# Patient Record
Sex: MALE | Race: ASIAN | NOT HISPANIC OR LATINO | Employment: UNEMPLOYED | URBAN - METROPOLITAN AREA
[De-identification: names, ages, dates, MRNs, and addresses within clinical notes are randomized per-mention and may not be internally consistent; named-entity substitution may affect disease eponyms.]

---

## 2017-07-06 ENCOUNTER — HOSPITAL ENCOUNTER (EMERGENCY)
Facility: HOSPITAL | Age: 57
Discharge: HOME | End: 2017-07-06
Attending: EMERGENCY MEDICINE
Payer: COMMERCIAL

## 2017-07-06 ENCOUNTER — APPOINTMENT (EMERGENCY)
Dept: CT IMAGING | Facility: HOSPITAL | Age: 57
End: 2017-07-06
Payer: COMMERCIAL

## 2017-07-06 VITALS
SYSTOLIC BLOOD PRESSURE: 151 MMHG | DIASTOLIC BLOOD PRESSURE: 75 MMHG | TEMPERATURE: 98.3 F | HEART RATE: 57 BPM | OXYGEN SATURATION: 98 % | RESPIRATION RATE: 16 BRPM | WEIGHT: 207 LBS

## 2017-07-06 DIAGNOSIS — M54.9 ACUTE BACK PAIN: Primary | ICD-10-CM

## 2017-07-06 DIAGNOSIS — M54.50 ACUTE LOW BACK PAIN: ICD-10-CM

## 2017-07-06 LAB
ALBUMIN SERPL BCP-MCNC: 3.6 G/DL (ref 3.5–5)
ALP SERPL-CCNC: 72 U/L (ref 46–116)
ALT SERPL W P-5'-P-CCNC: 35 U/L (ref 12–78)
ANION GAP SERPL CALCULATED.3IONS-SCNC: 8 MMOL/L (ref 4–13)
APTT PPP: 33 SECONDS (ref 23–35)
AST SERPL W P-5'-P-CCNC: 23 U/L (ref 5–45)
ATRIAL RATE: 74 BPM
BACTERIA UR QL AUTO: ABNORMAL /HPF
BASOPHILS # BLD AUTO: 0.03 THOUSANDS/ΜL (ref 0–0.1)
BASOPHILS NFR BLD AUTO: 0 % (ref 0–1)
BILIRUB SERPL-MCNC: 0.51 MG/DL (ref 0.2–1)
BILIRUB UR QL STRIP: NEGATIVE
BUN SERPL-MCNC: 9 MG/DL (ref 5–25)
CALCIUM SERPL-MCNC: 8.7 MG/DL (ref 8.3–10.1)
CHLORIDE SERPL-SCNC: 101 MMOL/L (ref 100–108)
CLARITY UR: CLEAR
CO2 SERPL-SCNC: 29 MMOL/L (ref 21–32)
COLOR UR: YELLOW
CREAT SERPL-MCNC: 0.89 MG/DL (ref 0.6–1.3)
EOSINOPHIL # BLD AUTO: 0.11 THOUSAND/ΜL (ref 0–0.61)
EOSINOPHIL NFR BLD AUTO: 1 % (ref 0–6)
ERYTHROCYTE [DISTWIDTH] IN BLOOD BY AUTOMATED COUNT: 12.5 % (ref 11.6–15.1)
GFR SERPL CREATININE-BSD FRML MDRD: >60 ML/MIN/1.73SQ M
GLUCOSE SERPL-MCNC: 164 MG/DL (ref 65–140)
GLUCOSE UR STRIP-MCNC: ABNORMAL MG/DL
HCT VFR BLD AUTO: 45 % (ref 36.5–49.3)
HGB BLD-MCNC: 15.2 G/DL (ref 12–17)
HGB UR QL STRIP.AUTO: ABNORMAL
INR PPP: 1.09 (ref 0.86–1.16)
KETONES UR STRIP-MCNC: NEGATIVE MG/DL
LEUKOCYTE ESTERASE UR QL STRIP: NEGATIVE
LIPASE SERPL-CCNC: 140 U/L (ref 73–393)
LYMPHOCYTES # BLD AUTO: 4.31 THOUSANDS/ΜL (ref 0.6–4.47)
LYMPHOCYTES NFR BLD AUTO: 42 % (ref 14–44)
MCH RBC QN AUTO: 29.1 PG (ref 26.8–34.3)
MCHC RBC AUTO-ENTMCNC: 33.8 G/DL (ref 31.4–37.4)
MCV RBC AUTO: 86 FL (ref 82–98)
MONOCYTES # BLD AUTO: 0.24 THOUSAND/ΜL (ref 0.17–1.22)
MONOCYTES NFR BLD AUTO: 2 % (ref 4–12)
NEUTROPHILS # BLD AUTO: 5.51 THOUSANDS/ΜL (ref 1.85–7.62)
NEUTS SEG NFR BLD AUTO: 55 % (ref 43–75)
NITRITE UR QL STRIP: NEGATIVE
NON-SQ EPI CELLS URNS QL MICRO: ABNORMAL /HPF
P AXIS: 65 DEGREES
PH UR STRIP.AUTO: 5.5 [PH] (ref 4.5–8)
PLATELET # BLD AUTO: 183 THOUSANDS/UL (ref 149–390)
PMV BLD AUTO: 10.6 FL (ref 8.9–12.7)
POTASSIUM SERPL-SCNC: 3.8 MMOL/L (ref 3.5–5.3)
PR INTERVAL: 144 MS
PROT SERPL-MCNC: 8.5 G/DL (ref 6.4–8.2)
PROT UR STRIP-MCNC: NEGATIVE MG/DL
PROTHROMBIN TIME: 14.1 SECONDS (ref 12.1–14.4)
QRS AXIS: 95 DEGREES
QRSD INTERVAL: 84 MS
QT INTERVAL: 384 MS
QTC INTERVAL: 426 MS
RBC # BLD AUTO: 5.22 MILLION/UL (ref 3.88–5.62)
RBC #/AREA URNS AUTO: ABNORMAL /HPF
SODIUM SERPL-SCNC: 138 MMOL/L (ref 136–145)
SP GR UR STRIP.AUTO: 1.02 (ref 1–1.03)
T WAVE AXIS: 1 DEGREES
UROBILINOGEN UR QL STRIP.AUTO: 0.2 E.U./DL
VENTRICULAR RATE: 74 BPM
WBC # BLD AUTO: 10.2 THOUSAND/UL (ref 4.31–10.16)
WBC #/AREA URNS AUTO: ABNORMAL /HPF

## 2017-07-06 PROCEDURE — 80053 COMPREHEN METABOLIC PANEL: CPT | Performed by: EMERGENCY MEDICINE

## 2017-07-06 PROCEDURE — 93005 ELECTROCARDIOGRAM TRACING: CPT | Performed by: EMERGENCY MEDICINE

## 2017-07-06 PROCEDURE — 85730 THROMBOPLASTIN TIME PARTIAL: CPT | Performed by: EMERGENCY MEDICINE

## 2017-07-06 PROCEDURE — 81002 URINALYSIS NONAUTO W/O SCOPE: CPT | Performed by: EMERGENCY MEDICINE

## 2017-07-06 PROCEDURE — 36415 COLL VENOUS BLD VENIPUNCTURE: CPT | Performed by: EMERGENCY MEDICINE

## 2017-07-06 PROCEDURE — 83690 ASSAY OF LIPASE: CPT | Performed by: EMERGENCY MEDICINE

## 2017-07-06 PROCEDURE — 96375 TX/PRO/DX INJ NEW DRUG ADDON: CPT

## 2017-07-06 PROCEDURE — 81001 URINALYSIS AUTO W/SCOPE: CPT

## 2017-07-06 PROCEDURE — 85025 COMPLETE CBC W/AUTO DIFF WBC: CPT | Performed by: EMERGENCY MEDICINE

## 2017-07-06 PROCEDURE — 85610 PROTHROMBIN TIME: CPT | Performed by: EMERGENCY MEDICINE

## 2017-07-06 PROCEDURE — 99284 EMERGENCY DEPT VISIT MOD MDM: CPT

## 2017-07-06 PROCEDURE — 74177 CT ABD & PELVIS W/CONTRAST: CPT

## 2017-07-06 PROCEDURE — 96374 THER/PROPH/DIAG INJ IV PUSH: CPT

## 2017-07-06 RX ORDER — LIDOCAINE 50 MG/G
1 PATCH TOPICAL EVERY 24 HOURS
Qty: 7 PATCH | Refills: 0 | Status: SHIPPED | OUTPATIENT
Start: 2017-07-06 | End: 2018-09-25 | Stop reason: ALTCHOICE

## 2017-07-06 RX ORDER — ONDANSETRON 2 MG/ML
4 INJECTION INTRAMUSCULAR; INTRAVENOUS ONCE
Status: COMPLETED | OUTPATIENT
Start: 2017-07-06 | End: 2017-07-06

## 2017-07-06 RX ORDER — MORPHINE SULFATE 4 MG/ML
4 INJECTION, SOLUTION INTRAMUSCULAR; INTRAVENOUS ONCE
Status: COMPLETED | OUTPATIENT
Start: 2017-07-06 | End: 2017-07-06

## 2017-07-06 RX ORDER — NAPROXEN 250 MG/1
250 TABLET ORAL
Qty: 21 TABLET | Refills: 0 | Status: SHIPPED | OUTPATIENT
Start: 2017-07-06 | End: 2018-09-25 | Stop reason: ALTCHOICE

## 2017-07-06 RX ADMIN — ONDANSETRON 4 MG: 2 INJECTION INTRAMUSCULAR; INTRAVENOUS at 14:52

## 2017-07-06 RX ADMIN — IOHEXOL 100 ML: 350 INJECTION, SOLUTION INTRAVENOUS at 16:42

## 2017-07-06 RX ADMIN — MORPHINE SULFATE 4 MG: 4 INJECTION, SOLUTION INTRAMUSCULAR; INTRAVENOUS at 14:52

## 2017-08-02 ENCOUNTER — ALLSCRIPTS OFFICE VISIT (OUTPATIENT)
Dept: OTHER | Facility: OTHER | Age: 57
End: 2017-08-02

## 2017-08-02 DIAGNOSIS — E11.65 TYPE 2 DIABETES MELLITUS WITH HYPERGLYCEMIA (HCC): ICD-10-CM

## 2017-08-02 LAB — HBA1C MFR BLD HPLC: 10.3 %

## 2017-08-03 ENCOUNTER — APPOINTMENT (OUTPATIENT)
Dept: LAB | Facility: CLINIC | Age: 57
End: 2017-08-03
Payer: COMMERCIAL

## 2017-08-03 ENCOUNTER — TRANSCRIBE ORDERS (OUTPATIENT)
Dept: LAB | Facility: CLINIC | Age: 57
End: 2017-08-03

## 2017-08-03 DIAGNOSIS — E11.65 TYPE 2 DIABETES MELLITUS WITH HYPERGLYCEMIA (HCC): ICD-10-CM

## 2017-08-03 LAB
25(OH)D3 SERPL-MCNC: 16.5 NG/ML (ref 30–100)
ALBUMIN SERPL BCP-MCNC: 3.3 G/DL (ref 3.5–5)
ALP SERPL-CCNC: 79 U/L (ref 46–116)
ALT SERPL W P-5'-P-CCNC: 27 U/L (ref 12–78)
ANION GAP SERPL CALCULATED.3IONS-SCNC: 11 MMOL/L (ref 4–13)
AST SERPL W P-5'-P-CCNC: 14 U/L (ref 5–45)
BASOPHILS # BLD AUTO: 0.05 THOUSANDS/ΜL (ref 0–0.1)
BASOPHILS NFR BLD AUTO: 1 % (ref 0–1)
BILIRUB SERPL-MCNC: 0.34 MG/DL (ref 0.2–1)
BUN SERPL-MCNC: 14 MG/DL (ref 5–25)
CALCIUM SERPL-MCNC: 8.7 MG/DL (ref 8.3–10.1)
CHLORIDE SERPL-SCNC: 99 MMOL/L (ref 100–108)
CHOLEST SERPL-MCNC: 179 MG/DL (ref 50–200)
CO2 SERPL-SCNC: 25 MMOL/L (ref 21–32)
CREAT SERPL-MCNC: 0.89 MG/DL (ref 0.6–1.3)
CREAT UR-MCNC: 137 MG/DL
EOSINOPHIL # BLD AUTO: 0.25 THOUSAND/ΜL (ref 0–0.61)
EOSINOPHIL NFR BLD AUTO: 3 % (ref 0–6)
ERYTHROCYTE [DISTWIDTH] IN BLOOD BY AUTOMATED COUNT: 12.3 % (ref 11.6–15.1)
GFR SERPL CREATININE-BSD FRML MDRD: 96 ML/MIN/1.73SQ M
GLUCOSE P FAST SERPL-MCNC: 218 MG/DL (ref 65–99)
HCT VFR BLD AUTO: 45.8 % (ref 36.5–49.3)
HDLC SERPL-MCNC: 29 MG/DL (ref 40–60)
HGB BLD-MCNC: 15.2 G/DL (ref 12–17)
LDLC SERPL CALC-MCNC: 86 MG/DL (ref 0–100)
LYMPHOCYTES # BLD AUTO: 4.85 THOUSANDS/ΜL (ref 0.6–4.47)
LYMPHOCYTES NFR BLD AUTO: 50 % (ref 14–44)
MCH RBC QN AUTO: 29.3 PG (ref 26.8–34.3)
MCHC RBC AUTO-ENTMCNC: 33.2 G/DL (ref 31.4–37.4)
MCV RBC AUTO: 88 FL (ref 82–98)
MICROALBUMIN UR-MCNC: 39.2 MG/L (ref 0–20)
MICROALBUMIN/CREAT 24H UR: 29 MG/G CREATININE (ref 0–30)
MONOCYTES # BLD AUTO: 0.36 THOUSAND/ΜL (ref 0.17–1.22)
MONOCYTES NFR BLD AUTO: 4 % (ref 4–12)
NEUTROPHILS # BLD AUTO: 3.95 THOUSANDS/ΜL (ref 1.85–7.62)
NEUTS SEG NFR BLD AUTO: 42 % (ref 43–75)
NRBC BLD AUTO-RTO: 0 /100 WBCS
PLATELET # BLD AUTO: 179 THOUSANDS/UL (ref 149–390)
PMV BLD AUTO: 11.3 FL (ref 8.9–12.7)
POTASSIUM SERPL-SCNC: 3.9 MMOL/L (ref 3.5–5.3)
PROT SERPL-MCNC: 8.2 G/DL (ref 6.4–8.2)
RBC # BLD AUTO: 5.18 MILLION/UL (ref 3.88–5.62)
SODIUM SERPL-SCNC: 135 MMOL/L (ref 136–145)
TRIGL SERPL-MCNC: 320 MG/DL
TSH SERPL DL<=0.05 MIU/L-ACNC: 0.93 UIU/ML (ref 0.36–3.74)
WBC # BLD AUTO: 9.48 THOUSAND/UL (ref 4.31–10.16)

## 2017-08-03 PROCEDURE — 36415 COLL VENOUS BLD VENIPUNCTURE: CPT

## 2017-08-03 PROCEDURE — 82043 UR ALBUMIN QUANTITATIVE: CPT

## 2017-08-03 PROCEDURE — 82570 ASSAY OF URINE CREATININE: CPT

## 2017-08-03 PROCEDURE — 84443 ASSAY THYROID STIM HORMONE: CPT

## 2017-08-03 PROCEDURE — 80053 COMPREHEN METABOLIC PANEL: CPT

## 2017-08-03 PROCEDURE — 85025 COMPLETE CBC W/AUTO DIFF WBC: CPT

## 2017-08-03 PROCEDURE — 80061 LIPID PANEL: CPT

## 2017-08-03 PROCEDURE — 82306 VITAMIN D 25 HYDROXY: CPT

## 2018-01-12 NOTE — MISCELLANEOUS
Message  Message Free Text Note Form: CALLED PT 5/27/16 @ 153PM STATED REGGIE HIS FRIEND "SAMEER" WILL CALL US BACK  SHE DID AND EXPLAINED TO ME THAT HE HAS NOT BEEN IN FOR A F/U DUE TO NOT HAVING INSURANCE, I ADVISED HER THAT WE WILL STILL SEE HIM IF HE CHOOSES TO COME IN AND THAT HE CAN ALSO SPEAK TO OUR FINANCIAL COUNSELOR TO GET INFORMATION ABOUT [facility]   PT HAS APPOINTMENT WITH DR Mendel Menjivar 6/6/16 @ 940AM       Signatures   Electronically signed by : DANA Perea ; May 27 2016  2:11PM EST

## 2018-01-13 VITALS
RESPIRATION RATE: 18 BRPM | TEMPERATURE: 96.6 F | DIASTOLIC BLOOD PRESSURE: 80 MMHG | WEIGHT: 203 LBS | BODY MASS INDEX: 32.62 KG/M2 | HEART RATE: 104 BPM | HEIGHT: 66 IN | SYSTOLIC BLOOD PRESSURE: 122 MMHG | OXYGEN SATURATION: 100 %

## 2018-02-01 DIAGNOSIS — E11.9 TYPE 2 DIABETES MELLITUS WITHOUT COMPLICATION, WITHOUT LONG-TERM CURRENT USE OF INSULIN (HCC): Primary | ICD-10-CM

## 2018-09-11 DIAGNOSIS — E11.9 TYPE 2 DIABETES MELLITUS WITHOUT COMPLICATION, WITHOUT LONG-TERM CURRENT USE OF INSULIN (HCC): ICD-10-CM

## 2018-09-25 ENCOUNTER — OFFICE VISIT (OUTPATIENT)
Dept: URGENT CARE | Age: 58
End: 2018-09-25
Payer: COMMERCIAL

## 2018-09-25 DIAGNOSIS — R21 RASH: Primary | ICD-10-CM

## 2018-09-25 PROCEDURE — 99213 OFFICE O/P EST LOW 20 MIN: CPT | Performed by: PHYSICIAN ASSISTANT

## 2018-09-25 RX ORDER — CLOTRIMAZOLE 1 %
CREAM (GRAM) TOPICAL 2 TIMES DAILY
Qty: 30 G | Refills: 0 | Status: SHIPPED | OUTPATIENT
Start: 2018-09-25 | End: 2020-09-02 | Stop reason: ALTCHOICE

## 2018-09-25 NOTE — PROGRESS NOTES
[address] [facility]        NAME: Gene Cid is a 62 y o  male  : 1960    MRN: [id number]  DATE: 2018  TIME: 12:17 PM    Assessment and Plan   Rash [R21]  1  Rash  hydrocortisone 2 5 % cream    clotrimazole (LOTRIMIN) 1 % cream         Patient Instructions     Use creams as directed  Benadryl as needed for additional itching relief  Applied Triglide to areas that are rubbing if you're going to be out and be active  Follow up with PCP in 3-5 days  Proceed to  ER if symptoms worsen  Chief Complaint     Chief Complaint   Patient presents with    Rash     Pt c/o rash on arms, legs, and face for 1 week  History of Present Illness       69-year-old male presents with rash to bilateral arms in the axillary area  Bilateral inguinal area and posterior lower legs for the past couple weeks  No fevers chills nausea vomiting diarrhea  Rash   This is a new problem  The current episode started 1 to 4 weeks ago  The problem has been waxing and waning since onset  The affected locations include the left axilla, right axilla, right arm, left arm, right lower leg and left lower leg (Left and right inguinal area)  The rash is characterized by itchiness, pain, redness, swelling and burning  He was exposed to nothing  Pertinent negatives include no cough, fever or shortness of breath  Past treatments include nothing  The treatment provided no relief  Review of Systems   Review of Systems   Constitutional: Negative  Negative for fever  HENT: Negative  Eyes: Negative  Respiratory: Negative  Negative for cough and shortness of breath  Cardiovascular: Negative  Gastrointestinal: Negative  Musculoskeletal: Negative  Skin: Positive for rash  Neurological: Negative            Current Medications       Current Outpatient Prescriptions:     metFORMIN (GLUCOPHAGE) 1000 MG tablet, TAKE ONE TABLET BY MOUTH TWICE A DAY, Disp: 180 tablet, Rfl: 1    clotrimazole (LOTRIMIN) 1 % cream, Apply topically 2 (two) times a day, Disp: 30 g, Rfl: 0    hydrocortisone 2 5 % cream, Apply topically 2 (two) times a day, Disp: 30 g, Rfl: 0    Current Allergies     Allergies as of 09/25/2018    (No Known Allergies)            The following portions of the patient's history were reviewed and updated as appropriate: allergies, current medications, past family history, past medical history, past social history, past surgical history and problem list      Past Medical History:   Diagnosis Date    Diabetes mellitus ([facility] [address] )        History reviewed  No pertinent surgical history  History reviewed  No pertinent family history  Medications have been verified  Objective   There were no vitals taken for this visit  Physical Exam     Physical Exam   Constitutional: He is oriented to person, place, and time  He appears well-developed and well-nourished  No distress  HENT:   Head: Normocephalic and atraumatic  Right Ear: External ear normal    Left Ear: External ear normal    Nose: Nose normal    Mouth/Throat: Oropharynx is clear and moist    Eyes: Conjunctivae are normal  Right eye exhibits no discharge  Left eye exhibits no discharge  Neck: Normal range of motion  Neck supple  Cardiovascular: Normal rate, regular rhythm and intact distal pulses  Pulmonary/Chest: Effort normal  No respiratory distress  Musculoskeletal: Normal range of motion  Neurological: He is alert and oriented to person, place, and time  Skin: Skin is warm and dry  Rash noted  Rash is maculopapular  Psychiatric: He has a normal mood and affect  Nursing note and vitals reviewed

## 2018-09-25 NOTE — PATIENT INSTRUCTIONS
Use creams as directed  Benadryl as needed for additional itching relief  Applied Triglide to areas that are rubbing if you're going to be out and be active  Follow up with PCP in 3-5 days  Proceed to  ER if symptoms worsen  Acute Rash   AMBULATORY CARE:   A rash  is irritation, redness, or itchiness in the skin or mucus membranes  Mucus membranes are areas such as the lining of your nose or throat  Acute means the rash starts suddenly, worsens quickly, and lasts a short time  An acute rash may be caused by a disease, such as hepatitis or vasculitis  The rash may be a reaction to something you are allergic to, such as certain foods, or latex  Certain medicines, including antibiotics, NSAIDs, prescription pain medicines, and aspirin can also cause a rash  Seek care immediately if:   · You have sudden trouble breathing or chest pain  · You are vomiting, have a headache or muscle aches, and your throat hurts  Contact your healthcare provider if:   · You have a fever  · You get open wounds from scratching your skin, or you have a wound that is red, swollen, or painful  · Your rash lasts longer than 3 months  · You have swelling or pain in your joints  · You have questions or concerns about your condition or care  Medicines:  If your rash does not go away on its own, you may need the following:  · Antihistamines  may be given to help decrease itching  · Steroids  may be given to decrease inflammation  · Antibiotics  help fight or prevent a bacterial infection  · Take your medicine as directed  Contact your healthcare provider if you think your medicine is not helping or if you have side effects  Tell him of her if you are allergic to any medicine  Keep a list of the medicines, vitamins, and herbs you take  Include the amounts, and when and why you take them  Bring the list or the pill bottles to follow-up visits  Carry your medicine list with you in case of an emergency    Prevent a rash or care for your skin when you have a rash:  Dry skin can lead to more problems  Do not scratch your skin if it itches  You may cause a skin infection by scratching  The following may prevent dry skin, and help your skin look better:  · Use thick cream lotions or petroleum jelly to help soothe your rash  These products work well on areas with thick skin, such as your feet  Cool compresses may also be used to soothe your skin  Apply a cool compress or a cool, wet towel, and then cover it with a dry towel  · Use lukewarm water when you bathe  Hot water may damage your skin more  Pat your skin dry  Do not rub your skin with a towel  · Use detergents, soaps, shampoos, and bubble baths made for sensitive skin  Wear clothes that are made of cotton instead of nylon or wool  Cotton is softer, so it will not hurt your skin as much  Follow up with your healthcare provider as directed: You may need to see a dermatologist if healthcare providers do not know what is causing your rash  You may also need to see a dermatologist if your rash does not get better even with treatment  You may need to see a dietitian if you have allergies to foods  Write down your questions so you remember to ask them during your visits  © 2017 [facility] Information is for End User's use only and may not be sold, redistributed or otherwise used for commercial purposes  All illustrations and images included in CareNotes® are the copyrighted property of A D A M [employer] Inc  or [employer]  The above information is an  only  It is not intended as medical advice for individual conditions or treatments  Talk to your doctor, nurse or pharmacist before following any medical regimen to see if it is safe and effective for you

## 2018-10-22 ENCOUNTER — TRANSCRIBE ORDERS (OUTPATIENT)
Dept: LAB | Facility: CLINIC | Age: 58
End: 2018-10-22

## 2018-10-22 ENCOUNTER — APPOINTMENT (OUTPATIENT)
Dept: LAB | Facility: CLINIC | Age: 58
End: 2018-10-22
Payer: COMMERCIAL

## 2018-10-22 ENCOUNTER — OFFICE VISIT (OUTPATIENT)
Dept: FAMILY MEDICINE CLINIC | Facility: CLINIC | Age: 58
End: 2018-10-22
Payer: COMMERCIAL

## 2018-10-22 ENCOUNTER — CLINICAL SUPPORT (OUTPATIENT)
Dept: FAMILY MEDICINE CLINIC | Facility: CLINIC | Age: 58
End: 2018-10-22
Payer: COMMERCIAL

## 2018-10-22 VITALS
BODY MASS INDEX: 33.29 KG/M2 | SYSTOLIC BLOOD PRESSURE: 114 MMHG | HEART RATE: 94 BPM | OXYGEN SATURATION: 99 % | HEIGHT: 67 IN | TEMPERATURE: 97.6 F | RESPIRATION RATE: 18 BRPM | DIASTOLIC BLOOD PRESSURE: 70 MMHG | WEIGHT: 212.13 LBS

## 2018-10-22 DIAGNOSIS — E11.9 TYPE 2 DIABETES MELLITUS WITHOUT COMPLICATION, WITHOUT LONG-TERM CURRENT USE OF INSULIN (HCC): Primary | ICD-10-CM

## 2018-10-22 DIAGNOSIS — Z01.00 DIABETIC EYE EXAM (HCC): Primary | ICD-10-CM

## 2018-10-22 DIAGNOSIS — Z23 NEED FOR INFLUENZA VACCINATION: ICD-10-CM

## 2018-10-22 DIAGNOSIS — E11.9 DIABETIC EYE EXAM (HCC): Primary | ICD-10-CM

## 2018-10-22 DIAGNOSIS — Z12.11 COLON CANCER SCREENING: ICD-10-CM

## 2018-10-22 DIAGNOSIS — L91.8 SKIN TAGS, MULTIPLE ACQUIRED: ICD-10-CM

## 2018-10-22 DIAGNOSIS — R22.9 MASS OF SKIN: ICD-10-CM

## 2018-10-22 LAB
ALBUMIN SERPL BCP-MCNC: 3.8 G/DL (ref 3.5–5)
ALP SERPL-CCNC: 61 U/L (ref 46–116)
ALT SERPL W P-5'-P-CCNC: 31 U/L (ref 12–78)
ANION GAP SERPL CALCULATED.3IONS-SCNC: 6 MMOL/L (ref 4–13)
AST SERPL W P-5'-P-CCNC: 19 U/L (ref 5–45)
BASOPHILS # BLD AUTO: 0.06 THOUSANDS/ΜL (ref 0–0.1)
BASOPHILS NFR BLD AUTO: 1 % (ref 0–1)
BILIRUB SERPL-MCNC: 0.6 MG/DL (ref 0.2–1)
BUN SERPL-MCNC: 13 MG/DL (ref 5–25)
CALCIUM SERPL-MCNC: 8.8 MG/DL (ref 8.3–10.1)
CHLORIDE SERPL-SCNC: 103 MMOL/L (ref 100–108)
CHOLEST SERPL-MCNC: 182 MG/DL (ref 50–200)
CO2 SERPL-SCNC: 27 MMOL/L (ref 21–32)
CREAT SERPL-MCNC: 0.99 MG/DL (ref 0.6–1.3)
CREAT UR-MCNC: 163 MG/DL
EOSINOPHIL # BLD AUTO: 0.19 THOUSAND/ΜL (ref 0–0.61)
EOSINOPHIL NFR BLD AUTO: 2 % (ref 0–6)
ERYTHROCYTE [DISTWIDTH] IN BLOOD BY AUTOMATED COUNT: 12.9 % (ref 11.6–15.1)
GFR SERPL CREATININE-BSD FRML MDRD: 84 ML/MIN/1.73SQ M
GLUCOSE P FAST SERPL-MCNC: 137 MG/DL (ref 65–99)
HCT VFR BLD AUTO: 45.4 % (ref 36.5–49.3)
HDLC SERPL-MCNC: 31 MG/DL (ref 40–60)
HGB BLD-MCNC: 14.7 G/DL (ref 12–17)
IMM GRANULOCYTES # BLD AUTO: 0.03 THOUSAND/UL (ref 0–0.2)
IMM GRANULOCYTES NFR BLD AUTO: 0 % (ref 0–2)
LDLC SERPL CALC-MCNC: 92 MG/DL (ref 0–100)
LEFT EYE DIABETIC RETINOPATHY: NORMAL
LEFT EYE IMAGE QUALITY: NORMAL
LYMPHOCYTES # BLD AUTO: 4.68 THOUSANDS/ΜL (ref 0.6–4.47)
LYMPHOCYTES NFR BLD AUTO: 50 % (ref 14–44)
MCH RBC QN AUTO: 29.6 PG (ref 26.8–34.3)
MCHC RBC AUTO-ENTMCNC: 32.4 G/DL (ref 31.4–37.4)
MCV RBC AUTO: 92 FL (ref 82–98)
MICROALBUMIN UR-MCNC: 68.8 MG/L (ref 0–20)
MICROALBUMIN/CREAT 24H UR: 42 MG/G CREATININE (ref 0–30)
MONOCYTES # BLD AUTO: 0.25 THOUSAND/ΜL (ref 0.17–1.22)
MONOCYTES NFR BLD AUTO: 3 % (ref 4–12)
NEUTROPHILS # BLD AUTO: 4.15 THOUSANDS/ΜL (ref 1.85–7.62)
NEUTS SEG NFR BLD AUTO: 44 % (ref 43–75)
NONHDLC SERPL-MCNC: 151 MG/DL
NRBC BLD AUTO-RTO: 0 /100 WBCS
PLATELET # BLD AUTO: 222 THOUSANDS/UL (ref 149–390)
PMV BLD AUTO: 11 FL (ref 8.9–12.7)
POTASSIUM SERPL-SCNC: 4.5 MMOL/L (ref 3.5–5.3)
PROT SERPL-MCNC: 8.8 G/DL (ref 6.4–8.2)
RBC # BLD AUTO: 4.96 MILLION/UL (ref 3.88–5.62)
RIGHT EYE DIABETIC RETINOPATHY: NORMAL
RIGHT EYE IMAGE QUALITY: NORMAL
SEVERITY (EYE EXAM): NORMAL
SL AMB POCT HEMOGLOBIN AIC: 7.8
SODIUM SERPL-SCNC: 136 MMOL/L (ref 136–145)
TRIGL SERPL-MCNC: 296 MG/DL
TSH SERPL DL<=0.05 MIU/L-ACNC: 1.08 UIU/ML (ref 0.36–3.74)
WBC # BLD AUTO: 9.36 THOUSAND/UL (ref 4.31–10.16)

## 2018-10-22 PROCEDURE — 90471 IMMUNIZATION ADMIN: CPT

## 2018-10-22 PROCEDURE — 84443 ASSAY THYROID STIM HORMONE: CPT | Performed by: FAMILY MEDICINE

## 2018-10-22 PROCEDURE — 83036 HEMOGLOBIN GLYCOSYLATED A1C: CPT | Performed by: FAMILY MEDICINE

## 2018-10-22 PROCEDURE — 1036F TOBACCO NON-USER: CPT | Performed by: FAMILY MEDICINE

## 2018-10-22 PROCEDURE — 3008F BODY MASS INDEX DOCD: CPT | Performed by: FAMILY MEDICINE

## 2018-10-22 PROCEDURE — 3045F PR MOST RECENT HEMOGLOBIN A1C LEVEL 7.0-9.0%: CPT | Performed by: FAMILY MEDICINE

## 2018-10-22 PROCEDURE — 82570 ASSAY OF URINE CREATININE: CPT | Performed by: FAMILY MEDICINE

## 2018-10-22 PROCEDURE — 90682 RIV4 VACC RECOMBINANT DNA IM: CPT

## 2018-10-22 PROCEDURE — 3072F LOW RISK FOR RETINOPATHY: CPT | Performed by: FAMILY MEDICINE

## 2018-10-22 PROCEDURE — 99213 OFFICE O/P EST LOW 20 MIN: CPT | Performed by: FAMILY MEDICINE

## 2018-10-22 PROCEDURE — 80061 LIPID PANEL: CPT | Performed by: FAMILY MEDICINE

## 2018-10-22 PROCEDURE — 80053 COMPREHEN METABOLIC PANEL: CPT | Performed by: FAMILY MEDICINE

## 2018-10-22 PROCEDURE — 36415 COLL VENOUS BLD VENIPUNCTURE: CPT | Performed by: FAMILY MEDICINE

## 2018-10-22 PROCEDURE — 85025 COMPLETE CBC W/AUTO DIFF WBC: CPT | Performed by: FAMILY MEDICINE

## 2018-10-22 PROCEDURE — 82043 UR ALBUMIN QUANTITATIVE: CPT | Performed by: FAMILY MEDICINE

## 2018-10-22 PROCEDURE — 3725F SCREEN DEPRESSION PERFORMED: CPT

## 2018-10-22 PROCEDURE — 3060F POS MICROALBUMINURIA REV: CPT | Performed by: FAMILY MEDICINE

## 2018-10-22 PROCEDURE — 92250 FUNDUS PHOTOGRAPHY W/I&R: CPT

## 2018-10-22 NOTE — ASSESSMENT & PLAN NOTE
Given numerous skin tags on neck, groin and underarms which are uncomfortable for him, referred to dermatology

## 2018-10-22 NOTE — ASSESSMENT & PLAN NOTE
Lab Results   Component Value Date    HGBA1C 7 8 10/22/2018       No results for input(s): POCGLU in the last 72 hours      Blood Sugar Average: Last 72 hrs:  BW improved compared to last visit  Continue Metformin  Dicussed low carbohydrate diet at length

## 2018-10-22 NOTE — PROGRESS NOTES
Diabetic Foot Exam    Patient's shoes and socks removed  Right Foot/Ankle   Right Foot Inspection  Skin Exam: skin normal and skin intact no dry skin, no warmth, no callus, no erythema, no maceration, no abnormal color, no pre-ulcer, no ulcer and no callus                          Toe Exam: ROM and strength within normal limits  Sensory   Vibration: intact  Proprioception: intact   Monofilament testing: intact  Vascular  Capillary refills: < 3 seconds  The right DP pulse is 1+  The right PT pulse is 1+  Left Foot/Ankle  Left Foot Inspection  Skin Exam: skin normal and skin intactno dry skin, no warmth, no erythema, no maceration, normal color, no pre-ulcer, no ulcer and no callus                         Toe Exam: ROM and strength within normal limits                   Sensory   Vibration: intact  Proprioception: intact  Monofilament: intact  Vascular  Capillary refills: < 3 seconds  The left DP pulse is 1+  The left PT pulse is 1+  Assign Risk Category:  No deformity present; No loss of protective sensation; No weak pulses       Risk: 0  Assessment/Plan:    Mass of skin  Given discoloration and increasing size of plaque on face would like him to see dermatology     Skin tags, multiple acquired  Given numerous skin tags on neck, groin and underarms which are uncomfortable for him, referred to dermatology     Type 2 diabetes mellitus without complication, without long-term current use of insulin ([facility] [address] )  Lab Results   Component Value Date    HGBA1C 7 8 10/22/2018       No results for input(s): POCGLU in the last 72 hours      Blood Sugar Average: Last 72 hrs:  BW improved compared to last visit  Continue Metformin  Dicussed low carbohydrate diet at length          Problem List Items Addressed This Visit        Endocrine    Type 2 diabetes mellitus without complication, without long-term current use of insulin ([address] ) - Primary     Lab Results   Component Value Date    HGBA1C 7 8 10/22/2018       No results for input(s): POCGLU in the last 72 hours  Blood Sugar Average: Last 72 hrs:  BW improved compared to last visit  Continue Metformin  Dicussed low carbohydrate diet at length          Relevant Medications    metFORMIN (GLUCOPHAGE) 1000 MG tablet    Other Relevant Orders    POCT hemoglobin A1c (Completed)    CBC and differential    Comprehensive metabolic panel    Lipid panel    Microalbumin / creatinine urine ratio    TSH, 3rd generation with Free T4 reflex       Musculoskeletal and Integument    Skin tags, multiple acquired     Given numerous skin tags on neck, groin and underarms which are uncomfortable for him, referred to dermatology          Relevant Orders    Ambulatory referral to Plastic Surgery       Other    Mass of skin     Given discoloration and increasing size of plaque on face would like him to see dermatology          Relevant Orders    Ambulatory referral to Plastic Surgery      Other Visit Diagnoses     Colon cancer screening        Relevant Orders    Ambulatory referral to Gastroenterology    Need for influenza vaccination        Relevant Orders    influenza vaccine, [id number], quadrivalent, recombinant, PF, 0 5 mL, for patients 18 yr+ (FLUBLOK) (Completed)            Subjective:      Patient ID: Brandon Davenport is a 62 y o  male  61 yo [facility] gentleman with DM, does not check his BG regularly, states has been watching his diet and going to the gym 3 to 4 days a week  Complains of plaque on his face which has been growing and getting darker  Also complains of increasing number of skin tags on his neck and groins  Has R elbow pain, worse with using his elbow         The following portions of the patient's history were reviewed and updated as appropriate:   He  has a past medical history of Diabetes mellitus ([address] )    He   Patient Active Problem List    Diagnosis Date Noted    Mass of skin 10/22/2018    Skin tags, multiple acquired 10/22/2018    Type 2 diabetes mellitus without complication, without long-term current use of insulin ([facility] [address] ) 10/22/2018     He  has no past surgical history on file  His family history is not on file  He  reports that he has quit smoking  He has never used smokeless tobacco  He reports that he does not drink alcohol or use drugs  Current Outpatient Prescriptions   Medication Sig Dispense Refill    clotrimazole (LOTRIMIN) 1 % cream Apply topically 2 (two) times a day 30 g 0    hydrocortisone 2 5 % cream Apply topically 2 (two) times a day 30 g 0    metFORMIN (GLUCOPHAGE) 1000 MG tablet Take 1 tablet (1,000 mg total) by mouth 2 (two) times a day 180 tablet 1     No current facility-administered medications for this visit  Current Outpatient Prescriptions on File Prior to Visit   Medication Sig    clotrimazole (LOTRIMIN) 1 % cream Apply topically 2 (two) times a day    hydrocortisone 2 5 % cream Apply topically 2 (two) times a day    [DISCONTINUED] metFORMIN (GLUCOPHAGE) 1000 MG tablet TAKE ONE TABLET BY MOUTH TWICE A DAY     No current facility-administered medications on file prior to visit       Review of Systems   Musculoskeletal: Positive for arthralgias  R elbow pain    Skin:        As per HPI   All other systems reviewed and are negative  Objective:      /70 (BP Location: Right arm, Patient Position: Sitting, Cuff Size: Large)   Pulse 94   Temp 97 6 °F (36 4 °C) (Tympanic)   Resp 18   Ht 5' 7 2" (1 707 m)   Wt 96 2 kg (212 lb 2 oz)   SpO2 99%   BMI 33 03 kg/m²          Physical Exam   Constitutional: He is oriented to person, place, and time  He appears well-developed  HENT:   Head: Normocephalic  Right Ear: External ear normal    Left Ear: External ear normal    Nose: Nose normal    Mouth/Throat: Oropharynx is clear and moist    Eyes: Pupils are equal, round, and reactive to light  Conjunctivae and EOM are normal    Neck: Normal range of motion  Neck supple  No thyromegaly present     Cardiovascular: Normal rate, regular rhythm and normal heart sounds  Pulses are no weak pulses  Pulses:       Dorsalis pedis pulses are 1+ on the right side, and 1+ on the left side  Posterior tibial pulses are 1+ on the right side, and 1+ on the left side  Pulmonary/Chest: Effort normal and breath sounds normal    Abdominal: Soft  There is no tenderness  There is no rebound and no guarding  Musculoskeletal: Normal range of motion  Feet:   Right Foot:   Skin Integrity: Negative for ulcer, skin breakdown, erythema, warmth, callus or dry skin  Left Foot:   Skin Integrity: Negative for ulcer, skin breakdown, erythema, warmth, callus or dry skin  Neurological: He is alert and oriented to person, place, and time  He has normal reflexes  Skin: Skin is dry  1 cm irregular plaque dark with hypochromic areas, irregular borders   Multiple skin tags of varying sizes on neck and groins  Over 20 in number    Psychiatric: He has a normal mood and affect  Nursing note and vitals reviewed

## 2018-10-23 DIAGNOSIS — E11.319 DIABETIC RETINOPATHY OF BOTH EYES WITHOUT MACULAR EDEMA ASSOCIATED WITH TYPE 2 DIABETES MELLITUS, UNSPECIFIED RETINOPATHY SEVERITY (HCC): Primary | ICD-10-CM

## 2018-10-23 NOTE — PROGRESS NOTES
Pls let pt know his eye exam shows that the diabetes is affecting his eyes and needs to see an eye doctor within the next 6 months

## 2018-11-12 DIAGNOSIS — E11.9 TYPE 2 DIABETES MELLITUS WITHOUT COMPLICATION, WITHOUT LONG-TERM CURRENT USE OF INSULIN (HCC): Primary | ICD-10-CM

## 2018-11-12 PROCEDURE — 4010F ACE/ARB THERAPY RXD/TAKEN: CPT | Performed by: FAMILY MEDICINE

## 2018-11-12 RX ORDER — LISINOPRIL 5 MG/1
TABLET ORAL
Qty: 90 TABLET | Refills: 5 | Status: SHIPPED | OUTPATIENT
Start: 2018-11-12 | End: 2020-09-02 | Stop reason: SDUPTHER

## 2019-08-15 DIAGNOSIS — E11.9 TYPE 2 DIABETES MELLITUS WITHOUT COMPLICATION, WITHOUT LONG-TERM CURRENT USE OF INSULIN (HCC): ICD-10-CM

## 2019-10-08 ENCOUNTER — OFFICE VISIT (OUTPATIENT)
Dept: FAMILY MEDICINE CLINIC | Facility: CLINIC | Age: 59
End: 2019-10-08

## 2019-10-08 ENCOUNTER — APPOINTMENT (OUTPATIENT)
Dept: LAB | Facility: CLINIC | Age: 59
End: 2019-10-08
Payer: COMMERCIAL

## 2019-10-08 VITALS
BODY MASS INDEX: 32.07 KG/M2 | WEIGHT: 206 LBS | OXYGEN SATURATION: 98 % | SYSTOLIC BLOOD PRESSURE: 110 MMHG | TEMPERATURE: 97.7 F | RESPIRATION RATE: 18 BRPM | DIASTOLIC BLOOD PRESSURE: 76 MMHG | HEART RATE: 101 BPM

## 2019-10-08 DIAGNOSIS — E11.9 TYPE 2 DIABETES MELLITUS WITHOUT COMPLICATION, WITHOUT LONG-TERM CURRENT USE OF INSULIN (HCC): Primary | ICD-10-CM

## 2019-10-08 DIAGNOSIS — E11.9 TYPE 2 DIABETES MELLITUS WITHOUT COMPLICATION, WITHOUT LONG-TERM CURRENT USE OF INSULIN (HCC): ICD-10-CM

## 2019-10-08 DIAGNOSIS — L03.211 CELLULITIS OF FACE: ICD-10-CM

## 2019-10-08 DIAGNOSIS — Z23 NEED FOR VACCINATION: ICD-10-CM

## 2019-10-08 DIAGNOSIS — M54.50 ACUTE LEFT-SIDED LOW BACK PAIN WITHOUT SCIATICA: ICD-10-CM

## 2019-10-08 LAB
ALBUMIN SERPL BCP-MCNC: 4.3 G/DL (ref 3.5–5)
ALP SERPL-CCNC: 66 U/L (ref 46–116)
ALT SERPL W P-5'-P-CCNC: 46 U/L (ref 12–78)
ANION GAP SERPL CALCULATED.3IONS-SCNC: 9 MMOL/L (ref 4–13)
AST SERPL W P-5'-P-CCNC: 23 U/L (ref 5–45)
BASOPHILS # BLD AUTO: 0.08 THOUSANDS/ΜL (ref 0–0.1)
BASOPHILS NFR BLD AUTO: 1 % (ref 0–1)
BILIRUB SERPL-MCNC: 0.67 MG/DL (ref 0.2–1)
BUN SERPL-MCNC: 18 MG/DL (ref 5–25)
CALCIUM SERPL-MCNC: 9.2 MG/DL (ref 8.3–10.1)
CHLORIDE SERPL-SCNC: 104 MMOL/L (ref 100–108)
CHOLEST SERPL-MCNC: 178 MG/DL (ref 50–200)
CO2 SERPL-SCNC: 24 MMOL/L (ref 21–32)
CREAT SERPL-MCNC: 1.03 MG/DL (ref 0.6–1.3)
CREAT UR-MCNC: 169 MG/DL
EOSINOPHIL # BLD AUTO: 0.21 THOUSAND/ΜL (ref 0–0.61)
EOSINOPHIL NFR BLD AUTO: 2 % (ref 0–6)
ERYTHROCYTE [DISTWIDTH] IN BLOOD BY AUTOMATED COUNT: 12.4 % (ref 11.6–15.1)
GFR SERPL CREATININE-BSD FRML MDRD: 79 ML/MIN/1.73SQ M
GLUCOSE P FAST SERPL-MCNC: 220 MG/DL (ref 65–99)
HCT VFR BLD AUTO: 48.3 % (ref 36.5–49.3)
HDLC SERPL-MCNC: 31 MG/DL (ref 40–60)
HGB BLD-MCNC: 15.6 G/DL (ref 12–17)
IMM GRANULOCYTES # BLD AUTO: 0.03 THOUSAND/UL (ref 0–0.2)
IMM GRANULOCYTES NFR BLD AUTO: 0 % (ref 0–2)
LDLC SERPL CALC-MCNC: 92 MG/DL (ref 0–100)
LYMPHOCYTES # BLD AUTO: 4.71 THOUSANDS/ΜL (ref 0.6–4.47)
LYMPHOCYTES NFR BLD AUTO: 44 % (ref 14–44)
MCH RBC QN AUTO: 28.8 PG (ref 26.8–34.3)
MCHC RBC AUTO-ENTMCNC: 32.3 G/DL (ref 31.4–37.4)
MCV RBC AUTO: 89 FL (ref 82–98)
MICROALBUMIN UR-MCNC: 196 MG/L (ref 0–20)
MICROALBUMIN/CREAT 24H UR: 116 MG/G CREATININE (ref 0–30)
MONOCYTES # BLD AUTO: 0.45 THOUSAND/ΜL (ref 0.17–1.22)
MONOCYTES NFR BLD AUTO: 4 % (ref 4–12)
NEUTROPHILS # BLD AUTO: 5.23 THOUSANDS/ΜL (ref 1.85–7.62)
NEUTS SEG NFR BLD AUTO: 49 % (ref 43–75)
NONHDLC SERPL-MCNC: 147 MG/DL
NRBC BLD AUTO-RTO: 0 /100 WBCS
PLATELET # BLD AUTO: 168 THOUSANDS/UL (ref 149–390)
PMV BLD AUTO: 11.6 FL (ref 8.9–12.7)
POTASSIUM SERPL-SCNC: 4.1 MMOL/L (ref 3.5–5.3)
PROT SERPL-MCNC: 9.3 G/DL (ref 6.4–8.2)
RBC # BLD AUTO: 5.41 MILLION/UL (ref 3.88–5.62)
SL AMB POCT HEMOGLOBIN AIC: 10.6 (ref ?–6.5)
SODIUM SERPL-SCNC: 137 MMOL/L (ref 136–145)
T4 FREE SERPL-MCNC: 1.25 NG/DL (ref 0.76–1.46)
TRIGL SERPL-MCNC: 273 MG/DL
TSH SERPL DL<=0.05 MIU/L-ACNC: 0.02 UIU/ML (ref 0.36–3.74)
WBC # BLD AUTO: 10.71 THOUSAND/UL (ref 4.31–10.16)

## 2019-10-08 PROCEDURE — 36415 COLL VENOUS BLD VENIPUNCTURE: CPT

## 2019-10-08 PROCEDURE — 82570 ASSAY OF URINE CREATININE: CPT

## 2019-10-08 PROCEDURE — 84439 ASSAY OF FREE THYROXINE: CPT

## 2019-10-08 PROCEDURE — 90682 RIV4 VACC RECOMBINANT DNA IM: CPT | Performed by: FAMILY MEDICINE

## 2019-10-08 PROCEDURE — 80061 LIPID PANEL: CPT

## 2019-10-08 PROCEDURE — 99214 OFFICE O/P EST MOD 30 MIN: CPT | Performed by: FAMILY MEDICINE

## 2019-10-08 PROCEDURE — 83036 HEMOGLOBIN GLYCOSYLATED A1C: CPT | Performed by: FAMILY MEDICINE

## 2019-10-08 PROCEDURE — 90471 IMMUNIZATION ADMIN: CPT

## 2019-10-08 PROCEDURE — 85025 COMPLETE CBC W/AUTO DIFF WBC: CPT

## 2019-10-08 PROCEDURE — 90471 IMMUNIZATION ADMIN: CPT | Performed by: FAMILY MEDICINE

## 2019-10-08 PROCEDURE — 90715 TDAP VACCINE 7 YRS/> IM: CPT

## 2019-10-08 PROCEDURE — 80053 COMPREHEN METABOLIC PANEL: CPT

## 2019-10-08 PROCEDURE — 82043 UR ALBUMIN QUANTITATIVE: CPT

## 2019-10-08 PROCEDURE — 84443 ASSAY THYROID STIM HORMONE: CPT

## 2019-10-08 RX ORDER — CEPHALEXIN 500 MG/1
500 CAPSULE ORAL EVERY 8 HOURS SCHEDULED
Qty: 30 CAPSULE | Refills: 0 | Status: SHIPPED | OUTPATIENT
Start: 2019-10-08 | End: 2019-10-18

## 2019-10-08 NOTE — ASSESSMENT & PLAN NOTE
Lab Results   Component Value Date    HGBA1C 10 6 (A) 10/08/2019   Increased compared to 1 year ago  Restart Metformin 1000 mg BID and added Januvia  Counseled on importance of low carb diet  Counseled patient on decreasing carbohydrates in diet   Counseled on what are carbohydrates, and the difference between simple and complex carbohydrates  Referred to Endo

## 2019-10-08 NOTE — ASSESSMENT & PLAN NOTE
Keep area clean and dry  Cephalexin 500 mg q8h for 10 days  If no improvement return for follow up  Avoid itching the area

## 2019-10-08 NOTE — PROGRESS NOTES
Assessment/Plan:    Cellulitis of face  Keep area clean and dry  Cephalexin 500 mg q8h for 10 days  If no improvement return for follow up  Avoid itching the area      Type 2 diabetes mellitus without complication, without long-term current use of insulin ([facility])    Lab Results   Component Value Date    HGBA1C 10 6 (A) 10/08/2019   Increased compared to 1 year ago  Restart Metformin 1000 mg BID and added Januvia  Counseled on importance of low carb diet  Counseled patient on decreasing carbohydrates in diet  Counseled on what are carbohydrates, and the difference between simple and complex carbohydrates  Referred to Endo     Back pain:  Moist heat BID  Stretching exercises discussed  Would prefer to avoid NSAIDs till BW completed  Continue Lidoderm ointment and may take OTC Acetaminophen     Flu vaccine given today      Problem List Items Addressed This Visit        Endocrine    Type 2 diabetes mellitus without complication, without long-term current use of insulin [facility] ) - Primary       Lab Results   Component Value Date    HGBA1C 10 6 (A) 10/08/2019   Increased compared to 1 year ago  Restart Metformin 1000 mg BID and added Januvia  Counseled on importance of low carb diet  Counseled patient on decreasing carbohydrates in diet   Counseled on what are carbohydrates, and the difference between simple and complex carbohydrates  Referred to Endo          Relevant Medications    metFORMIN (GLUCOPHAGE) 1000 MG tablet    sitaGLIPtin (JANUVIA) 50 mg tablet    cephalexin (KEFLEX) 500 mg capsule    Other Relevant Orders    POCT hemoglobin A1c (Completed)    CBC and differential    Comprehensive metabolic panel    Lipid panel    Microalbumin / creatinine urine ratio    TSH, 3rd generation with Free T4 reflex    Ambulatory referral to Endocrinology       Other    Cellulitis of face     Keep area clean and dry  Cephalexin 500 mg q8h for 10 days  If no improvement return for follow up  Avoid itching the area             Other Visit Diagnoses     Need for vaccination        Relevant Orders    influenza vaccine, [id number], quadrivalent, recombinant, PF, 0 5 mL, for patients 18 yr+ (FLUBLOK) (Completed)    TDAP VACCINE GREATER THAN OR EQUAL TO 6YO IM (Completed)    Acute left-sided low back pain without sciatica                Subjective:      Patient ID: Sandra Brunner is a 61 y o  male  62 yo male from [facility], refuses to use  line, here today for follow up of DM  As per patient he ran out of Metformin about 1 month ago and admits he is not watching his diet and has been eating plenty of rice  Patient reports an insect while doing yard work  He complains of increasing redness and pain on his forehead, has been putting Triple Antibiotic Ointment without improvement  Also complains of LBP  Back Pain   This is a new problem  The current episode started 1 to 4 weeks ago  The problem occurs constantly  The problem has been gradually improving since onset  The pain is present in the lumbar spine  The quality of the pain is described as stabbing  The pain does not radiate  The pain is at a severity of 6/10  The pain is worse during the night  The symptoms are aggravated by lying down, position and twisting  He has tried analgesics, bed rest and heat for the symptoms  The treatment provided moderate relief  The following portions of the patient's history were reviewed and updated as appropriate: He  has a past medical history of Diabetes mellitus ([facility] [address] )  He   Patient Active Problem List    Diagnosis Date Noted    Cellulitis of face 10/08/2019    Mass of skin 10/22/2018    Skin tags, multiple acquired 10/22/2018    Type 2 diabetes mellitus without complication, without long-term current use of insulin ([address] ) 10/22/2018     He  has no past surgical history on file  His family history is not on file  He  reports that he has quit smoking   He has never used smokeless tobacco  He reports that he does not drink alcohol or use drugs   Current Outpatient Medications   Medication Sig Dispense Refill    clotrimazole (LOTRIMIN) 1 % cream Apply topically 2 (two) times a day 30 g 0    hydrocortisone 2 5 % cream Apply topically 2 (two) times a day 30 g 0    metFORMIN (GLUCOPHAGE) 1000 MG tablet Take 1 tablet (1,000 mg total) by mouth 2 (two) times a day 180 tablet 1    cephalexin (KEFLEX) 500 mg capsule Take 1 capsule (500 mg total) by mouth every 8 (eight) hours for 10 days 30 capsule 0    lisinopril (ZESTRIL) 5 mg tablet TAKE ONE TABLET BY MOUTH EVERY DAY 90 tablet 5    sitaGLIPtin (JANUVIA) 50 mg tablet Take 1 tablet (50 mg total) by mouth daily 90 tablet 3     No current facility-administered medications for this visit  Current Outpatient Medications on File Prior to Visit   Medication Sig    clotrimazole (LOTRIMIN) 1 % cream Apply topically 2 (two) times a day    hydrocortisone 2 5 % cream Apply topically 2 (two) times a day    [DISCONTINUED] metFORMIN (GLUCOPHAGE) 1000 MG tablet TAKE ONE TABLET BY MOUTH TWICE A DAY    lisinopril (ZESTRIL) 5 mg tablet TAKE ONE TABLET BY MOUTH EVERY DAY     No current facility-administered medications on file prior to visit       Review of Systems   Musculoskeletal: Positive for back pain  Skin:        As per HPI   All other systems reviewed and are negative  Objective:      /76   Pulse 101   Temp 97 7 °F (36 5 °C) (Temporal)   Resp 18   Wt 93 4 kg (206 lb)   SpO2 98%   BMI 32 07 kg/m²          Physical Exam   Constitutional: He is oriented to person, place, and time  He appears well-developed  HENT:   Head: Normocephalic  Right Ear: External ear normal    Left Ear: External ear normal    Nose: Nose normal    Mouth/Throat: Oropharynx is clear and moist    Eyes: Pupils are equal, round, and reactive to light  Conjunctivae and EOM are normal    Neck: Normal range of motion  Neck supple  No thyromegaly present     Cardiovascular: Normal rate, regular rhythm and normal heart sounds  Pulmonary/Chest: Effort normal and breath sounds normal    Abdominal: Soft  There is no tenderness  There is no rebound and no guarding  Musculoskeletal: Normal range of motion  Neurological: He is alert and oriented to person, place, and time  He has normal reflexes  Skin: Skin is dry  Rash noted  Rash is macular  There is erythema  3 cm area of induration with surrounding erythema on forehead    Psychiatric: He has a normal mood and affect  Nursing note and vitals reviewed

## 2019-10-10 DIAGNOSIS — E78.2 MIXED HYPERLIPIDEMIA: Primary | ICD-10-CM

## 2019-10-10 RX ORDER — ATORVASTATIN CALCIUM 40 MG/1
40 TABLET, FILM COATED ORAL DAILY
Qty: 90 TABLET | Refills: 3 | Status: SHIPPED | OUTPATIENT
Start: 2019-10-10 | End: 2020-01-27 | Stop reason: SDUPTHER

## 2020-01-27 ENCOUNTER — OFFICE VISIT (OUTPATIENT)
Dept: FAMILY MEDICINE CLINIC | Facility: CLINIC | Age: 60
End: 2020-01-27

## 2020-01-27 ENCOUNTER — TELEPHONE (OUTPATIENT)
Dept: FAMILY MEDICINE CLINIC | Facility: CLINIC | Age: 60
End: 2020-01-27

## 2020-01-27 ENCOUNTER — APPOINTMENT (OUTPATIENT)
Dept: LAB | Facility: CLINIC | Age: 60
End: 2020-01-27
Payer: COMMERCIAL

## 2020-01-27 VITALS
WEIGHT: 201 LBS | OXYGEN SATURATION: 97 % | TEMPERATURE: 97.2 F | BODY MASS INDEX: 31.29 KG/M2 | HEART RATE: 91 BPM | RESPIRATION RATE: 18 BRPM | DIASTOLIC BLOOD PRESSURE: 60 MMHG | SYSTOLIC BLOOD PRESSURE: 100 MMHG

## 2020-01-27 DIAGNOSIS — R07.82 INTERCOSTAL PAIN: ICD-10-CM

## 2020-01-27 DIAGNOSIS — E78.2 MIXED HYPERLIPIDEMIA: ICD-10-CM

## 2020-01-27 DIAGNOSIS — Z11.4 ENCOUNTER FOR SCREENING FOR HIV: ICD-10-CM

## 2020-01-27 DIAGNOSIS — Z11.59 ENCOUNTER FOR HEPATITIS C SCREENING TEST FOR LOW RISK PATIENT: ICD-10-CM

## 2020-01-27 DIAGNOSIS — E11.9 TYPE 2 DIABETES MELLITUS WITHOUT COMPLICATION, WITHOUT LONG-TERM CURRENT USE OF INSULIN (HCC): Primary | ICD-10-CM

## 2020-01-27 DIAGNOSIS — Z01.00 DIABETIC EYE EXAM (HCC): ICD-10-CM

## 2020-01-27 DIAGNOSIS — E11.9 TYPE 2 DIABETES MELLITUS WITHOUT COMPLICATION, WITHOUT LONG-TERM CURRENT USE OF INSULIN (HCC): ICD-10-CM

## 2020-01-27 DIAGNOSIS — E11.9 DIABETIC EYE EXAM (HCC): ICD-10-CM

## 2020-01-27 DIAGNOSIS — Z12.11 COLON CANCER SCREENING: ICD-10-CM

## 2020-01-27 DIAGNOSIS — M54.50 ACUTE BILATERAL LOW BACK PAIN WITHOUT SCIATICA: ICD-10-CM

## 2020-01-27 LAB
ALBUMIN SERPL BCP-MCNC: 4.3 G/DL (ref 3.5–5)
ALP SERPL-CCNC: 61 U/L (ref 46–116)
ALT SERPL W P-5'-P-CCNC: 29 U/L (ref 12–78)
ANION GAP SERPL CALCULATED.3IONS-SCNC: 3 MMOL/L (ref 4–13)
AST SERPL W P-5'-P-CCNC: 18 U/L (ref 5–45)
BASOPHILS # BLD AUTO: 0.06 THOUSANDS/ΜL (ref 0–0.1)
BASOPHILS NFR BLD AUTO: 1 % (ref 0–1)
BILIRUB SERPL-MCNC: 0.63 MG/DL (ref 0.2–1)
BUN SERPL-MCNC: 13 MG/DL (ref 5–25)
CALCIUM SERPL-MCNC: 9.2 MG/DL (ref 8.3–10.1)
CHLORIDE SERPL-SCNC: 109 MMOL/L (ref 100–108)
CHOLEST SERPL-MCNC: 226 MG/DL (ref 50–200)
CO2 SERPL-SCNC: 28 MMOL/L (ref 21–32)
CREAT SERPL-MCNC: 0.99 MG/DL (ref 0.6–1.3)
CREAT UR-MCNC: 113 MG/DL
EOSINOPHIL # BLD AUTO: 0.18 THOUSAND/ΜL (ref 0–0.61)
EOSINOPHIL NFR BLD AUTO: 2 % (ref 0–6)
ERYTHROCYTE [DISTWIDTH] IN BLOOD BY AUTOMATED COUNT: 13.6 % (ref 11.6–15.1)
GFR SERPL CREATININE-BSD FRML MDRD: 83 ML/MIN/1.73SQ M
GLUCOSE P FAST SERPL-MCNC: 100 MG/DL (ref 65–99)
HCT VFR BLD AUTO: 51.6 % (ref 36.5–49.3)
HCV AB SER QL: NORMAL
HDLC SERPL-MCNC: 39 MG/DL
HGB BLD-MCNC: 16.4 G/DL (ref 12–17)
IMM GRANULOCYTES # BLD AUTO: 0.03 THOUSAND/UL (ref 0–0.2)
IMM GRANULOCYTES NFR BLD AUTO: 0 % (ref 0–2)
LDLC SERPL CALC-MCNC: 148 MG/DL (ref 0–100)
LYMPHOCYTES # BLD AUTO: 4.75 THOUSANDS/ΜL (ref 0.6–4.47)
LYMPHOCYTES NFR BLD AUTO: 47 % (ref 14–44)
MCH RBC QN AUTO: 28.6 PG (ref 26.8–34.3)
MCHC RBC AUTO-ENTMCNC: 31.8 G/DL (ref 31.4–37.4)
MCV RBC AUTO: 90 FL (ref 82–98)
MICROALBUMIN UR-MCNC: 46.5 MG/L (ref 0–20)
MICROALBUMIN/CREAT 24H UR: 41 MG/G CREATININE (ref 0–30)
MONOCYTES # BLD AUTO: 0.32 THOUSAND/ΜL (ref 0.17–1.22)
MONOCYTES NFR BLD AUTO: 3 % (ref 4–12)
NEUTROPHILS # BLD AUTO: 4.69 THOUSANDS/ΜL (ref 1.85–7.62)
NEUTS SEG NFR BLD AUTO: 47 % (ref 43–75)
NONHDLC SERPL-MCNC: 187 MG/DL
NRBC BLD AUTO-RTO: 0 /100 WBCS
PLATELET # BLD AUTO: 198 THOUSANDS/UL (ref 149–390)
PMV BLD AUTO: 11.3 FL (ref 8.9–12.7)
POTASSIUM SERPL-SCNC: 4.3 MMOL/L (ref 3.5–5.3)
PROT SERPL-MCNC: 9.6 G/DL (ref 6.4–8.2)
RBC # BLD AUTO: 5.73 MILLION/UL (ref 3.88–5.62)
SL AMB POCT HEMOGLOBIN AIC: 6.7 (ref ?–6.5)
SODIUM SERPL-SCNC: 140 MMOL/L (ref 136–145)
TRIGL SERPL-MCNC: 195 MG/DL
TSH SERPL DL<=0.05 MIU/L-ACNC: 0.68 UIU/ML (ref 0.36–3.74)
WBC # BLD AUTO: 10.03 THOUSAND/UL (ref 4.31–10.16)

## 2020-01-27 PROCEDURE — 80061 LIPID PANEL: CPT

## 2020-01-27 PROCEDURE — 99214 OFFICE O/P EST MOD 30 MIN: CPT | Performed by: FAMILY MEDICINE

## 2020-01-27 PROCEDURE — 86803 HEPATITIS C AB TEST: CPT

## 2020-01-27 PROCEDURE — 84443 ASSAY THYROID STIM HORMONE: CPT

## 2020-01-27 PROCEDURE — 3044F HG A1C LEVEL LT 7.0%: CPT | Performed by: FAMILY MEDICINE

## 2020-01-27 PROCEDURE — 3060F POS MICROALBUMINURIA REV: CPT | Performed by: FAMILY MEDICINE

## 2020-01-27 PROCEDURE — 82570 ASSAY OF URINE CREATININE: CPT

## 2020-01-27 PROCEDURE — 83036 HEMOGLOBIN GLYCOSYLATED A1C: CPT | Performed by: FAMILY MEDICINE

## 2020-01-27 PROCEDURE — 87389 HIV-1 AG W/HIV-1&-2 AB AG IA: CPT

## 2020-01-27 PROCEDURE — 85025 COMPLETE CBC W/AUTO DIFF WBC: CPT

## 2020-01-27 PROCEDURE — 82043 UR ALBUMIN QUANTITATIVE: CPT

## 2020-01-27 PROCEDURE — 36415 COLL VENOUS BLD VENIPUNCTURE: CPT

## 2020-01-27 PROCEDURE — 80053 COMPREHEN METABOLIC PANEL: CPT

## 2020-01-27 RX ORDER — MELOXICAM 7.5 MG/1
7.5 TABLET ORAL DAILY
Qty: 90 TABLET | Refills: 0 | Status: SHIPPED | OUTPATIENT
Start: 2020-01-27 | End: 2020-09-02 | Stop reason: ALTCHOICE

## 2020-01-27 RX ORDER — ATORVASTATIN CALCIUM 40 MG/1
40 TABLET, FILM COATED ORAL DAILY
Qty: 90 TABLET | Refills: 3 | Status: SHIPPED | OUTPATIENT
Start: 2020-01-27 | End: 2021-01-27

## 2020-01-27 RX ORDER — CYCLOBENZAPRINE HCL 5 MG
5 TABLET ORAL 3 TIMES DAILY PRN
Qty: 30 TABLET | Refills: 0 | Status: SHIPPED | OUTPATIENT
Start: 2020-01-27 | End: 2020-09-02 | Stop reason: ALTCHOICE

## 2020-01-27 NOTE — ASSESSMENT & PLAN NOTE
Counseled patient that it most probably just musculoskeletal  Meloxicam 7 5 mg daily PRN will help  EKG done today with NSR, no ST-T changes

## 2020-01-27 NOTE — TELEPHONE ENCOUNTER
Per [facility] we fax cover sheet, note and order and they will call pt to schedule apt    I faxed 8 pgs today

## 2020-01-27 NOTE — ASSESSMENT & PLAN NOTE
Lab Results   Component Value Date    HGBA1C 6 7 (A) 01/27/2020   Greatly improved from 3 months ago  Continue Metformin 1000 mg BID and Januvia 100 daily as well as low carb diet  Foot exam done today  Referred for diabetic eye exam

## 2020-01-27 NOTE — PROGRESS NOTES
Assessment/Plan:    Type 2 diabetes mellitus without complication, without long-term current use of insulin (Formerly [facility])    Lab Results   Component Value Date    HGBA1C 6 7 (A) 01/27/2020   Greatly improved from 3 months ago  Continue Metformin 1000 mg BID and Januvia 100 daily as well as low carb diet  Foot exam done today  Referred for diabetic eye exam     Acute bilateral low back pain without sciatica  Moist heat BID  Referred to PT  Meloxicam 7 5 mg daily PRN and cyclobenzaprine 5 mg TID for 10 days     Intercostal pain  Counseled patient that it most probably just musculoskeletal  Meloxicam 7 5 mg daily PRN will help  EKG done today with NSR, no ST-T changes        Diagnoses and all orders for this visit:    Type 2 diabetes mellitus without complication, without long-term current use of insulin (Formerly [facility])  -     POCT hemoglobin A1c  -     sitaGLIPtin (JANUVIA) 50 mg tablet; Take 1 tablet (50 mg total) by mouth daily  -     metFORMIN (GLUCOPHAGE) 1000 MG tablet; Take 1 tablet (1,000 mg total) by mouth 2 (two) times a day  -     CBC and differential; Future  -     Comprehensive metabolic panel; Future  -     Lipid panel; Future  -     Microalbumin / creatinine urine ratio; Future  -     TSH, 3rd generation with Free T4 reflex; Future    Acute bilateral low back pain without sciatica  -     Ambulatory referral to Physical Therapy; Future  -     meloxicam (MOBIC) 7 5 mg tablet; Take 1 tablet (7 5 mg total) by mouth daily  -     cyclobenzaprine (FLEXERIL) 5 mg tablet; Take 1 tablet (5 mg total) by mouth 3 (three) times a day as needed for muscle spasms    Mixed hyperlipidemia  -     atorvastatin (LIPITOR) 40 mg tablet; Take 1 tablet (40 mg total) by mouth daily  -     CBC and differential; Future  -     Comprehensive metabolic panel; Future  -     Lipid panel; Future  -     Microalbumin / creatinine urine ratio; Future  -     TSH, 3rd generation with Free T4 reflex;  Future    Diabetic eye exam [facility])  -     Ambulatory referral to Ophthalmology; Future    Encounter for hepatitis C screening test for low risk patient  -     Hepatitis C antibody; Future    Encounter for screening for HIV  -     HIV 1/2 AG-AB combo; Future    Colon cancer screening  -     Ambulatory referral to Gastroenterology; Future    Intercostal pain          Subjective:      Patient ID: Vianney Prince is a 61 y o  male  60 yo male with DM, here today for follow up of chronic conditions  Patient currently complains of low back which stated about 4 weeks and chest tenderness  Low back has improved compared to when it started, denies any history of trauma  Chest pain started about 2 weeks ago, worse with deep breath, movement and to touch  Back Pain   This is a new problem  The current episode started 1 to 4 weeks ago  The problem occurs constantly  The problem has been gradually improving since onset  The pain is present in the lumbar spine  The quality of the pain is described as aching  The pain radiates to the right thigh and left thigh  The pain is at a severity of 7/10  The pain is the same all the time  The symptoms are aggravated by bending, twisting and lying down  Associated symptoms include chest pain and leg pain  Pertinent negatives include no abdominal pain, bladder incontinence, bowel incontinence, dysuria, fever, headaches, numbness, paresis, paresthesias, pelvic pain, perianal numbness, tingling, weakness or weight loss  He has tried heat and home exercises for the symptoms  The treatment provided mild relief  Chest Pain    This is a new problem  The current episode started 1 to 4 weeks ago  The onset quality is gradual  The problem occurs intermittently  The problem has been gradually improving  The pain is present in the substernal region  The pain is at a severity of 5/10  The quality of the pain is described as tightness  The pain radiates to the mid back  Associated symptoms include back pain and leg pain   Pertinent negatives include no abdominal pain, cough, fever, headaches, malaise/fatigue, numbness, orthopnea, palpitations, PND, sputum production, vomiting or weakness  The pain is aggravated by deep breathing, exertion, movement and lifting  He has tried nothing for the symptoms  Risk factors include male gender and lack of exercise  His past medical history is significant for diabetes  Prior diagnostic workup includes echocardiogram        The following portions of the patient's history were reviewed and updated as appropriate: He  has a past medical history of Diabetes mellitus ([facility] [address] )  He   Patient Active Problem List    Diagnosis Date Noted    Acute bilateral low back pain without sciatica 01/27/2020    Intercostal pain 01/27/2020    Cellulitis of face 10/08/2019    Mass of skin 10/22/2018    Skin tags, multiple acquired 10/22/2018    Type 2 diabetes mellitus without complication, without long-term current use of insulin ([facility] [address] ) 10/22/2018     He  has no past surgical history on file  His family history is not on file  He  reports that he has quit smoking  He has never used smokeless tobacco  He reports that he does not drink alcohol or use drugs    Current Outpatient Medications   Medication Sig Dispense Refill    metFORMIN (GLUCOPHAGE) 1000 MG tablet Take 1 tablet (1,000 mg total) by mouth 2 (two) times a day 180 tablet 1    atorvastatin (LIPITOR) 40 mg tablet Take 1 tablet (40 mg total) by mouth daily 90 tablet 3    clotrimazole (LOTRIMIN) 1 % cream Apply topically 2 (two) times a day 30 g 0    cyclobenzaprine (FLEXERIL) 5 mg tablet Take 1 tablet (5 mg total) by mouth 3 (three) times a day as needed for muscle spasms 30 tablet 0    hydrocortisone 2 5 % cream Apply topically 2 (two) times a day 30 g 0    lisinopril (ZESTRIL) 5 mg tablet TAKE ONE TABLET BY MOUTH EVERY DAY 90 tablet 5    meloxicam (MOBIC) 7 5 mg tablet Take 1 tablet (7 5 mg total) by mouth daily 90 tablet 0    sitaGLIPtin (JANUVIA) 50 mg tablet Take 1 tablet (50 mg total) by mouth daily 90 tablet 3     No current facility-administered medications for this visit  Current Outpatient Medications on File Prior to Visit   Medication Sig    [DISCONTINUED] metFORMIN (GLUCOPHAGE) 1000 MG tablet Take 1 tablet (1,000 mg total) by mouth 2 (two) times a day    clotrimazole (LOTRIMIN) 1 % cream Apply topically 2 (two) times a day    hydrocortisone 2 5 % cream Apply topically 2 (two) times a day    lisinopril (ZESTRIL) 5 mg tablet TAKE ONE TABLET BY MOUTH EVERY DAY    [DISCONTINUED] atorvastatin (LIPITOR) 40 mg tablet Take 1 tablet (40 mg total) by mouth daily    [DISCONTINUED] sitaGLIPtin (JANUVIA) 50 mg tablet Take 1 tablet (50 mg total) by mouth daily (Patient not taking: Reported on 1/27/2020)     No current facility-administered medications on file prior to visit       Review of Systems   Constitutional: Negative  Negative for fever, malaise/fatigue and weight loss  HENT: Negative  Eyes: Negative  Respiratory: Negative  Negative for cough and sputum production  Cardiovascular: Positive for chest pain  Negative for palpitations, orthopnea and PND  Gastrointestinal: Negative  Negative for abdominal pain, bowel incontinence and vomiting  Genitourinary: Negative  Negative for bladder incontinence, dysuria and pelvic pain  Musculoskeletal: Positive for back pain  Skin: Negative  Neurological: Negative for tingling, weakness, numbness, headaches and paresthesias  Psychiatric/Behavioral: Negative  Objective:      /60 (BP Location: Right arm, Patient Position: Sitting, Cuff Size: Standard)   Pulse 91   Temp (!) 97 2 °F (36 2 °C) (Temporal)   Resp 18   Wt 91 2 kg (201 lb)   SpO2 97%   BMI 31 29 kg/m²          Physical Exam   Constitutional: He is oriented to person, place, and time  He appears well-developed  HENT:   Head: Normocephalic     Right Ear: External ear normal    Left Ear: External ear normal    Nose: Nose normal  Mouth/Throat: Oropharynx is clear and moist    Eyes: Pupils are equal, round, and reactive to light  Conjunctivae and EOM are normal    Neck: Normal range of motion  Neck supple  No thyromegaly present  Cardiovascular: Normal rate, regular rhythm and normal heart sounds  Pulses:       Dorsalis pedis pulses are 1+ on the right side, and 1+ on the left side  Posterior tibial pulses are 1+ on the right side, and 1+ on the left side  Pulmonary/Chest: Effort normal and breath sounds normal  He exhibits tenderness  Abdominal: Soft  There is no tenderness  There is no rebound and no guarding  Musculoskeletal: Normal range of motion  He exhibits tenderness  Lumbar back: He exhibits tenderness and spasm  Feet:   Right Foot:   Skin Integrity: Negative for ulcer, skin breakdown, erythema, warmth, callus or dry skin  Left Foot:   Skin Integrity: Negative for ulcer, skin breakdown, erythema, warmth, callus or dry skin  Neurological: He is alert and oriented to person, place, and time  He has normal reflexes  Skin: Skin is dry  Psychiatric: He has a normal mood and affect  Nursing note and vitals reviewed  Diabetic Foot Exam    Patient's shoes and socks removed  Right Foot/Ankle   Right Foot Inspection  Skin Exam: skin normal and skin intact no dry skin, no warmth, no callus, no erythema, no maceration, no abnormal color, no pre-ulcer, no ulcer and no callus                          Toe Exam: ROM and strength within normal limits  Sensory   Vibration: intact  Proprioception: intact   Monofilament testing: intact  Vascular  Capillary refills: elevated  The right DP pulse is 1+  The right PT pulse is 1+       Left Foot/Ankle  Left Foot Inspection  Skin Exam: skin normal and skin intactno dry skin, no warmth, no erythema, no maceration, normal color, no pre-ulcer, no ulcer and no callus                         Toe Exam: ROM and strength within normal limits                   Sensory Vibration: intact  Proprioception: intact  Monofilament: intact  Vascular  Capillary refills: elevated  The left DP pulse is 1+  The left PT pulse is 1+  Assign Risk Category:  No deformity present;  No loss of protective sensation;        Risk: 0

## 2020-01-28 LAB — HIV 1+2 AB+HIV1 P24 AG SERPL QL IA: NORMAL

## 2020-02-04 ENCOUNTER — EVALUATION (OUTPATIENT)
Dept: PHYSICAL THERAPY | Facility: REHABILITATION | Age: 60
End: 2020-02-04
Payer: COMMERCIAL

## 2020-02-04 ENCOUNTER — TELEPHONE (OUTPATIENT)
Dept: FAMILY MEDICINE CLINIC | Facility: CLINIC | Age: 60
End: 2020-02-04

## 2020-02-04 DIAGNOSIS — M54.50 ACUTE BILATERAL LOW BACK PAIN WITHOUT SCIATICA: ICD-10-CM

## 2020-02-04 PROCEDURE — 97110 THERAPEUTIC EXERCISES: CPT | Performed by: PHYSICAL THERAPIST

## 2020-02-04 PROCEDURE — 97161 PT EVAL LOW COMPLEX 20 MIN: CPT | Performed by: PHYSICAL THERAPIST

## 2020-02-04 NOTE — PROGRESS NOTES
PT Evaluation     Today's date: 2020  Patient name: Kamla Torres  : 1960  MRN: [id number]  Referring provider: Lizandro Che MD  Dx:   Encounter Diagnosis     ICD-10-CM    1  Acute bilateral low back pain without sciatica M54 5 Ambulatory referral to Physical Therapy                  Assessment  Assessment details: Kamla Torres is a 61 y o  male who presents to physical therapy with diagnosis of acute bilateral low back pain without sciatica  Mitzi Beltrán presents with pain, abnormal posture, and limitations in range of motion, strength, joint mobility, functional ability, and flexibility  He had increased pain with flexion based repeated motion with decreased pain levels with extension based repeated motion testing  The current limitations are affecting Sena's ability to function at prior level  He will benefit from skilled physical therapy to address the current impairments and functional limitations to enable him to return to daily activities at maximal level   Thank you for the referral     He demonstrates good technique with provided home exercise program       Impairments: abnormal or restricted ROM, activity intolerance, impaired physical strength, lacks appropriate home exercise program, pain with function and poor posture     Symptom irritability: lowBarriers to therapy: Language barrier   Understanding of Dx/Px/POC: good   Prognosis: good    Goals  STG  Patient will decrease pain at worst to 2/10  Patient will demonstrate minimal restrictions with lumbar flexion/extension AROM  Patient will be independent with HEP    LTG  Patient will demonstrate pain free lumbar AROM  Patient will report ability to put on shoes/socks without discomfort  Patient will improve walking tolerance to 20 minutes without pain  Patient will improve FOTO score to 55      Plan  Patient would benefit from: skilled physical therapy  Planned modality interventions: cryotherapy and thermotherapy: hydrocollator packs  Planned therapy interventions: manual therapy, neuromuscular re-education, patient education, therapeutic activities, therapeutic exercise, therapeutic training and home exercise program  Frequency: 2x week  Duration in weeks: 6  Treatment plan discussed with: patient        Subjective Evaluation    History of Present Illness  Mechanism of injury: Trev Rodriguez is a 61 y o  male presenting to physical therapy on 20 with primary complaints of low back pain  He presents with his son Willie Crowder who translated throughout the evaluation  He reports the pain started a few years ago and it has progressively gotten worse and that is what brings him in now  He reports pain on the R side is worse than the L  Patient reports he is able to get dressed except has difficulty putting on socks/shoes secondary to pain with bending forward  He mentions he is stiff and feels tight after walking short distances  He is able to ascend/decsend steps one foot over the other, but has pain if he tries to carry any objects up/down the steps  He mentions he is able to complete his household chores he is just slowed down with the tasks secondary to pain  He has pain in about 30 minutes when lying on his stomach and about 5 minutes when lying on his back  He reports he still goes to the gym to perform light cardio but has a little pain while doing so  Pain  Current pain ratin  At worst pain ratin  Location: low back   Quality: dull ache and sharp  Relieving factors: rest  Aggravating factors: walking, standing and lifting    Social Support    Employment status: not working  Treatments  No previous or current treatments  Patient Goals  Patient goals for therapy: decreased pain and increased strength  Patient goal: pain free at the gym          Objective     Postural Observations  Seated posture: fair  Standing posture: fair        Palpation     Additional Palpation Details  Tenderness to palpation along lumbar paraspinals, R piriformis and glute musculature     Active Range of Motion     Lumbar   Flexion:  with pain Restriction level: moderate  Extension:  with pain Restriction level: moderate  Left lateral flexion:  with pain Restriction level: minimal  Right lateral flexion:  with pain Restriction level: minimal  Left rotation:  Restriction level: minimal  Right rotation:  Restriction level: minimal    Additional Active Range of Motion Details  Little pain with rotation  Moderate pain with flex/ext/SB    Joint Play     Hypomobile: L3, L4 and L5     Pain: L3, L4 and L5   Mechanical Assessment    Cervical      Thoracic      Lumbar    Lying flexion: repeated movements  Pain intensity: worse  Pain level: increased  Standing extension: repeated movements  Pain intensity: better  Pain level: decreased  Lying extension: repeated movements  Pain intensity: better  Pain level: decreased    Strength/Myotome Testing     Left Hip   Planes of Motion   Flexion: 4+  Extension: 4  Abduction: 3+    Right Hip   Planes of Motion   Flexion: 4+  Extension: 4-  Abduction: 3+    Left Knee   Flexion: 4+  Extension: 4+    Right Knee   Flexion: 4+  Extension: 4+    Left Ankle/Foot   Dorsiflexion: 5    Right Ankle/Foot   Dorsiflexion: 5    General Comments:      Lumbar Comments  Balance:  - FTEO: good  - FTEC: good                Precautions:  Diabetes, Does not speak/understand much English      Daily Treatment Diary     Assessment 2/4            Eval/Reval SK            FOTO 50    **     **   POC Signed             HEP Issued  SK            Manuals             STM lumbar paraspinals/piriformis nv                                                   Exercise Diary              bike             Standing Ext 5"x10            Prone Press Up 5"x10            PPT 3"x10            Supine clamshell nv            SLR 3 way nv            PPT + March nv            PPT + Bridge nv Modalities 2/4            CP PRN             MHP PRN               X=performed

## 2020-02-05 LAB
LEFT EYE DIABETIC RETINOPATHY: NORMAL
RIGHT EYE DIABETIC RETINOPATHY: NORMAL

## 2020-02-05 PROCEDURE — 2022F DILAT RTA XM EVC RTNOPTHY: CPT | Performed by: FAMILY MEDICINE

## 2020-02-11 ENCOUNTER — OFFICE VISIT (OUTPATIENT)
Dept: PHYSICAL THERAPY | Facility: REHABILITATION | Age: 60
End: 2020-02-11
Payer: COMMERCIAL

## 2020-02-11 DIAGNOSIS — M54.50 ACUTE BILATERAL LOW BACK PAIN WITHOUT SCIATICA: Primary | ICD-10-CM

## 2020-02-11 PROCEDURE — 97112 NEUROMUSCULAR REEDUCATION: CPT

## 2020-02-11 PROCEDURE — 97110 THERAPEUTIC EXERCISES: CPT

## 2020-02-11 NOTE — PROGRESS NOTES
Daily Note     Today's date: 2020  Patient name: Debby Miller  : 1960  MRN: [id number]  Referring provider: Britton Gentile MD  Dx:   Encounter Diagnosis     ICD-10-CM    1  Acute bilateral low back pain without sciatica M54 5          Subjective: Patient noted low back pain R side  Objective: See treatment diary below      Assessment: Tolerated treatment fair  Patient presented to therapy with his son whom is translating  Patient needed continued VC to correct form with PPT  STM helped to decrease fascia restrictions in low back  Patient was able to perform added exercises with good response and noted that his back  felt better post treatment  Patient would benefit from continued PT      Plan: Continue per plan of care  Precautions:  Diabetes, Does not speak/understand much [place]      Daily Treatment Diary     Assessment            Eval/Reval SK            FOTO 50    **     **   POC Signed             HEP Issued  SK            Manuals             STM lumbar paraspinals/piriformis nv 10'                                                  Exercise Diary              bike  5 minutes           Standing Ext 5"x10 5"x10           Prone Press Up 5"x10 5"x10           PPT 3"x10 3"x10           Supine clamshell nv 10x           SLR 3 way nv 10 x ea            PPT + March nv x10           PPT + Bridge nv X 10 3" low bridge                                                                                                                   Modalities            CP PRN  Pt declined           MHP PRN               X=performed

## 2020-02-13 ENCOUNTER — APPOINTMENT (OUTPATIENT)
Dept: PHYSICAL THERAPY | Facility: REHABILITATION | Age: 60
End: 2020-02-13
Payer: COMMERCIAL

## 2020-02-14 ENCOUNTER — OFFICE VISIT (OUTPATIENT)
Dept: PHYSICAL THERAPY | Facility: REHABILITATION | Age: 60
End: 2020-02-14
Payer: COMMERCIAL

## 2020-02-14 DIAGNOSIS — M54.50 ACUTE BILATERAL LOW BACK PAIN WITHOUT SCIATICA: Primary | ICD-10-CM

## 2020-02-14 PROCEDURE — 97112 NEUROMUSCULAR REEDUCATION: CPT | Performed by: PHYSICAL THERAPIST

## 2020-02-14 PROCEDURE — 97110 THERAPEUTIC EXERCISES: CPT | Performed by: PHYSICAL THERAPIST

## 2020-02-14 PROCEDURE — 97140 MANUAL THERAPY 1/> REGIONS: CPT | Performed by: PHYSICAL THERAPIST

## 2020-02-14 NOTE — PROGRESS NOTES
Daily Note     Today's date: 2020  Patient name: Mary Malone  : 1960  MRN: [id number]  Referring provider: John Tolbert MD  Dx:   Encounter Diagnosis     ICD-10-CM    1  Acute bilateral low back pain without sciatica M54 5                   Subjective: patient presents to PT with his son, they reports slight improvement in his low back pain  Objective: See treatment diary below      Assessment: patient tolerated treatment  He requires cuing throughout the session for proper technique, able to maintain corrections after cuing  Well he tolerated increased repetitions well without any c/o increased pain  Tolerated Gr II joint mobs well  Plan: Continue per plan of care  Precautions:  Diabetes, Does not speak/understand much [place]      Daily Treatment Diary     Assessment           Eval/Reval SK            FOTO 50    **     **   POC Signed             HEP Issued  SK            Manuals             STM lumbar paraspinals/piriformis nv 10' SK          L/S PA mobs   Gr II SK                                    Exercise Diary              bike  5 minutes 8 min          Standing Ext 5"x10 5"x10 x          Prone Press Up 5"x10 5"x10 x          PPT 3"x10 3"x10 x          Supine clamshell nv 10x OTB 20x          SLR 3 way nv 10 x ea  2x10 ea          PPT + March nv x10 x20          PPT + Bridge nv X 10 3" low bridge 2x10                                                                                                                  Modalities           CP PRN  Pt declined           MHP PRN               X=performed

## 2020-02-18 ENCOUNTER — OFFICE VISIT (OUTPATIENT)
Dept: PHYSICAL THERAPY | Facility: REHABILITATION | Age: 60
End: 2020-02-18
Payer: COMMERCIAL

## 2020-02-18 DIAGNOSIS — M54.50 ACUTE BILATERAL LOW BACK PAIN WITHOUT SCIATICA: Primary | ICD-10-CM

## 2020-02-18 PROCEDURE — 97112 NEUROMUSCULAR REEDUCATION: CPT

## 2020-02-18 PROCEDURE — 97110 THERAPEUTIC EXERCISES: CPT

## 2020-02-18 NOTE — PROGRESS NOTES
Daily Note     Today's date: 2020  Patient name: Gabo Metcalf  : 1960  MRN: [id number]  Referring provider: Fredy Angel MD  Dx:   Encounter Diagnosis     ICD-10-CM    1  Acute bilateral low back pain without sciatica M54 5                   Subjective: Patient noted that he feels ok today  Objective: See treatment diary below      Assessment: Tolerated treatment fair  Patient continues to need VC throughout treatment to correct form for performing PPT exercises  Tightness decreased in lumbar paraspinals post Patient noted that his back felt looser post treatment  Patient would benefit from continued PT  Plan: Continue per plan of care  Precautions:  Diabetes, Does not speak/understand much [place]      Daily Treatment Diary     Assessment          Eval/Reval SK            FOTO 50    **     **   POC Signed             HEP Issued  SK            Manuals             STM lumbar paraspinals/piriformis nv 10' SK AF         L/S PA mobs   Gr II SK                                    Exercise Diary              bike  5 minutes 8 min 8 min         Standing Ext 5"x10 5"x10 x x         Prone Press Up 5"x10 5"x10 x 5"x10         PPT 3"x10 3"x10 x 3"x10         Supine clamshell nv 10x OTB 20x OTB 20x         SLR 3 way nv 10 x ea  2x10 ea 2 x10 ea         PPT + March nv x10 x20 x20         PPT + Bridge nv X 10 3" low bridge 2x10 2x10                                                                                                                 Modalities           CP PRN  Pt declined           MHP PRN               X=performed

## 2020-02-20 ENCOUNTER — OFFICE VISIT (OUTPATIENT)
Dept: PHYSICAL THERAPY | Facility: REHABILITATION | Age: 60
End: 2020-02-20
Payer: COMMERCIAL

## 2020-02-20 DIAGNOSIS — M54.50 ACUTE BILATERAL LOW BACK PAIN WITHOUT SCIATICA: Primary | ICD-10-CM

## 2020-02-20 PROCEDURE — 97112 NEUROMUSCULAR REEDUCATION: CPT

## 2020-02-20 PROCEDURE — 97110 THERAPEUTIC EXERCISES: CPT

## 2020-02-20 PROCEDURE — 97140 MANUAL THERAPY 1/> REGIONS: CPT

## 2020-02-20 NOTE — PROGRESS NOTES
Daily Note     Today's date: 2020  Patient name: Theodore Manzo  : 1960  MRN: [id number]  Referring provider: Terrell Salazar MD  Dx:   Encounter Diagnosis     ICD-10-CM    1  Acute bilateral low back pain without sciatica M54 5                   Subjective: Patient noted that he was cleaning yesterday and his back pain increased to 8/10  Patient had a hard time sleeping due to pain applied a hot pack for 4 hours and noted that he felt better and was able to sleep  Discussed with patient to use hot pack for 20 min then leave off for an hour then apply again as needed  Patient noted 5/10 pain today in low back R side  Discussed with patient to try performing exercises when he has increased pain in low back along with MHP  Objective: See treatment diary below      Assessment: Tolerated treatment fair  Patient was able to perform added exercises with no pain step taps with DLS and side stepping with DLS  Patient noted back pain diminished post treatment with exercises and manual treatment  MHP not needed due to no pain and lumbar feeling looser post treatment  Patient would benefit from continued PT      Plan: Continue per plan of care  Precautions:  Diabetes, Does not speak/understand much [place]      Daily Treatment Diary     Assessment         Eval/Reval SK            FOTO 50    **NV     **   POC Signed             HEP Issued  SK            Manuals             STM lumbar paraspinals/piriformis nv 10' SK AF AF        L/S PA mobs   Gr II SK                                    Exercise Diary              bike  5 minutes 8 min 8 min 10 min        Standing Ext 5"x10 5"x10 x x 5"x10        Prone Press Up 5"x10 5"x10 x 5"x10 5"x10        PPT 3"x10 3"x10 x 3"x10 3"x10        Supine clamshell nv 10x OTB 20x OTB 20x OTB 20x 3"        SLR 3 way nv 10 x ea  2x10 ea 2 x10 ea 2 x10 ea        PPT + March nv x10 x20 x20 x20        PPT + Bridge nv X 10 3" low bridge 2x10 2x10 3" 2x10 Step taps dls     [id number]        Side stepping dls     2 laps                                                                                      Modalities 2/4 2/11 2/14 2/20        CP PRN  Pt declined           MHP PRN               X=performed

## 2020-02-25 ENCOUNTER — OFFICE VISIT (OUTPATIENT)
Dept: PHYSICAL THERAPY | Facility: REHABILITATION | Age: 60
End: 2020-02-25
Payer: COMMERCIAL

## 2020-02-25 DIAGNOSIS — M54.50 ACUTE BILATERAL LOW BACK PAIN WITHOUT SCIATICA: Primary | ICD-10-CM

## 2020-02-25 PROCEDURE — 97140 MANUAL THERAPY 1/> REGIONS: CPT

## 2020-02-25 PROCEDURE — 97112 NEUROMUSCULAR REEDUCATION: CPT

## 2020-02-25 PROCEDURE — 97110 THERAPEUTIC EXERCISES: CPT

## 2020-02-25 NOTE — PROGRESS NOTES
Daily Note     Today's date: 2020  Patient name: Jackelyn Peterson  : 1960  MRN: [id number]  Referring provider: Boston Arias MD  Dx:   Encounter Diagnosis     ICD-10-CM    1  Acute bilateral low back pain without sciatica M54 5                   Subjective: Patient's son noted that the patient noted that he went to the gym Saturday and went on the bike,  his back felt a little weird he performed the exercises and back felt better  Patient noted pain in when sitting in chair in waiting room diminished when he stood up  Patient noted that he also went swimming over the weekend and noted increased soreness  in his back  Objective: See treatment diary below      Assessment: Trailed adding 1 # weight to SLR abd flex patient was able to perform but noted pain in back with last rep  Pain in back from last rep of SLR diminished with prone press ups  Patient trailed performing bridges and supine PPT marches patient  noted pain R side low back with performing stopped exercises and performed STM to lumbar  Patient responded well to [facility] in lumbar and post was able to perform supine marches with no pain in back and step taps and side stepping with no pain in back  Patient noted post treatment his back felt looser and no pain  Plan: Continue per plan of care  Precautions:  Diabetes, Does not speak/understand much [place]      Daily Treatment Diary     Assessment        Eval/Reval SK            FOTO 50    **NV     **   POC Signed             HEP Issued  SK            Manuals             STM lumbar paraspinals/piriformis nv 10' SK AF AF AF       L/S PA mobs   Gr II SK                                    Exercise Diary              bike  5 minutes 8 min 8 min 10 min 10min       Standing Ext 5"x10 5"x10 x x 5"x10 5"x10       Prone Press Up 5"x10 5"x10 x 5"x10 5"x10        PPT 3"x10 3"x10 x 3"x10 3"x10 5"x10       Supine clamshell nv 10x OTB 20x OTB 20x OTB 20x 3" S/l OTB 15xea       SLR 3 way nv 10 x ea  2x10 ea 2 x10 ea 2 x10 ea 15x ea 1#       PPT + March nv x10 x20 x20 x20 x20       PPT + Bridge nv X 10 3" low bridge 2x10 2x10 3" 2x10 np       Step taps dls     [id number]       Side stepping dls     2 laps 4 laps                                                                                      Modalities 2/4 2/11 2/14  2/20        CP PRN  Pt declined           MHP PRN               X=performed

## 2020-02-27 ENCOUNTER — OFFICE VISIT (OUTPATIENT)
Dept: PHYSICAL THERAPY | Facility: REHABILITATION | Age: 60
End: 2020-02-27
Payer: COMMERCIAL

## 2020-02-27 ENCOUNTER — TELEPHONE (OUTPATIENT)
Dept: FAMILY MEDICINE CLINIC | Facility: CLINIC | Age: 60
End: 2020-02-27

## 2020-02-27 DIAGNOSIS — M54.50 ACUTE BILATERAL LOW BACK PAIN WITHOUT SCIATICA: Primary | ICD-10-CM

## 2020-02-27 PROCEDURE — 97110 THERAPEUTIC EXERCISES: CPT

## 2020-02-27 PROCEDURE — 97140 MANUAL THERAPY 1/> REGIONS: CPT

## 2020-02-27 PROCEDURE — 97112 NEUROMUSCULAR REEDUCATION: CPT

## 2020-02-27 NOTE — TELEPHONE ENCOUNTER
Pharmacy called to inform that pt is taking atorvastatin 40mg three times a day for 120mg pharmacy want to just inform just in case you wanted to call in blood work

## 2020-02-27 NOTE — PROGRESS NOTES
Daily Note     Today's date: 2020  Patient name: Ketan uPlliam  : 1960  MRN: [id number]  Referring provider: Seun Rodriguez MD  Dx:   Encounter Diagnosis     ICD-10-CM    1  Acute bilateral low back pain without sciatica M54 5            1:1 with PTA CR 9:00- 9:50  Subjective: Sore after performing updated HEP but not severe  Objective: See treatment diary below      Assessment: Progressed glute med strengthening  Poor understanding of proper TA recruitment requiring cues to avoid valsalva  Pain reported with majority of exercises due to poor technique  Tactile cues and demonstration provided but still has poor understanding despite translation from son  Continue to stress technique NV  Plan: Continue per plan of care  Precautions:  Diabetes, Does not speak/understand much [place]  Daily Treatment Diary     Assessment       Eval/Reval SK            FOTO 50    **NV  50   **   POC Signed             HEP Issued  SK            Manuals             STM lumbar paraspinals/piriformis nv 10' SK AF AF AF CR  10 mins      L/S PA mobs   Gr II SK    NP                                Exercise Diary              bike  5 minutes 8 min 8 min 10 min 10min L1  10 mins      Standing Ext 5"x10 5"x10 x x 5"x10 5"x10 5"x10      Prone Press Up 5"x10 5"x10 x 5"x10 5"x10  W/ OP  5"x10      PPT 3"x10 3"x10 x 3"x10 3"x10 5"x10 5"x10      Supine clamshell nv 10x OTB 20x OTB 20x OTB 20x 3" S/l OTB 15xea sidelying  OTB  15 ea  SLR 3 way nv 10 x ea  2x10 ea 2 x10 ea 2 x10 ea 15x ea 1# 1#  15 ea  abd/ext  PPT + March nv x10 x20 x20 x20 x20 20 ea  PPT + Bridge nv X 10 3" low bridge 2x10 2x10 3" 2x10 np NV      Step taps dls     [id number] 15 ea         Side stepping dls     2 laps 4 laps  OTB  4 laps                                                                                    Modalities       CP PRN  Pt declined     declined DIANNAP PRN               X=performed

## 2020-02-27 NOTE — TELEPHONE ENCOUNTER
Please call patient make sure he understands it should only be once a day  No BW at this time  Will check BW in 3 months

## 2020-03-03 ENCOUNTER — OFFICE VISIT (OUTPATIENT)
Dept: PHYSICAL THERAPY | Facility: REHABILITATION | Age: 60
End: 2020-03-03
Payer: COMMERCIAL

## 2020-03-03 DIAGNOSIS — M54.50 ACUTE BILATERAL LOW BACK PAIN WITHOUT SCIATICA: Primary | ICD-10-CM

## 2020-03-03 PROCEDURE — 97140 MANUAL THERAPY 1/> REGIONS: CPT

## 2020-03-03 PROCEDURE — 97110 THERAPEUTIC EXERCISES: CPT

## 2020-03-03 NOTE — PROGRESS NOTES
Daily Note     Today's date: 3/3/2020  Patient name: Rashida Turner  : 1960  MRN: [id number]  Referring provider: Arnold Kaur MD  Dx:   Encounter Diagnosis     ICD-10-CM    1  Acute bilateral low back pain without sciatica M54 5                   Subjective: Patient reports that he woke up in the middle of the night with 7/10 pain  Patient reports that his back feels very tight this morning but his pain is less  Patient rates his current pain level as a 1/10  Objective: See treatment diary below  + palpable R lumbar/lower thoracic paraspinal tightness  Assessment: Tolerated treatment well  Patient exhibited good technique with therapeutic exercises and would benefit from continued PT  Patient reported no pain post visit  Plan: Continue per plan of care  Progress treatment as tolerated  Precautions:  Diabetes, Does not speak/understand much [place]  Patient's son accompanied the patient throughout the visit  Daily Treatment Diary     Assessment 2/4 2/11 2/14 2/18 2/20 2/25 2/27 3/3     Eval/Reval SK            FOTO 50    **NV  50   **   POC Signed             HEP Issued  SK            Manuals             STM lumbar paraspinals/piriformis nv 10' SK AF AF AF CR  10 mins CC  10 min     L/S PA mobs   Gr II SK    NP                                Exercise Diary        2/27 3/3     bike  5 minutes 8 min 8 min 10 min 10min L1  10 mins L1  10 min     Standing Ext 5"x10 5"x10 x x 5"x10 5"x10 5"x10 5" x10     Prone Press Up 5"x10 5"x10 x 5"x10 5"x10  W/ OP  5"x10 W/op  5"x10     PPT 3"x10 3"x10 x 3"x10 3"x10 5"x10 5"x10 5:x10     Supine clamshell nv 10x OTB 20x OTB 20x OTB 20x 3" S/l OTB 15xea sidelying  OTB  15 ea  S/L OTB  x15     SLR 3 way nv 10 x ea  2x10 ea 2 x10 ea 2 x10 ea 15x ea 1# 1#  15 ea  abd/ext  1#  15ea  3 way     PPT + March nv x10 x20 x20 x20 x20 20 ea  20 ea     PPT + Bridge nv X 10 3" low bridge 2x10 2x10 3" 2x10 np NV NV     Step taps dls     80VAM 03JRM 15 ea  x15     Side stepping dls     2 laps 4 laps  OTB  4 laps OTB  4 laps                                                                                   Modalities 2/4 2/11 2/14 2/20 2/27      CP PRN  Pt declined     declined      MHP PRN               X=performed

## 2020-03-05 ENCOUNTER — OFFICE VISIT (OUTPATIENT)
Dept: PHYSICAL THERAPY | Facility: REHABILITATION | Age: 60
End: 2020-03-05
Payer: COMMERCIAL

## 2020-03-05 DIAGNOSIS — M54.50 ACUTE BILATERAL LOW BACK PAIN WITHOUT SCIATICA: Primary | ICD-10-CM

## 2020-03-05 PROCEDURE — 97112 NEUROMUSCULAR REEDUCATION: CPT

## 2020-03-05 PROCEDURE — 97110 THERAPEUTIC EXERCISES: CPT

## 2020-03-05 PROCEDURE — 97140 MANUAL THERAPY 1/> REGIONS: CPT

## 2020-03-05 NOTE — PROGRESS NOTES
Daily Note     Today's date: 3/5/2020  Patient name: Philip Hernandez  : 1960  MRN: [id number]  Referring provider: Domingo Rios MD  Dx:   Encounter Diagnosis     ICD-10-CM    1  Acute bilateral low back pain without sciatica M54 5                   Subjective: Pt reports his back aches when sitting  Objective: See treatment diary below      Assessment: Pt reports tenderness with piriformis TPR  Soreness post tx, pt educated on the importance of movement throughout his day as well as HEP compliance  Plan: Continue per plan of care  Precautions:  Diabetes, Does not speak/understand much [place]  Patient's son accompanied the patient throughout the visit  Daily Treatment Diary     Assessment 2/4 2/11 2/14 2/18 2/20 2/25 2/27 3/3 3/5    Eval/Reval SK            FOTO 50    **NV  50   **   POC Signed             HEP Issued  SK            Manuals             STM lumbar paraspinals/piriformis nv 10' SK AF AF AF CR  10 mins CC  10 min HS x10'    L/S PA mobs   Gr II SK    NP                                Exercise Diary        2/27 3/3 3/5    bike  5 minutes 8 min 8 min 10 min 10min L1  10 mins L1  10 min L1 10'    Standing Ext 5"x10 5"x10 x x 5"x10 5"x10 5"x10 5" x10 5"x10    Prone Press Up 5"x10 5"x10 x 5"x10 5"x10  W/ OP  5"x10 W/op  5"x10 W/ op 5" 2x10    PPT 3"x10 3"x10 x 3"x10 3"x10 5"x10 5"x10 5:x10 5" 2x10    Supine clamshell nv 10x OTB 20x OTB 20x OTB 20x 3" S/l OTB 15xea sidelying  OTB  15 ea  S/L OTB  x15 2x10    SLR 3 way nv 10 x ea  2x10 ea 2 x10 ea 2 x10 ea 15x ea 1# 1#  15 ea  abd/ext  1#  15ea  3 way 1# 15 ea 3-way    PPT + March nv x10 x20 x20 x20 x20 20 ea  20 ea 20 ea    PPT + Bridge nv X 10 3" low bridge 2x10 2x10 3" 2x10 np NV NV     Step taps dls     61KDD 56LCZ 15 ea    x15 x15    Side stepping dls     2 laps 4 laps  OTB  4 laps OTB  4 laps OTB 4 laps                                                                                  Modalities  CP PRN  Pt declined     declined      MHP PRN               X=performed

## 2020-03-10 ENCOUNTER — EVALUATION (OUTPATIENT)
Dept: PHYSICAL THERAPY | Facility: REHABILITATION | Age: 60
End: 2020-03-10
Payer: COMMERCIAL

## 2020-03-10 DIAGNOSIS — M54.50 ACUTE BILATERAL LOW BACK PAIN WITHOUT SCIATICA: Primary | ICD-10-CM

## 2020-03-10 PROCEDURE — 97140 MANUAL THERAPY 1/> REGIONS: CPT | Performed by: PHYSICAL MEDICINE & REHABILITATION

## 2020-03-10 PROCEDURE — 97110 THERAPEUTIC EXERCISES: CPT | Performed by: PHYSICAL MEDICINE & REHABILITATION

## 2020-03-10 NOTE — PROGRESS NOTES
PT Evaluation     Today's date: 3/10/2020  Patient name: Faustino Anaya  : 1960  MRN: [id number]  Referring provider: Willie Diaz MD  Dx:   Encounter Diagnosis     ICD-10-CM    1  Acute bilateral low back pain without sciatica M54 5        Start Time: 0900  Stop Time: 1000  Total time in clinic (min): 60 minutes    Assessment  Assessment details: Rianna De La Garza has been attending outpatient physical therapy with Acute bilateral low back pain   Since the last assessment, patient demonstrates decreased pain levels, improved range of motion, improved strength, and increased tolerance to activity  Pt continues to present with pain, demonstrate decreased range of motion, decreased strength, and decreased tolerance to activity  Pt would benefit from continued skilled physical therapy to address limitations and to achieve goals   Thank you for this referral    Impairments: abnormal or restricted ROM, activity intolerance, impaired physical strength, lacks appropriate home exercise program, pain with function and poor posture     Symptom irritability: lowBarriers to therapy: Language barrier   Understanding of Dx/Px/POC: good   Prognosis: good    Goals  STG  Patient will decrease pain at worst to 2/10 IMPROVED  Patient will demonstrate minimal restrictions with lumbar flexion/extension AROM IMPROVED  Patient will be independent with HEP PROGRESSING    LTG  Patient will demonstrate pain free lumbar AROM  IMPROVED  Patient will report ability to put on shoes/socks without discomfort MET  Patient will improve walking tolerance to 20 minutes without pain PROGRESSING  Patient will improve FOTO score to 55      Plan  Patient would benefit from: skilled physical therapy  Planned modality interventions: cryotherapy and thermotherapy: hydrocollator packs  Planned therapy interventions: manual therapy, neuromuscular re-education, patient education, therapeutic activities, therapeutic exercise, therapeutic training and home exercise program  Frequency: 2x week  Duration in weeks: 4  Treatment plan discussed with: patient        Subjective Evaluation    History of Present Illness  Mechanism of injury: Pts son is present for translation  Since beginning OPPT the pt feels that his sxs have decreased in frequency and intensity  He continues to have intermittent sxs typically associated with sitting for prolonged periods of time and standing  He he is currently going to the gym 1 or 2 times per week and is swimming, walking on the TM, and stationary bike  He is able to stand for approximately 30 minutes, and sit for < 5 minutes  Pain  Current pain ratin  At worst pain ratin  Location: low back   Quality: dull ache and tight  Relieving factors: rest  Aggravating factors: walking, standing and lifting    Social Support    Employment status: not working  Treatments  No previous or current treatments  Patient Goals  Patient goals for therapy: decreased pain and increased strength  Patient goal: pain free at the gym          Objective     Postural Observations  Seated posture: fair  Standing posture: fair        Palpation     Additional Palpation Details  Tenderness to palpation along lumbar paraspinals, R piriformis and glute musculature     Active Range of Motion     Lumbar   Flexion:  with pain Restriction level: moderate  Extension:  with pain Restriction level: moderate  Left lateral flexion:  with pain Restriction level: minimal  Right lateral flexion:  with pain Restriction level: minimal  Left rotation:  Restriction level: minimal  Right rotation:  Restriction level: minimal    Additional Active Range of Motion Details  Little pain with rotation  Moderate pain with flex/ext/SB    Joint Play     Hypomobile: L3, L4 and L5     Pain: L3, L4 and L5   Mechanical Assessment    Cervical      Thoracic      Lumbar    Lying flexion: repeated movements  Pain intensity: worse  Pain level: increased    Strength/Myotome Testing     Left Hip   Planes of Motion   Flexion: 5  Extension: 4+  Abduction: 4    Right Hip   Planes of Motion   Flexion: 5  Extension: 4+  Abduction: 4    Left Knee   Prone flexion: 4    Right Knee   Prone flexion: 4    Left Ankle/Foot   Dorsiflexion: 5    Right Ankle/Foot   Dorsiflexion: 5    General Comments:      Lumbar Comments                Precautions:  Diabetes, Does not speak/understand much [place]   Patient's son accompanied the patient throughout the visit        Daily Treatment Diary      Assessment 2/4 2/11 2/14 2/18 2/20 2/25 2/27 3/3 3/5     Eval/Reval SK                     FOTO 50       **NV   50     **   POC Signed                       HEP Issued  SK                     Manuals             2/27         MFD (static/ c prayer stretch,glide)              IASTM             L/S PA mobs     Gr II SK       NP      NC Gr V    thoracic PA                    NC  Gr V                          Exercise Diary              2/27 3/3 3/5     bike   5 minutes 8 min 8 min 10 min 10min L1  10 mins L1  10 min L1 10'  L2 5' due to time   Prayer stretch              prone alt UE/LE                    10 ea c 5" hold    multifidi press                    BTB  15 c 5"    hamstring stretch                        hip hike to neutral                       Resisted HS curl                        kettlebell suitcase carry                       Modalities 2/4 2/11 2/14 2/20 2/27         CP PRN   Pt declined         declined         MHP PRN                          X=performed

## 2020-03-12 ENCOUNTER — OFFICE VISIT (OUTPATIENT)
Dept: PHYSICAL THERAPY | Facility: REHABILITATION | Age: 60
End: 2020-03-12
Payer: COMMERCIAL

## 2020-03-12 DIAGNOSIS — M54.50 ACUTE BILATERAL LOW BACK PAIN WITHOUT SCIATICA: Primary | ICD-10-CM

## 2020-03-12 PROCEDURE — 97110 THERAPEUTIC EXERCISES: CPT | Performed by: PHYSICAL MEDICINE & REHABILITATION

## 2020-03-12 PROCEDURE — 97140 MANUAL THERAPY 1/> REGIONS: CPT | Performed by: PHYSICAL MEDICINE & REHABILITATION

## 2020-03-12 PROCEDURE — 97112 NEUROMUSCULAR REEDUCATION: CPT | Performed by: PHYSICAL MEDICINE & REHABILITATION

## 2020-03-12 NOTE — PROGRESS NOTES
Daily Note     Today's date: 3/12/2020  Patient name: Florence Palma  : 1960  MRN: [id number]  Referring provider: Monse Jordan MD  Dx:   Encounter Diagnosis     ICD-10-CM    1  Acute bilateral low back pain without sciatica M54 5        Start Time: 855  Stop Time: 945  Total time in clinic (min): 50 minutes    Subjective: Pt states that he is feeling better today, improved mobility  Objective: See treatment diary below      Assessment: Tolerated treatment well  Patient demonstrated fatigue post treatment, exhibited good technique with therapeutic exercises, would benefit from continued PT and improved quality and quantity of movement following manual tx  Plan: Progress treatment as tolerated  Precautions:  Diabetes, Does not speak/understand much [place]   Patient's son accompanied the patient throughout the visit        Daily Treatment Diary      Assessment 3/12 2/11 2/14 2/18 2/20 2/25 2/27 3/3 3/5     Eval/Reval                      FOTO        **NV   50     **   POC Signed                      HEP Issued                       Manuals  3/12           2/27         MFD (static/ c prayer stretch,glide)              IASTM             L/S PA mobs  NC  Gr IV   Gr II SK       NP      NC Gr V    thoracic PA  NC  Gr V                  NC  Gr V                          Exercise Diary   3/12           2/27 3/3 3/5     bike  10'  L2 5 minutes 8 min 8 min 10 min 10min L1  10 mins L1  10 min L1 10'  L2 5' due to time   Prayer stretch 10" x 5 ea             prone alt UE/LE  HEP                  10 ea c 5" hold    multifidi press                    BTB  15 c 5"    hamstring stretch                        hip hike to neutral  30 ea                     Resisted HS curl                       Resisted hip abd 20   15#             kettlebell suitcase carry  20#  5 laps ea                     Modalities          CP PRN   Pt declined         declined         MHP PRN                          X=performed

## 2020-03-17 ENCOUNTER — OFFICE VISIT (OUTPATIENT)
Dept: PHYSICAL THERAPY | Facility: REHABILITATION | Age: 60
End: 2020-03-17
Payer: COMMERCIAL

## 2020-03-17 DIAGNOSIS — M54.50 ACUTE BILATERAL LOW BACK PAIN WITHOUT SCIATICA: Primary | ICD-10-CM

## 2020-03-17 PROCEDURE — 97110 THERAPEUTIC EXERCISES: CPT | Performed by: PHYSICAL MEDICINE & REHABILITATION

## 2020-03-17 PROCEDURE — 97112 NEUROMUSCULAR REEDUCATION: CPT | Performed by: PHYSICAL MEDICINE & REHABILITATION

## 2020-03-17 NOTE — PROGRESS NOTES
Daily Note     Today's date: 3/17/2020  Patient name: Ronni Witt  : 1960  MRN: [id number]  Referring provider: Abilio Connolly MD  Dx:   Encounter Diagnosis     ICD-10-CM    1  Acute bilateral low back pain without sciatica M54 5        Start Time: 855  Stop Time: 945  Total time in clinic (min): 50 minutes    Subjective: Pt reports with no complaint of pain currently  Objective: See treatment diary below      Assessment: Tolerated treatment well  Patient demonstrated fatigue post treatment, exhibited good technique with therapeutic exercises and would benefit from continued PT  Continues to demonstrated deficits in lumbar and thoracic mobility  Plan: Progress treatment as tolerated  Precautions:  Diabetes, Does not speak/understand much [place]   Patient's son accompanied the patient throughout the visit        Daily Treatment Diary      Assessment 3/12 2/11 2/14 2/18 2/20 2/25 2/27 3/3 3/5     Eval/Reval                      FOTO        **NV   50     **   POC Signed                      HEP Issued                       Manuals  3/12           2/27         MFD (static/ c prayer stretch,glide)              IASTM             L/S PA mobs  NC  Gr IV   Gr II SK       NP      NC Gr V    thoracic PA  NC  Gr V                  NC  Gr V                          Exercise Diary   3/12 3/17         2/27 3/3 3/5     bike  10'  L2 10'  Lv 3 8 min 8 min 10 min 10min L1  10 mins L1  10 min L1 10'  L2 5' due to time   Open books  10 x 5" ea           Prayer stretch 10" x 5 ea 10' x 10            prone alt UE/LE  HEP                 10 ea c 5" hold   LTR    10 c 5"            multifidi press                   BTB  15 c 5"    hamstring stretch   3 x 30"                    hip hike to neutral  30 ea 30 ea                   Resisted HS curl   30#  20                   Resisted hip abd 20   15#             kettlebell suitcase carry  20#  5 laps ea                    Modalities          CP PRN            declined         MHP PRN                          X=performed

## 2020-03-19 ENCOUNTER — APPOINTMENT (OUTPATIENT)
Dept: PHYSICAL THERAPY | Facility: REHABILITATION | Age: 60
End: 2020-03-19
Payer: COMMERCIAL

## 2020-03-24 ENCOUNTER — APPOINTMENT (OUTPATIENT)
Dept: PHYSICAL THERAPY | Facility: REHABILITATION | Age: 60
End: 2020-03-24
Payer: COMMERCIAL

## 2020-03-26 ENCOUNTER — APPOINTMENT (OUTPATIENT)
Dept: PHYSICAL THERAPY | Facility: REHABILITATION | Age: 60
End: 2020-03-26
Payer: COMMERCIAL

## 2020-05-13 ENCOUNTER — TRANSCRIBE ORDERS (OUTPATIENT)
Dept: LAB | Facility: HOSPITAL | Age: 60
End: 2020-05-13

## 2020-05-13 ENCOUNTER — APPOINTMENT (OUTPATIENT)
Dept: LAB | Facility: HOSPITAL | Age: 60
End: 2020-05-13
Payer: COMMERCIAL

## 2020-05-13 DIAGNOSIS — R79.89 ABNORMAL THYROID STIMULATING HORMONE (TSH) LEVEL: ICD-10-CM

## 2020-05-13 DIAGNOSIS — E13.65: ICD-10-CM

## 2020-05-13 DIAGNOSIS — E13.65: Primary | ICD-10-CM

## 2020-05-13 LAB
ALBUMIN SERPL BCP-MCNC: 4 G/DL (ref 3.5–5)
ALP SERPL-CCNC: 50 U/L (ref 46–116)
ALT SERPL W P-5'-P-CCNC: 28 U/L (ref 12–78)
ANION GAP SERPL CALCULATED.3IONS-SCNC: 5 MMOL/L (ref 4–13)
AST SERPL W P-5'-P-CCNC: 16 U/L (ref 5–45)
BASOPHILS # BLD AUTO: 0.06 THOUSANDS/ΜL (ref 0–0.1)
BASOPHILS NFR BLD AUTO: 1 % (ref 0–1)
BILIRUB SERPL-MCNC: 0.47 MG/DL (ref 0.2–1)
BUN SERPL-MCNC: 13 MG/DL (ref 5–25)
CALCIUM SERPL-MCNC: 8.7 MG/DL (ref 8.3–10.1)
CHLORIDE SERPL-SCNC: 108 MMOL/L (ref 100–108)
CO2 SERPL-SCNC: 26 MMOL/L (ref 21–32)
CREAT SERPL-MCNC: 0.77 MG/DL (ref 0.6–1.3)
EOSINOPHIL # BLD AUTO: 0.16 THOUSAND/ΜL (ref 0–0.61)
EOSINOPHIL NFR BLD AUTO: 2 % (ref 0–6)
ERYTHROCYTE [DISTWIDTH] IN BLOOD BY AUTOMATED COUNT: 13.5 % (ref 11.6–15.1)
EST. AVERAGE GLUCOSE BLD GHB EST-MCNC: 160 MG/DL
GFR SERPL CREATININE-BSD FRML MDRD: 99 ML/MIN/1.73SQ M
GLUCOSE P FAST SERPL-MCNC: 116 MG/DL (ref 65–99)
HBA1C MFR BLD: 7.2 %
HCT VFR BLD AUTO: 45.1 % (ref 36.5–49.3)
HGB BLD-MCNC: 15 G/DL (ref 12–17)
IMM GRANULOCYTES # BLD AUTO: 0.02 THOUSAND/UL (ref 0–0.2)
IMM GRANULOCYTES NFR BLD AUTO: 0 % (ref 0–2)
LYMPHOCYTES # BLD AUTO: 4.39 THOUSANDS/ΜL (ref 0.6–4.47)
LYMPHOCYTES NFR BLD AUTO: 47 % (ref 14–44)
MCH RBC QN AUTO: 30.2 PG (ref 26.8–34.3)
MCHC RBC AUTO-ENTMCNC: 33.3 G/DL (ref 31.4–37.4)
MCV RBC AUTO: 91 FL (ref 82–98)
MONOCYTES # BLD AUTO: 0.36 THOUSAND/ΜL (ref 0.17–1.22)
MONOCYTES NFR BLD AUTO: 4 % (ref 4–12)
NEUTROPHILS # BLD AUTO: 4.45 THOUSANDS/ΜL (ref 1.85–7.62)
NEUTS SEG NFR BLD AUTO: 46 % (ref 43–75)
NRBC BLD AUTO-RTO: 0 /100 WBCS
PLATELET # BLD AUTO: 177 THOUSANDS/UL (ref 149–390)
PMV BLD AUTO: 11.3 FL (ref 8.9–12.7)
POTASSIUM SERPL-SCNC: 3.7 MMOL/L (ref 3.5–5.3)
PROT SERPL-MCNC: 8.5 G/DL (ref 6.4–8.2)
RBC # BLD AUTO: 4.97 MILLION/UL (ref 3.88–5.62)
SODIUM SERPL-SCNC: 139 MMOL/L (ref 136–145)
T4 FREE SERPL-MCNC: 1.2 NG/DL (ref 0.76–1.46)
WBC # BLD AUTO: 9.44 THOUSAND/UL (ref 4.31–10.16)

## 2020-05-13 PROCEDURE — 80053 COMPREHEN METABOLIC PANEL: CPT

## 2020-05-13 PROCEDURE — 84445 ASSAY OF TSI GLOBULIN: CPT

## 2020-05-13 PROCEDURE — 36415 COLL VENOUS BLD VENIPUNCTURE: CPT

## 2020-05-13 PROCEDURE — 85025 COMPLETE CBC W/AUTO DIFF WBC: CPT

## 2020-05-13 PROCEDURE — 83036 HEMOGLOBIN GLYCOSYLATED A1C: CPT

## 2020-05-13 PROCEDURE — 84439 ASSAY OF FREE THYROXINE: CPT

## 2020-05-15 LAB — TSI SER-ACNC: <0.1 IU/L (ref 0–0.55)

## 2020-05-28 ENCOUNTER — OFFICE VISIT (OUTPATIENT)
Dept: URGENT CARE | Age: 60
End: 2020-05-28
Payer: COMMERCIAL

## 2020-05-28 VITALS
BODY MASS INDEX: 34.19 KG/M2 | HEART RATE: 91 BPM | TEMPERATURE: 97 F | WEIGHT: 205.2 LBS | OXYGEN SATURATION: 99 % | HEIGHT: 65 IN | SYSTOLIC BLOOD PRESSURE: 130 MMHG | DIASTOLIC BLOOD PRESSURE: 79 MMHG | RESPIRATION RATE: 17 BRPM

## 2020-05-28 DIAGNOSIS — R21 RASH: Primary | ICD-10-CM

## 2020-05-28 PROCEDURE — 99213 OFFICE O/P EST LOW 20 MIN: CPT | Performed by: NURSE PRACTITIONER

## 2020-05-28 RX ORDER — CLOTRIMAZOLE AND BETAMETHASONE DIPROPIONATE 10; .64 MG/G; MG/G
CREAM TOPICAL 2 TIMES DAILY
Qty: 30 G | Refills: 0 | Status: SHIPPED | OUTPATIENT
Start: 2020-05-28 | End: 2020-09-02 | Stop reason: ALTCHOICE

## 2020-08-12 ENCOUNTER — APPOINTMENT (OUTPATIENT)
Dept: LAB | Age: 60
End: 2020-08-12
Payer: COMMERCIAL

## 2020-08-12 ENCOUNTER — TRANSCRIBE ORDERS (OUTPATIENT)
Dept: ADMINISTRATIVE | Facility: HOSPITAL | Age: 60
End: 2020-08-12

## 2020-08-12 DIAGNOSIS — E78.5 HYPERLIPIDEMIA, UNSPECIFIED HYPERLIPIDEMIA TYPE: ICD-10-CM

## 2020-08-12 DIAGNOSIS — R79.89 ABNORMAL THYROID STIMULATING HORMONE LEVEL: ICD-10-CM

## 2020-08-12 DIAGNOSIS — E11.649 UNCONTROLLED TYPE 2 DIABETES MELLITUS WITH HYPOGLYCEMIA, UNSPECIFIED HYPOGLYCEMIA COMA STATUS (HCC): ICD-10-CM

## 2020-08-12 DIAGNOSIS — E78.5 HYPERLIPIDEMIA, UNSPECIFIED HYPERLIPIDEMIA TYPE: Primary | ICD-10-CM

## 2020-08-12 LAB
ALBUMIN SERPL BCP-MCNC: 3.7 G/DL (ref 3.5–5)
ALP SERPL-CCNC: 46 U/L (ref 46–116)
ALT SERPL W P-5'-P-CCNC: 25 U/L (ref 12–78)
ANION GAP SERPL CALCULATED.3IONS-SCNC: 7 MMOL/L (ref 4–13)
AST SERPL W P-5'-P-CCNC: 12 U/L (ref 5–45)
BASOPHILS # BLD AUTO: 0.05 THOUSANDS/ΜL (ref 0–0.1)
BASOPHILS NFR BLD AUTO: 1 % (ref 0–1)
BILIRUB SERPL-MCNC: 0.58 MG/DL (ref 0.2–1)
BUN SERPL-MCNC: 8 MG/DL (ref 5–25)
CALCIUM SERPL-MCNC: 8.7 MG/DL (ref 8.3–10.1)
CHLORIDE SERPL-SCNC: 109 MMOL/L (ref 100–108)
CHOLEST SERPL-MCNC: 96 MG/DL (ref 50–200)
CO2 SERPL-SCNC: 28 MMOL/L (ref 21–32)
CREAT SERPL-MCNC: 0.99 MG/DL (ref 0.6–1.3)
EOSINOPHIL # BLD AUTO: 0.2 THOUSAND/ΜL (ref 0–0.61)
EOSINOPHIL NFR BLD AUTO: 2 % (ref 0–6)
ERYTHROCYTE [DISTWIDTH] IN BLOOD BY AUTOMATED COUNT: 12.7 % (ref 11.6–15.1)
EST. AVERAGE GLUCOSE BLD GHB EST-MCNC: 214 MG/DL
GFR SERPL CREATININE-BSD FRML MDRD: 83 ML/MIN/1.73SQ M
GLUCOSE P FAST SERPL-MCNC: 140 MG/DL (ref 65–99)
HBA1C MFR BLD: 9.1 %
HCT VFR BLD AUTO: 44.1 % (ref 36.5–49.3)
HDLC SERPL-MCNC: 31 MG/DL
HGB BLD-MCNC: 14.3 G/DL (ref 12–17)
IMM GRANULOCYTES # BLD AUTO: 0.02 THOUSAND/UL (ref 0–0.2)
IMM GRANULOCYTES NFR BLD AUTO: 0 % (ref 0–2)
LDLC SERPL CALC-MCNC: 43 MG/DL (ref 0–100)
LYMPHOCYTES # BLD AUTO: 4.24 THOUSANDS/ΜL (ref 0.6–4.47)
LYMPHOCYTES NFR BLD AUTO: 49 % (ref 14–44)
MCH RBC QN AUTO: 29.5 PG (ref 26.8–34.3)
MCHC RBC AUTO-ENTMCNC: 32.4 G/DL (ref 31.4–37.4)
MCV RBC AUTO: 91 FL (ref 82–98)
MONOCYTES # BLD AUTO: 0.35 THOUSAND/ΜL (ref 0.17–1.22)
MONOCYTES NFR BLD AUTO: 4 % (ref 4–12)
NEUTROPHILS # BLD AUTO: 3.75 THOUSANDS/ΜL (ref 1.85–7.62)
NEUTS SEG NFR BLD AUTO: 44 % (ref 43–75)
NONHDLC SERPL-MCNC: 65 MG/DL
NRBC BLD AUTO-RTO: 0 /100 WBCS
PLATELET # BLD AUTO: 165 THOUSANDS/UL (ref 149–390)
PMV BLD AUTO: 11.4 FL (ref 8.9–12.7)
POTASSIUM SERPL-SCNC: 4 MMOL/L (ref 3.5–5.3)
PROT SERPL-MCNC: 8.1 G/DL (ref 6.4–8.2)
RBC # BLD AUTO: 4.85 MILLION/UL (ref 3.88–5.62)
SODIUM SERPL-SCNC: 144 MMOL/L (ref 136–145)
TRIGL SERPL-MCNC: 108 MG/DL
TSH SERPL DL<=0.05 MIU/L-ACNC: 1.21 UIU/ML (ref 0.36–3.74)
WBC # BLD AUTO: 8.61 THOUSAND/UL (ref 4.31–10.16)

## 2020-08-12 PROCEDURE — 83036 HEMOGLOBIN GLYCOSYLATED A1C: CPT

## 2020-08-12 PROCEDURE — 3046F HEMOGLOBIN A1C LEVEL >9.0%: CPT | Performed by: FAMILY MEDICINE

## 2020-08-12 PROCEDURE — 80053 COMPREHEN METABOLIC PANEL: CPT

## 2020-08-12 PROCEDURE — 80061 LIPID PANEL: CPT

## 2020-08-12 PROCEDURE — 85025 COMPLETE CBC W/AUTO DIFF WBC: CPT

## 2020-08-12 PROCEDURE — 36415 COLL VENOUS BLD VENIPUNCTURE: CPT

## 2020-08-12 PROCEDURE — 84443 ASSAY THYROID STIM HORMONE: CPT

## 2020-09-02 ENCOUNTER — OFFICE VISIT (OUTPATIENT)
Dept: FAMILY MEDICINE CLINIC | Facility: CLINIC | Age: 60
End: 2020-09-02

## 2020-09-02 VITALS
BODY MASS INDEX: 32.15 KG/M2 | DIASTOLIC BLOOD PRESSURE: 74 MMHG | WEIGHT: 193 LBS | OXYGEN SATURATION: 98 % | RESPIRATION RATE: 18 BRPM | HEIGHT: 65 IN | TEMPERATURE: 97.6 F | HEART RATE: 72 BPM | SYSTOLIC BLOOD PRESSURE: 126 MMHG

## 2020-09-02 DIAGNOSIS — H01.005 BLEPHARITIS OF LEFT LOWER EYELID, UNSPECIFIED TYPE: ICD-10-CM

## 2020-09-02 DIAGNOSIS — E11.9 TYPE 2 DIABETES MELLITUS WITHOUT COMPLICATION, WITHOUT LONG-TERM CURRENT USE OF INSULIN (HCC): Primary | ICD-10-CM

## 2020-09-02 LAB
CREAT UR-MCNC: 88.7 MG/DL
MICROALBUMIN UR-MCNC: 45 MG/L (ref 0–20)
MICROALBUMIN/CREAT 24H UR: 51 MG/G CREATININE (ref 0–30)

## 2020-09-02 PROCEDURE — 1036F TOBACCO NON-USER: CPT | Performed by: FAMILY MEDICINE

## 2020-09-02 PROCEDURE — 99213 OFFICE O/P EST LOW 20 MIN: CPT | Performed by: FAMILY MEDICINE

## 2020-09-02 PROCEDURE — 82043 UR ALBUMIN QUANTITATIVE: CPT | Performed by: FAMILY MEDICINE

## 2020-09-02 PROCEDURE — 3008F BODY MASS INDEX DOCD: CPT | Performed by: FAMILY MEDICINE

## 2020-09-02 PROCEDURE — 4010F ACE/ARB THERAPY RXD/TAKEN: CPT | Performed by: FAMILY MEDICINE

## 2020-09-02 PROCEDURE — 3060F POS MICROALBUMINURIA REV: CPT | Performed by: FAMILY MEDICINE

## 2020-09-02 PROCEDURE — 82570 ASSAY OF URINE CREATININE: CPT | Performed by: FAMILY MEDICINE

## 2020-09-02 RX ORDER — ERYTHROMYCIN 5 MG/G
0.5 OINTMENT OPHTHALMIC
Qty: 3.5 G | Refills: 0 | Status: SHIPPED | OUTPATIENT
Start: 2020-09-02 | End: 2021-03-22 | Stop reason: ALTCHOICE

## 2020-09-02 RX ORDER — LISINOPRIL 5 MG/1
5 TABLET ORAL DAILY
Qty: 90 TABLET | Refills: 0 | Status: SHIPPED | OUTPATIENT
Start: 2020-09-02 | End: 2020-12-03

## 2020-09-02 NOTE — PATIENT INSTRUCTIONS
- You will receive a phone call regarding the appointment to see the Diabetes educator   - Get labs done one week before next scheduled visit  - Make an appointment to see your diabetes doctor in 3 months please   - Make an appointment to see Dr Melinda Petty  [facility] doctor) at [address], [facility], [address] [place] [address]  [phone]

## 2020-09-03 NOTE — ASSESSMENT & PLAN NOTE
Lab Results   Component Value Date    HGBA1C 9 1 (H) 08/12/2020   - Uncontrolled and upon further discussion patient is only taking metformin and jardiance and not Januvia as instruction by Endocrinology  - Counseled patient at length regarding the importance of nutrition in conjunction with medication to help manage his diabetes and patient is  agreeable to meeting with diabetes education  Referral to diabetes education placed at today's office visit  - Patient given information to schedule an appointment with ophthalmology  - Will order urine microalbumin/creatinine ratio due to history of microalbuminuria and restart patient on lisinopril 5 mg daily

## 2020-09-03 NOTE — ASSESSMENT & PLAN NOTE
- As patient has tried warm compresses with significant improvement will trial patient to erythromycin ointment

## 2020-09-03 NOTE — PROGRESS NOTES
Assessment/Plan:    Type 2 diabetes mellitus without complication, without long-term current use of insulin (Formerly [facility])    Lab Results   Component Value Date    HGBA1C 9 1 (H) 08/12/2020   - Uncontrolled and upon further discussion patient is only taking metformin and jardiance and not Januvia as instruction by Endocrinology  - Counseled patient at length regarding the importance of nutrition in conjunction with medication to help manage his diabetes and patient is  agreeable to meeting with diabetes education  Referral to diabetes education placed at today's office visit  - Patient given information to schedule an appointment with ophthalmology  - Will order urine microalbumin/creatinine ratio due to history of microalbuminuria and restart patient on lisinopril 5 mg daily  Blepharitis of left lower eyelid  - As patient has tried warm compresses with significant improvement will trial patient to erythromycin ointment  Diagnoses and all orders for this visit:    Type 2 diabetes mellitus without complication, without long-term current use of insulin (Formerly [facility])  -     Microalbumin / creatinine urine ratio  -     lisinopril (ZESTRIL) 5 mg tablet; Take 1 tablet (5 mg total) by mouth daily  -     sitaGLIPtin (JANUVIA) 50 mg tablet; Take 1 tablet (50 mg total) by mouth daily  -     Ambulatory referral to Diabetic Education; Future  -     HEMOGLOBIN A1C W/ EAG ESTIMATION; Future  -     Basic metabolic panel; Future  -     erythromycin (ILOTYCIN) ophthalmic ointment; Administer 0 5 inches into the left eye daily at bedtime    Blepharitis of left lower eyelid, unspecified type    Other orders  -     Cancel: UA w Reflex to Microscopic w Reflex to Culture - Clinic Collect  -     Empagliflozin 25 MG TABS; Take 25 mg by mouth every morning          Subjective:      Patient ID: Aleshia Reynoso is a 61 y o  male      HPI     Aleshia Reynoso is a 61year old male with a past medical history of type 2 diabetes mellitus who presents today for a follow up of his diabetes  Due to language barrier, an  was present during the history-taking and subsequent discussion (and for part of the physical exam) with this patient  Currently patient's diabetes in uncontrolled  Patient does report that since the quarantine he has not been exercising as much he usually does and admits to eating a lot of carbohydrates such as rice and noodles  Patient does follow with endocrinology for management of his diabetes and was recently seen by them 2 weeks ago  Prior to being seen by endocrinology patient was reporting burning with urination however states that this has since resolved  Patient also states that he has been experiencing redness of the corner of the eyelids with some discharge in the mornings  Patient states that he was told by his son to do warm compresses which he has been doing for 2-3 weeks but with no real improvement  The following portions of the patient's history were reviewed and updated as appropriate: current medications and problem list     Review of Systems   Constitutional: Negative  HENT: Negative  Eyes: Positive for redness  Respiratory: Negative  Cardiovascular: Negative  Gastrointestinal: Negative  Musculoskeletal: Negative  Skin: Negative  Neurological: Negative  Psychiatric/Behavioral: Negative  Objective:      /74 (BP Location: Left arm, Patient Position: Sitting, Cuff Size: Standard)   Pulse 72   Temp 97 6 °F (36 4 °C) (Temporal)   Resp 18   Ht 5' 5" (1 651 m)   Wt 87 5 kg (193 lb)   SpO2 98%   BMI 32 12 kg/m²          Physical Exam  Constitutional:       General: He is not in acute distress  Appearance: He is well-developed  HENT:      Head: Normocephalic and atraumatic  Eyes:      General:         Right eye: No discharge  Left eye: No discharge  Extraocular Movements: Extraocular movements intact  Pupils: Pupils are equal, round, and reactive to light  Comments: Some redness noted on the left eye, no discharge or signs of infection noted  Neck:      Musculoskeletal: Normal range of motion  Thyroid: No thyromegaly  Cardiovascular:      Rate and Rhythm: Normal rate and regular rhythm  Heart sounds: Normal heart sounds  Pulmonary:      Effort: Pulmonary effort is normal  No respiratory distress  Breath sounds: Normal breath sounds  Abdominal:      Palpations: Abdomen is soft  Tenderness: There is no abdominal tenderness  Musculoskeletal: Normal range of motion  General: No tenderness or deformity  Skin:     General: Skin is warm  Findings: No erythema or rash  Neurological:      General: No focal deficit present  Mental Status: He is alert and oriented to person, place, and time     Psychiatric:         Mood and Affect: Mood normal          Behavior: Behavior normal

## 2020-09-08 ENCOUNTER — TELEPHONE (OUTPATIENT)
Dept: FAMILY MEDICINE CLINIC | Facility: CLINIC | Age: 60
End: 2020-09-08

## 2020-10-19 ENCOUNTER — OFFICE VISIT (OUTPATIENT)
Dept: DIABETES SERVICES | Facility: CLINIC | Age: 60
End: 2020-10-19
Payer: COMMERCIAL

## 2020-10-19 ENCOUNTER — TELEPHONE (OUTPATIENT)
Dept: DIABETES SERVICES | Facility: CLINIC | Age: 60
End: 2020-10-19

## 2020-10-19 DIAGNOSIS — E11.9 TYPE 2 DIABETES MELLITUS WITHOUT COMPLICATION, WITHOUT LONG-TERM CURRENT USE OF INSULIN (HCC): Primary | ICD-10-CM

## 2020-10-19 PROCEDURE — 97802 MEDICAL NUTRITION INDIV IN: CPT | Performed by: DIETITIAN, REGISTERED

## 2020-11-04 ENCOUNTER — OFFICE VISIT (OUTPATIENT)
Dept: URGENT CARE | Age: 60
End: 2020-11-04
Payer: COMMERCIAL

## 2020-11-04 VITALS
TEMPERATURE: 98 F | WEIGHT: 193.4 LBS | BODY MASS INDEX: 33.02 KG/M2 | HEIGHT: 64 IN | OXYGEN SATURATION: 100 % | SYSTOLIC BLOOD PRESSURE: 128 MMHG | DIASTOLIC BLOOD PRESSURE: 69 MMHG | RESPIRATION RATE: 20 BRPM | HEART RATE: 83 BPM

## 2020-11-04 DIAGNOSIS — B35.4 TINEA CORPORIS: Primary | ICD-10-CM

## 2020-11-04 PROCEDURE — 99213 OFFICE O/P EST LOW 20 MIN: CPT | Performed by: PHYSICIAN ASSISTANT

## 2020-11-04 RX ORDER — CLOTRIMAZOLE AND BETAMETHASONE DIPROPIONATE 10; .64 MG/G; MG/G
CREAM TOPICAL 2 TIMES DAILY
Qty: 30 G | Refills: 0 | Status: SHIPPED | OUTPATIENT
Start: 2020-11-04 | End: 2020-12-21 | Stop reason: ALTCHOICE

## 2020-11-10 ENCOUNTER — LAB (OUTPATIENT)
Dept: LAB | Age: 60
End: 2020-11-10
Payer: COMMERCIAL

## 2020-11-10 ENCOUNTER — TRANSCRIBE ORDERS (OUTPATIENT)
Dept: URGENT CARE | Age: 60
End: 2020-11-10

## 2020-11-10 DIAGNOSIS — E11.65 UNCONTROLLED TYPE 2 DIABETES MELLITUS WITH HYPERGLYCEMIA (HCC): ICD-10-CM

## 2020-11-10 DIAGNOSIS — R79.89 ABNORMAL THYROID STIMULATING HORMONE (TSH) LEVEL: ICD-10-CM

## 2020-11-10 DIAGNOSIS — R79.89 ABNORMAL THYROID STIMULATING HORMONE (TSH) LEVEL: Primary | ICD-10-CM

## 2020-11-10 LAB
ALBUMIN SERPL BCP-MCNC: 3.9 G/DL (ref 3.5–5)
ALP SERPL-CCNC: 50 U/L (ref 46–116)
ALT SERPL W P-5'-P-CCNC: 25 U/L (ref 12–78)
ANION GAP SERPL CALCULATED.3IONS-SCNC: 4 MMOL/L (ref 4–13)
AST SERPL W P-5'-P-CCNC: 14 U/L (ref 5–45)
BASOPHILS # BLD AUTO: 0.05 THOUSANDS/ΜL (ref 0–0.1)
BASOPHILS NFR BLD AUTO: 1 % (ref 0–1)
BILIRUB SERPL-MCNC: 0.45 MG/DL (ref 0.2–1)
BUN SERPL-MCNC: 13 MG/DL (ref 5–25)
CALCIUM SERPL-MCNC: 9.2 MG/DL (ref 8.3–10.1)
CHLORIDE SERPL-SCNC: 111 MMOL/L (ref 100–108)
CO2 SERPL-SCNC: 28 MMOL/L (ref 21–32)
CREAT SERPL-MCNC: 0.9 MG/DL (ref 0.6–1.3)
EOSINOPHIL # BLD AUTO: 0.18 THOUSAND/ΜL (ref 0–0.61)
EOSINOPHIL NFR BLD AUTO: 2 % (ref 0–6)
ERYTHROCYTE [DISTWIDTH] IN BLOOD BY AUTOMATED COUNT: 13 % (ref 11.6–15.1)
EST. AVERAGE GLUCOSE BLD GHB EST-MCNC: 131 MG/DL
GFR SERPL CREATININE-BSD FRML MDRD: 93 ML/MIN/1.73SQ M
GLUCOSE P FAST SERPL-MCNC: 74 MG/DL (ref 65–99)
HBA1C MFR BLD: 6.2 %
HCT VFR BLD AUTO: 46.5 % (ref 36.5–49.3)
HGB BLD-MCNC: 14.9 G/DL (ref 12–17)
IMM GRANULOCYTES # BLD AUTO: 0.02 THOUSAND/UL (ref 0–0.2)
IMM GRANULOCYTES NFR BLD AUTO: 0 % (ref 0–2)
LYMPHOCYTES # BLD AUTO: 3.99 THOUSANDS/ΜL (ref 0.6–4.47)
LYMPHOCYTES NFR BLD AUTO: 48 % (ref 14–44)
MCH RBC QN AUTO: 29.2 PG (ref 26.8–34.3)
MCHC RBC AUTO-ENTMCNC: 32 G/DL (ref 31.4–37.4)
MCV RBC AUTO: 91 FL (ref 82–98)
MONOCYTES # BLD AUTO: 0.26 THOUSAND/ΜL (ref 0.17–1.22)
MONOCYTES NFR BLD AUTO: 3 % (ref 4–12)
NEUTROPHILS # BLD AUTO: 3.8 THOUSANDS/ΜL (ref 1.85–7.62)
NEUTS SEG NFR BLD AUTO: 46 % (ref 43–75)
NRBC BLD AUTO-RTO: 0 /100 WBCS
PLATELET # BLD AUTO: 166 THOUSANDS/UL (ref 149–390)
PMV BLD AUTO: 11.8 FL (ref 8.9–12.7)
POTASSIUM SERPL-SCNC: 4.1 MMOL/L (ref 3.5–5.3)
PROT SERPL-MCNC: 8.4 G/DL (ref 6.4–8.2)
RBC # BLD AUTO: 5.1 MILLION/UL (ref 3.88–5.62)
SODIUM SERPL-SCNC: 143 MMOL/L (ref 136–145)
TSH SERPL DL<=0.05 MIU/L-ACNC: 0.21 UIU/ML (ref 0.36–3.74)
WBC # BLD AUTO: 8.3 THOUSAND/UL (ref 4.31–10.16)

## 2020-11-10 PROCEDURE — 83036 HEMOGLOBIN GLYCOSYLATED A1C: CPT

## 2020-11-10 PROCEDURE — 84443 ASSAY THYROID STIM HORMONE: CPT

## 2020-11-10 PROCEDURE — 85025 COMPLETE CBC W/AUTO DIFF WBC: CPT

## 2020-11-10 PROCEDURE — 80053 COMPREHEN METABOLIC PANEL: CPT

## 2020-11-10 PROCEDURE — 36415 COLL VENOUS BLD VENIPUNCTURE: CPT

## 2020-12-02 DIAGNOSIS — E11.9 TYPE 2 DIABETES MELLITUS WITHOUT COMPLICATION, WITHOUT LONG-TERM CURRENT USE OF INSULIN (HCC): ICD-10-CM

## 2020-12-03 RX ORDER — LISINOPRIL 5 MG/1
TABLET ORAL
Qty: 90 TABLET | Refills: 0 | Status: SHIPPED | OUTPATIENT
Start: 2020-12-03 | End: 2021-03-02

## 2020-12-14 ENCOUNTER — LAB (OUTPATIENT)
Dept: LAB | Age: 60
End: 2020-12-14
Payer: COMMERCIAL

## 2020-12-14 DIAGNOSIS — E11.9 TYPE 2 DIABETES MELLITUS WITHOUT COMPLICATION, WITHOUT LONG-TERM CURRENT USE OF INSULIN (HCC): ICD-10-CM

## 2020-12-14 LAB
ANION GAP SERPL CALCULATED.3IONS-SCNC: 5 MMOL/L (ref 4–13)
BUN SERPL-MCNC: 14 MG/DL (ref 5–25)
CALCIUM SERPL-MCNC: 9.7 MG/DL (ref 8.3–10.1)
CHLORIDE SERPL-SCNC: 110 MMOL/L (ref 100–108)
CO2 SERPL-SCNC: 27 MMOL/L (ref 21–32)
CREAT SERPL-MCNC: 0.94 MG/DL (ref 0.6–1.3)
EST. AVERAGE GLUCOSE BLD GHB EST-MCNC: 123 MG/DL
GFR SERPL CREATININE-BSD FRML MDRD: 88 ML/MIN/1.73SQ M
GLUCOSE P FAST SERPL-MCNC: 89 MG/DL (ref 65–99)
HBA1C MFR BLD: 5.9 %
POTASSIUM SERPL-SCNC: 4.6 MMOL/L (ref 3.5–5.3)
SODIUM SERPL-SCNC: 142 MMOL/L (ref 136–145)

## 2020-12-14 PROCEDURE — 83036 HEMOGLOBIN GLYCOSYLATED A1C: CPT

## 2020-12-14 PROCEDURE — 80048 BASIC METABOLIC PNL TOTAL CA: CPT

## 2020-12-14 PROCEDURE — 36415 COLL VENOUS BLD VENIPUNCTURE: CPT

## 2020-12-18 RX ORDER — TIZANIDINE 4 MG/1
4 TABLET ORAL EVERY 8 HOURS PRN
COMMUNITY
End: 2021-03-23 | Stop reason: ALTCHOICE

## 2020-12-18 RX ORDER — MELOXICAM 7.5 MG/1
TABLET ORAL
COMMUNITY
End: 2021-06-24 | Stop reason: ALTCHOICE

## 2020-12-21 ENCOUNTER — OFFICE VISIT (OUTPATIENT)
Dept: FAMILY MEDICINE CLINIC | Facility: CLINIC | Age: 60
End: 2020-12-21

## 2020-12-21 VITALS
SYSTOLIC BLOOD PRESSURE: 102 MMHG | DIASTOLIC BLOOD PRESSURE: 64 MMHG | RESPIRATION RATE: 18 BRPM | OXYGEN SATURATION: 100 % | TEMPERATURE: 97.8 F | HEART RATE: 88 BPM | HEIGHT: 64 IN | BODY MASS INDEX: 31.92 KG/M2 | WEIGHT: 187 LBS

## 2020-12-21 DIAGNOSIS — E11.9 TYPE 2 DIABETES MELLITUS WITHOUT COMPLICATION, WITHOUT LONG-TERM CURRENT USE OF INSULIN (HCC): Primary | ICD-10-CM

## 2020-12-21 DIAGNOSIS — Z23 NEED FOR VACCINATION: ICD-10-CM

## 2020-12-21 PROBLEM — R07.82 INTERCOSTAL PAIN: Status: RESOLVED | Noted: 2020-01-27 | Resolved: 2020-12-21

## 2020-12-21 PROBLEM — L03.211 CELLULITIS OF FACE: Status: RESOLVED | Noted: 2019-10-08 | Resolved: 2020-12-21

## 2020-12-21 PROCEDURE — 99213 OFFICE O/P EST LOW 20 MIN: CPT | Performed by: FAMILY MEDICINE

## 2020-12-21 PROCEDURE — 3008F BODY MASS INDEX DOCD: CPT | Performed by: FAMILY MEDICINE

## 2020-12-21 PROCEDURE — 90682 RIV4 VACC RECOMBINANT DNA IM: CPT | Performed by: FAMILY MEDICINE

## 2020-12-21 PROCEDURE — 90471 IMMUNIZATION ADMIN: CPT | Performed by: FAMILY MEDICINE

## 2020-12-21 PROCEDURE — 1036F TOBACCO NON-USER: CPT | Performed by: FAMILY MEDICINE

## 2021-01-27 DIAGNOSIS — E78.2 MIXED HYPERLIPIDEMIA: ICD-10-CM

## 2021-01-27 RX ORDER — ATORVASTATIN CALCIUM 40 MG/1
TABLET, FILM COATED ORAL
Qty: 90 TABLET | Refills: 3 | Status: SHIPPED | OUTPATIENT
Start: 2021-01-27 | End: 2022-02-11 | Stop reason: SDUPTHER

## 2021-02-10 ENCOUNTER — LAB (OUTPATIENT)
Dept: LAB | Age: 61
End: 2021-02-10
Payer: COMMERCIAL

## 2021-02-10 ENCOUNTER — TRANSCRIBE ORDERS (OUTPATIENT)
Dept: URGENT CARE | Age: 61
End: 2021-02-10

## 2021-02-10 DIAGNOSIS — R80.9 TYPE 2 DIABETES MELLITUS WITH MICROALBUMINURIA, WITHOUT LONG-TERM CURRENT USE OF INSULIN (HCC): Primary | ICD-10-CM

## 2021-02-10 DIAGNOSIS — E11.29 TYPE 2 DIABETES MELLITUS WITH MICROALBUMINURIA, WITHOUT LONG-TERM CURRENT USE OF INSULIN (HCC): Primary | ICD-10-CM

## 2021-02-10 DIAGNOSIS — R80.9 TYPE 2 DIABETES MELLITUS WITH MICROALBUMINURIA, WITHOUT LONG-TERM CURRENT USE OF INSULIN (HCC): ICD-10-CM

## 2021-02-10 DIAGNOSIS — E11.29 TYPE 2 DIABETES MELLITUS WITH MICROALBUMINURIA, WITHOUT LONG-TERM CURRENT USE OF INSULIN (HCC): ICD-10-CM

## 2021-02-10 LAB
ALBUMIN SERPL BCP-MCNC: 4 G/DL (ref 3.5–5)
ALP SERPL-CCNC: 54 U/L (ref 46–116)
ALT SERPL W P-5'-P-CCNC: 30 U/L (ref 12–78)
ANION GAP SERPL CALCULATED.3IONS-SCNC: 3 MMOL/L (ref 4–13)
AST SERPL W P-5'-P-CCNC: 16 U/L (ref 5–45)
BASOPHILS # BLD AUTO: 0.05 THOUSANDS/ΜL (ref 0–0.1)
BASOPHILS NFR BLD AUTO: 1 % (ref 0–1)
BILIRUB SERPL-MCNC: 0.59 MG/DL (ref 0.2–1)
BUN SERPL-MCNC: 16 MG/DL (ref 5–25)
CALCIUM SERPL-MCNC: 9.2 MG/DL (ref 8.3–10.1)
CHLORIDE SERPL-SCNC: 107 MMOL/L (ref 100–108)
CHOLEST SERPL-MCNC: 112 MG/DL (ref 50–200)
CO2 SERPL-SCNC: 30 MMOL/L (ref 21–32)
CREAT SERPL-MCNC: 1.09 MG/DL (ref 0.6–1.3)
CREAT UR-MCNC: 90.8 MG/DL
EOSINOPHIL # BLD AUTO: 0.2 THOUSAND/ΜL (ref 0–0.61)
EOSINOPHIL NFR BLD AUTO: 2 % (ref 0–6)
ERYTHROCYTE [DISTWIDTH] IN BLOOD BY AUTOMATED COUNT: 13.3 % (ref 11.6–15.1)
EST. AVERAGE GLUCOSE BLD GHB EST-MCNC: 128 MG/DL
GFR SERPL CREATININE-BSD FRML MDRD: 73 ML/MIN/1.73SQ M
GLUCOSE P FAST SERPL-MCNC: 92 MG/DL (ref 65–99)
HBA1C MFR BLD: 6.1 %
HCT VFR BLD AUTO: 48.2 % (ref 36.5–49.3)
HDLC SERPL-MCNC: 34 MG/DL
HGB BLD-MCNC: 15.5 G/DL (ref 12–17)
IMM GRANULOCYTES # BLD AUTO: 0.02 THOUSAND/UL (ref 0–0.2)
IMM GRANULOCYTES NFR BLD AUTO: 0 % (ref 0–2)
LDLC SERPL CALC-MCNC: 60 MG/DL (ref 0–100)
LYMPHOCYTES # BLD AUTO: 4.49 THOUSANDS/ΜL (ref 0.6–4.47)
LYMPHOCYTES NFR BLD AUTO: 49 % (ref 14–44)
MCH RBC QN AUTO: 29.4 PG (ref 26.8–34.3)
MCHC RBC AUTO-ENTMCNC: 32.2 G/DL (ref 31.4–37.4)
MCV RBC AUTO: 92 FL (ref 82–98)
MICROALBUMIN UR-MCNC: 16.7 MG/L (ref 0–20)
MICROALBUMIN/CREAT 24H UR: 18 MG/G CREATININE (ref 0–30)
MONOCYTES # BLD AUTO: 0.31 THOUSAND/ΜL (ref 0.17–1.22)
MONOCYTES NFR BLD AUTO: 3 % (ref 4–12)
NEUTROPHILS # BLD AUTO: 4.15 THOUSANDS/ΜL (ref 1.85–7.62)
NEUTS SEG NFR BLD AUTO: 45 % (ref 43–75)
NONHDLC SERPL-MCNC: 78 MG/DL
NRBC BLD AUTO-RTO: 0 /100 WBCS
PLATELET # BLD AUTO: 165 THOUSANDS/UL (ref 149–390)
PMV BLD AUTO: 10.8 FL (ref 8.9–12.7)
POTASSIUM SERPL-SCNC: 4.2 MMOL/L (ref 3.5–5.3)
PROT SERPL-MCNC: 8.5 G/DL (ref 6.4–8.2)
RBC # BLD AUTO: 5.27 MILLION/UL (ref 3.88–5.62)
SODIUM SERPL-SCNC: 140 MMOL/L (ref 136–145)
TRIGL SERPL-MCNC: 88 MG/DL
WBC # BLD AUTO: 9.22 THOUSAND/UL (ref 4.31–10.16)

## 2021-02-10 PROCEDURE — 85025 COMPLETE CBC W/AUTO DIFF WBC: CPT

## 2021-02-10 PROCEDURE — 80061 LIPID PANEL: CPT

## 2021-02-10 PROCEDURE — 82043 UR ALBUMIN QUANTITATIVE: CPT | Performed by: INTERNAL MEDICINE

## 2021-02-10 PROCEDURE — 82570 ASSAY OF URINE CREATININE: CPT | Performed by: INTERNAL MEDICINE

## 2021-02-10 PROCEDURE — 3061F NEG MICROALBUMINURIA REV: CPT | Performed by: FAMILY MEDICINE

## 2021-02-10 PROCEDURE — 3066F NEPHROPATHY DOC TX: CPT | Performed by: FAMILY MEDICINE

## 2021-02-10 PROCEDURE — 36415 COLL VENOUS BLD VENIPUNCTURE: CPT

## 2021-02-10 PROCEDURE — 83036 HEMOGLOBIN GLYCOSYLATED A1C: CPT

## 2021-02-10 PROCEDURE — 80053 COMPREHEN METABOLIC PANEL: CPT

## 2021-02-10 PROCEDURE — 3044F HG A1C LEVEL LT 7.0%: CPT | Performed by: FAMILY MEDICINE

## 2021-03-02 DIAGNOSIS — E11.9 TYPE 2 DIABETES MELLITUS WITHOUT COMPLICATION, WITHOUT LONG-TERM CURRENT USE OF INSULIN (HCC): ICD-10-CM

## 2021-03-02 PROCEDURE — 4010F ACE/ARB THERAPY RXD/TAKEN: CPT | Performed by: FAMILY MEDICINE

## 2021-03-02 RX ORDER — LISINOPRIL 5 MG/1
TABLET ORAL
Qty: 90 TABLET | Refills: 0 | Status: SHIPPED | OUTPATIENT
Start: 2021-03-02 | End: 2021-06-02

## 2021-03-22 ENCOUNTER — OFFICE VISIT (OUTPATIENT)
Dept: FAMILY MEDICINE CLINIC | Facility: CLINIC | Age: 61
End: 2021-03-22

## 2021-03-22 VITALS
OXYGEN SATURATION: 98 % | HEART RATE: 83 BPM | DIASTOLIC BLOOD PRESSURE: 60 MMHG | BODY MASS INDEX: 32.27 KG/M2 | TEMPERATURE: 98 F | SYSTOLIC BLOOD PRESSURE: 100 MMHG | WEIGHT: 188 LBS | RESPIRATION RATE: 16 BRPM

## 2021-03-22 DIAGNOSIS — L91.8 SKIN TAGS, MULTIPLE ACQUIRED: ICD-10-CM

## 2021-03-22 DIAGNOSIS — R21 RASH: ICD-10-CM

## 2021-03-22 DIAGNOSIS — G89.29 CHRONIC DENTAL PAIN: ICD-10-CM

## 2021-03-22 DIAGNOSIS — L50.5 CHOLINERGIC URTICARIA: Primary | ICD-10-CM

## 2021-03-22 DIAGNOSIS — K08.9 CHRONIC DENTAL PAIN: ICD-10-CM

## 2021-03-22 PROCEDURE — 3725F SCREEN DEPRESSION PERFORMED: CPT | Performed by: FAMILY MEDICINE

## 2021-03-22 PROCEDURE — 1036F TOBACCO NON-USER: CPT | Performed by: FAMILY MEDICINE

## 2021-03-22 PROCEDURE — 99213 OFFICE O/P EST LOW 20 MIN: CPT | Performed by: FAMILY MEDICINE

## 2021-03-22 RX ORDER — LANCETS 33 GAUGE
EACH MISCELLANEOUS
COMMUNITY
End: 2022-02-11

## 2021-03-22 RX ORDER — CLOTRIMAZOLE AND BETAMETHASONE DIPROPIONATE 10; .64 MG/G; MG/G
CREAM TOPICAL 2 TIMES DAILY
Qty: 30 G | Refills: 0 | Status: SHIPPED | OUTPATIENT
Start: 2021-03-22 | End: 2021-06-24 | Stop reason: ALTCHOICE

## 2021-03-22 RX ORDER — LORATADINE 10 MG/1
10 TABLET ORAL DAILY
Qty: 90 TABLET | Refills: 0 | Status: SHIPPED | OUTPATIENT
Start: 2021-03-22 | End: 2021-06-21

## 2021-03-23 PROBLEM — M54.50 ACUTE BILATERAL LOW BACK PAIN WITHOUT SCIATICA: Status: RESOLVED | Noted: 2020-01-27 | Resolved: 2021-03-23

## 2021-03-23 PROBLEM — Z23 NEED FOR VACCINATION: Status: RESOLVED | Noted: 2020-12-21 | Resolved: 2021-03-23

## 2021-03-23 PROBLEM — H01.005 BLEPHARITIS OF LEFT LOWER EYELID: Status: RESOLVED | Noted: 2020-09-02 | Resolved: 2021-03-23

## 2021-03-23 PROBLEM — E05.90 SUBCLINICAL THYROTOXICOSIS: Status: ACTIVE | Noted: 2021-03-23

## 2021-03-23 PROBLEM — L50.5 CHOLINERGIC URTICARIA: Status: ACTIVE | Noted: 2021-03-23

## 2021-03-23 NOTE — PROGRESS NOTES
Assessment/Plan:    Cholinergic urticaria  - Will trial patient on Claritin 10 mg daily which can be increased to twice a day if no improvement  Skin tags, multiple acquired  - Patient to return for skin tag removal as they are becoming bothersome to him        Diagnoses and all orders for this visit:    Cholinergic urticaria    Rash  -     loratadine (CLARITIN) 10 mg tablet; Take 1 tablet (10 mg total) by mouth daily  -     clotrimazole-betamethasone (LOTRISONE) 1-0 05 % cream; Apply topically 2 (two) times a day    Skin tags, multiple acquired    Chronic dental pain  -     Ambulatory referral to Dentistry; Future    Other orders  -     Discontinue: metFORMIN (GLUCOPHAGE) 1000 MG tablet; Take 1,000 mg by mouth  -     Multiple Vitamins-Minerals (CENTRUM SILVER PO); Take 1 tablet by mouth daily  -     OneTouch Delica Lancets 91D MISC; Use  -     Multiple Vitamins-Minerals (OCUVITE-LUTEIN PO); Take 1 each by mouth daily          Subjective:      Patient ID: Tiffanie Hoffmann is a 61 y o  male   # [id number] utilized as patient speaks Mandarin      HPI     Tiffanie Hoffmann is a 61year old male with a past medical history of type 2 diabetes mellitus who presents today for a follow up  Today patient presents with multiple complaints  Firstly patient states that since 2018 whenever he eats something hot or sweats excessively he breaks out into a pruritic rash which spontaneously resolves  Patient states that he was previously going to the gym however has stopped going because of this issue  Secondly, patient states that he has multiple skin tags, which are now becoming bothersome to him and would therefore like them to be removed  Thirdly patient would also like a referral to a dentist as he has been having dental pain  Fourthly patient would also like a refills of Lotrisone cream which was given to him by a previous physician for a fungal rash on his back, groin and legs which has reoccurred      Review of Systems Constitutional: Negative  HENT: Negative  Eyes: Negative  Respiratory: Negative  Cardiovascular: Negative  Gastrointestinal: Negative  Musculoskeletal: Negative  Skin: Positive for rash  Neurological: Negative  Psychiatric/Behavioral: Negative  Objective:      /60 (BP Location: Right arm, Patient Position: Sitting, Cuff Size: Standard)   Pulse 83   Temp 98 °F (36 7 °C)   Resp 16   Wt 85 3 kg (188 lb)   SpO2 98%   BMI 32 27 kg/m²          Physical Exam  Constitutional:       General: He is not in acute distress  Appearance: Normal appearance  He is obese  HENT:      Head: Normocephalic and atraumatic  Eyes:      General:         Right eye: No discharge  Left eye: No discharge  Extraocular Movements: Extraocular movements intact  Neck:      Musculoskeletal: Normal range of motion  Cardiovascular:      Rate and Rhythm: Normal rate  Pulses: Normal pulses  Pulmonary:      Effort: Pulmonary effort is normal  No respiratory distress  Skin:     Findings: Rash present  Comments: Annular erythematous rash located on the back  with active margin and central clearing    Neurological:      General: No focal deficit present  Mental Status: He is alert and oriented to person, place, and time     Psychiatric:         Mood and Affect: Mood normal          Behavior: Behavior normal

## 2021-03-23 NOTE — ASSESSMENT & PLAN NOTE
- Will trial patient on Claritin 10 mg daily which can be increased to twice a day if no improvement

## 2021-04-16 DIAGNOSIS — E11.9 TYPE 2 DIABETES MELLITUS WITHOUT COMPLICATION, WITHOUT LONG-TERM CURRENT USE OF INSULIN (HCC): ICD-10-CM

## 2021-04-19 ENCOUNTER — PROCEDURE VISIT (OUTPATIENT)
Dept: FAMILY MEDICINE CLINIC | Facility: CLINIC | Age: 61
End: 2021-04-19

## 2021-04-19 VITALS
SYSTOLIC BLOOD PRESSURE: 122 MMHG | HEIGHT: 64 IN | OXYGEN SATURATION: 98 % | HEART RATE: 84 BPM | RESPIRATION RATE: 18 BRPM | TEMPERATURE: 97.5 F | BODY MASS INDEX: 33.12 KG/M2 | WEIGHT: 194 LBS | DIASTOLIC BLOOD PRESSURE: 74 MMHG

## 2021-04-19 DIAGNOSIS — E11.9 ENCOUNTER FOR DIABETES TYPE 2 EYE EXAM (HCC): ICD-10-CM

## 2021-04-19 DIAGNOSIS — L91.8 SKIN TAGS, MULTIPLE ACQUIRED: Primary | ICD-10-CM

## 2021-04-19 DIAGNOSIS — Z01.00 ENCOUNTER FOR DIABETES TYPE 2 EYE EXAM (HCC): ICD-10-CM

## 2021-04-19 LAB
LEFT EYE DIABETIC RETINOPATHY: ABNORMAL
LEFT EYE IMAGE QUALITY: ABNORMAL
LEFT EYE MACULAR EDEMA: ABNORMAL
LEFT EYE OTHER RETINOPATHY: ABNORMAL
RIGHT EYE DIABETIC RETINOPATHY: ABNORMAL
RIGHT EYE IMAGE QUALITY: ABNORMAL
RIGHT EYE MACULAR EDEMA: ABNORMAL
RIGHT EYE OTHER RETINOPATHY: ABNORMAL
SEVERITY (EYE EXAM): ABNORMAL

## 2021-04-19 PROCEDURE — 3008F BODY MASS INDEX DOCD: CPT | Performed by: FAMILY MEDICINE

## 2021-04-19 PROCEDURE — 11200 RMVL SKIN TAGS UP TO&INC 15: CPT | Performed by: FAMILY MEDICINE

## 2021-04-19 PROCEDURE — 92250 FUNDUS PHOTOGRAPHY W/I&R: CPT | Performed by: FAMILY MEDICINE

## 2021-04-19 PROCEDURE — 2024F 7 FLD RTA PHOTO EVC RTNOPTHY: CPT | Performed by: FAMILY MEDICINE

## 2021-04-19 NOTE — PROGRESS NOTES
Assessment/Plan:    Skin tags, multiple acquired  Skin tags removed with a mixture of cryotherapy and electrodesiccation  These pathognomonic lesions are not sent for pathology  Patient tolerated the procedure well with no immediate complaints  Diagnoses and all orders for this visit:    Skin tags, multiple acquired    Encounter for diabetes type 2 eye exam ([facility] [address] )  -     IRIS Diabetic eye exam    Other orders  -     Skin tag removal          Subjective:      Patient ID: Cal Armstrong is a 61 y o  male  HPI     Cal Armstrong is a 61year old male who presents today for skin tag removal  Patient has several skin tags located on the neck, face, armpits and groin  [employer] utilized for today's office visit as patient primarily speaks Mandarin  Review of Systems   Constitutional: Negative  HENT: Negative  Eyes: Negative  Respiratory: Negative  Cardiovascular: Negative  Gastrointestinal: Negative  Musculoskeletal: Negative  Skin: Negative  Neurological: Negative  Psychiatric/Behavioral: Negative  Objective:      /74 (BP Location: Left arm, Patient Position: Sitting, Cuff Size: Standard)   Pulse 84   Temp 97 5 °F (36 4 °C) (Temporal)   Resp 18   Ht 5' 4" (1 626 m)   Wt 88 kg (194 lb)   SpO2 98%   BMI 33 30 kg/m²          Physical Exam  Constitutional:       General: He is not in acute distress  Appearance: Normal appearance  HENT:      Head: Normocephalic and atraumatic  Eyes:      General:         Right eye: No discharge  Left eye: No discharge  Extraocular Movements: Extraocular movements intact  Neck:      Musculoskeletal: Normal range of motion  Pulmonary:      Effort: No respiratory distress  Neurological:      General: No focal deficit present  Mental Status: He is alert and oriented to person, place, and time     Psychiatric:         Mood and Affect: Mood normal          Behavior: Behavior normal        Skin tag removal    Date/Time: 4/19/2021 1:00 PM  Performed by: Hallie Lin MD  Authorized by: Hallie Lin MD   Universal Protocol:  Consent: Verbal consent obtained  Risks and benefits: risks, benefits and alternatives were discussed  Consent given by: patient  Patient understanding: patient states understanding of the procedure being performed  Patient identity confirmed: verbally with patient        Procedure Details - Skin Tag Destruction:     Up to 15      Body area:  Head/neck    Head/neck location:  L cheek    Malignancy: benign lesion      Destruction method: cryotherapy    Lesion 2:     Body area:  Head/neck    Head/neck location:  Forehead    Malignancy: benign lesion      Destruction method: cryotherapy    Lesion 3:     Body area:  [address]    Head/neck location:  Neck    Malignancy: benign lesion      Destruction method: cryotherapy    Lesion 4:     Body area:  Head/neck    Head/neck location:  Neck    Malignancy: benign lesion      Destruction method: cryotherapy    Lesion 5:     Body area:  Head/neck    Head/neck location:  Neck    Malignancy: benign lesion      Destruction method: cryotherapy    Lesion 6:     Body area:  [address]    Head/neck location:  Neck    Malignancy: benign lesion      Destruction method: cryotherapy      * Other skin tags where located in the groin and armpits and were removed using a combination of cryotherapy and electrodesiccation

## 2021-04-19 NOTE — ASSESSMENT & PLAN NOTE
Skin tags removed with a mixture of cryotherapy and electrodesiccation  These pathognomonic lesions are not sent for pathology  Patient tolerated the procedure well with no immediate complaints

## 2021-04-20 RX ORDER — EMPAGLIFLOZIN 25 MG/1
TABLET, FILM COATED ORAL
Qty: 90 TABLET | Refills: 1 | Status: SHIPPED | OUTPATIENT
Start: 2021-04-20 | End: 2021-11-12 | Stop reason: SDUPTHER

## 2021-04-22 ENCOUNTER — TELEPHONE (OUTPATIENT)
Dept: FAMILY MEDICINE CLINIC | Facility: CLINIC | Age: 61
End: 2021-04-22

## 2021-04-23 NOTE — TELEPHONE ENCOUNTER
Dixie Du, can you call patient and ask him to inform his endocrinologist of this  She is the one who is managing his diabetes   Thank you

## 2021-06-01 DIAGNOSIS — E11.9 TYPE 2 DIABETES MELLITUS WITHOUT COMPLICATION, WITHOUT LONG-TERM CURRENT USE OF INSULIN (HCC): ICD-10-CM

## 2021-06-02 RX ORDER — LISINOPRIL 5 MG/1
TABLET ORAL
Qty: 90 TABLET | Refills: 0 | Status: SHIPPED | OUTPATIENT
Start: 2021-06-02 | End: 2021-09-09

## 2021-06-18 DIAGNOSIS — R21 RASH: ICD-10-CM

## 2021-06-21 RX ORDER — LORATADINE 10 MG/1
TABLET ORAL
Qty: 90 TABLET | Refills: 0 | Status: SHIPPED | OUTPATIENT
Start: 2021-06-21 | End: 2021-10-04 | Stop reason: SDUPTHER

## 2021-06-24 ENCOUNTER — OFFICE VISIT (OUTPATIENT)
Dept: FAMILY MEDICINE CLINIC | Facility: CLINIC | Age: 61
End: 2021-06-24

## 2021-06-24 VITALS
RESPIRATION RATE: 16 BRPM | TEMPERATURE: 97 F | BODY MASS INDEX: 33.46 KG/M2 | OXYGEN SATURATION: 97 % | DIASTOLIC BLOOD PRESSURE: 62 MMHG | WEIGHT: 196 LBS | HEART RATE: 81 BPM | HEIGHT: 64 IN | SYSTOLIC BLOOD PRESSURE: 98 MMHG

## 2021-06-24 DIAGNOSIS — R42 DIZZINESS: ICD-10-CM

## 2021-06-24 DIAGNOSIS — Z23 NEED FOR VACCINATION: ICD-10-CM

## 2021-06-24 DIAGNOSIS — E66.09 CLASS 1 OBESITY DUE TO EXCESS CALORIES WITH SERIOUS COMORBIDITY AND BODY MASS INDEX (BMI) OF 33.0 TO 33.9 IN ADULT: ICD-10-CM

## 2021-06-24 DIAGNOSIS — E11.9 TYPE 2 DIABETES MELLITUS WITHOUT COMPLICATION, WITHOUT LONG-TERM CURRENT USE OF INSULIN (HCC): Primary | ICD-10-CM

## 2021-06-24 DIAGNOSIS — Z12.11 ENCOUNTER FOR SCREENING COLONOSCOPY: ICD-10-CM

## 2021-06-24 PROBLEM — E66.811 CLASS 1 OBESITY DUE TO EXCESS CALORIES WITH SERIOUS COMORBIDITY AND BODY MASS INDEX (BMI) OF 33.0 TO 33.9 IN ADULT: Status: ACTIVE | Noted: 2021-06-24

## 2021-06-24 LAB — SL AMB POCT HEMOGLOBIN AIC: 7.1 (ref ?–6.5)

## 2021-06-24 PROCEDURE — 99213 OFFICE O/P EST LOW 20 MIN: CPT | Performed by: INTERNAL MEDICINE

## 2021-06-24 PROCEDURE — 90471 IMMUNIZATION ADMIN: CPT

## 2021-06-24 PROCEDURE — 83036 HEMOGLOBIN GLYCOSYLATED A1C: CPT | Performed by: INTERNAL MEDICINE

## 2021-06-24 PROCEDURE — 90732 PPSV23 VACC 2 YRS+ SUBQ/IM: CPT

## 2021-06-24 RX ORDER — MECLIZINE HCL 12.5 MG/1
12.5 TABLET ORAL EVERY 12 HOURS PRN
Qty: 30 TABLET | Refills: 0 | Status: SHIPPED | OUTPATIENT
Start: 2021-06-24 | End: 2021-09-27 | Stop reason: SDUPTHER

## 2021-06-24 NOTE — PATIENT INSTRUCTIONS
You are meant to be on Jardiance 25 mg for your diabetes but it is no longer covered by your insurance  Please discuss with your endocrinologist about any alternative medications       ?????????   ??????   ????????? ????????? (?) ?????????????????????????????????????????????????????????????????????????????  ??????????????  · ????????????     ? ????? ????????????????????????????????????????????????????????????????????????????    ? Omega-3 ?? ???????????????????????????????????? omega-3 ??????       · ???? 20 ? 30 ???? ????????????? (?????) ?????????         · ?????????????     ? ??? ?????????????????????????????????????????? 200 mg?    ? ???? ???????????????????????????????????????????????????????? 7%?    ? ???? ??????????????????????????????????????????????????????    · ??????????? ????????????????? 2,000 ? 2,300 mg???????????????????????????????????????????       ????????????????? ??????????????????????  · ???     ? ??????????????    ? ????????????    · ???     ? ?????????????    ? ????????????    ? ????????????    ? ????????    · ???     ? ??????????    ? ?????????    ? ?????????????????    ? ??????????    ? ??????    ? ????????    · ??????     ? ?? (??) ???1% ???????????????    ? ????????????????????    · ???????     ? ???????? (??????)?????????    ? ?????????????    ??????????????????????  · ??????????     ? ??? 2% ??????????????    ? ?????? (???????)?????????????? (????)    ? ????????????????????????????    · ??????????     ? ???? (?????)???????????????????? 300 mg ???    ? ??????????????????????????    ? ????????????????    ? ????????????????????????????    · ???????????     ? ???????????????    ? ???? (??)????????    ? ?????????????????    ?????????  · ????? ????????????????????????????????????????????????????????????????????????????????????????????    · ???????????? ??????????????????????????????????????????????? 21 ???????? 65 ???????? 1 ??????????? 3 ?????????? 7 ???? 21 ? 64 ????????????? 2 ??????????? 4 ?????????? 14 ?????? 12 ?????5 ??????? 1 5 ??????    · ??????? ?????????????????????????????????????????????????????????????????? ???????????????????????       ????????????????????? ?????????????????  © [address] 2021 Information is for End User's use only and may not be sold, redistributed or otherwise used for commercial purposes  All illustrations and images included in CareNotes® are the copyrighted property of [employer]  or [address] [place], [place] [address]   The above information is an  only  It is not intended as medical advice for individual conditions or treatments  Talk to your doctor, nurse or pharmacist before following any medical regimen to see if it is safe and effective for you      ????????????   ??????   ??????? ????????????????????????????????????????????????????????????????????????????????????????????????????????????  ???????????????????  · ????????????????????????????????????????????????????????????????????????????????????????????????????????????????????????????????????????????    · ?????????????????????????????????????????????????????????????????????????????????????????????????????????    ???????????  · ??? ???????? ?????????? 15 ? ?    ? 1 ??? (1 ??) ? 1 ??????? (6 ?)    ? ½ ???? ¼ ????? (? 1??)    ? 1 ??? (? 4 ???? ¼ ???)    · ???? ???????????????????????????????????????????? ?????????? 15 ? ?    ? ¾ ???????????? ¼ ?????    ? ½ ?????????    ? ? ???????????    · ????????? ???????? ?????????? 15 ? ?    ? ½ ??????????????    ? ¼ ?????????    ? ½ ????????? (???????????????????)    · ?????? ???????? ?????????? 15 ? ?    ? 3 ??????? 8 ??????    ? 6 ?????    ? 3 ????? ¾ ?????????????    · ??? ???????? ?????????? 15 ? ?    ? 1 ?? (4 ??) ????? ¾ ? 1 ???    ? ½ ????????????????    ? ½ ? (4 ??) ??????    ? 2 ?????    · ?????? ???????? ?????????? 15 ? ?    ? 2 ?????????????????    ? 2 ????    ? ½ ????????????????    ? ¼ ????????    ? 1 ????????????    ? 2 ?????    · ?????? ??????? 12 ?????? ?    ? 1 ????????    ? 1 ?????    ? ? ???????????????    · ??????? ????? 5 ?????? ?????????????????????     ? ½ ??????? 1 ??????????????????????????????????    ? ½ ????    ????????????????  · ???????????????     ? ????????? ???????????????? 8 ??????????????????? 4 ???????????????? ? ?????????????? 3 ????????? 8 ?????? 1 ??????????????? 4 ?????????????????????????      · ????????????????? ????????????????????????????????????????????????????????????????????????????????????????????     ? ?????? ?????????? 2 ?????? (30 ??) ?????????????????????????????????????????????? (1 ?) ? ? 25 ?????????????????????? 50 ???????????????????      ??????????????? ?????????????????  © Copyright [address] 2021 Information is for End User's use only and may not be sold, redistributed or otherwise used for commercial purposes  All illustrations and images included in CareNotes® are the copyrighted property of [employer]  or [facility] Tiffany Barrett   The above information is an  only  It is not intended as medical advice for individual conditions or treatments  Talk to your doctor, nurse or pharmacist before following any medical regimen to see if it is safe and effective for you

## 2021-06-25 PROBLEM — R42 DIZZINESS: Status: ACTIVE | Noted: 2021-06-25

## 2021-06-25 NOTE — ASSESSMENT & PLAN NOTE
Lab Results   Component Value Date    HGBA1C 7 1 (A) 06/24/2021     - Currently being managed by endocrinology  - Continue metformin 1,000mg BID with meals in conjunction with diet and lifestyle modifications  He has been on Jardiance 25mg in the past however it is not being covered by his insurance  I have asked patient to discuss this with his endocrinologist at his upcoming appointment  - Continue lisinopril 5mg for microalbuminuria   - Patient up-to-date with eye exam and diabetic foot exam performed today

## 2021-06-25 NOTE — ASSESSMENT & PLAN NOTE
BMI Counseling: Body mass index is 33 64 kg/m²  The BMI is above normal  Nutrition recommendations include decreasing overall calorie intake, 3-5 servings of fruits/vegetables daily, consuming healthier snacks and moderation in carbohydrate intake  Exercise recommendations include exercising 3-5 times per week

## 2021-06-25 NOTE — PROGRESS NOTES
Assessment/Plan:    Type 2 diabetes mellitus without complication, without long-term current use of insulin (HCC)    Lab Results   Component Value Date    HGBA1C 7 1 (A) 06/24/2021     - Currently being managed by endocrinology  - Continue metformin 1,000mg BID with meals in conjunction with diet and lifestyle modifications  He has been on Jardiance 25mg in the past however it is not being covered by his insurance  I have asked patient to discuss this with his endocrinologist at his upcoming appointment  - Continue lisinopril 5mg for microalbuminuria   - Patient up-to-date with eye exam and diabetic foot exam performed today  Class 1 obesity due to excess calories with serious comorbidity and body mass index (BMI) of 33 0 to 33 9 in adult  BMI Counseling: Body mass index is 33 64 kg/m²  The BMI is above normal  Nutrition recommendations include decreasing overall calorie intake, 3-5 servings of fruits/vegetables daily, consuming healthier snacks and moderation in carbohydrate intake  Exercise recommendations include exercising 3-5 times per week  Dizziness  - This could be secondary to BBPV; other differentials include vestibular migraines  - No abnormalities on neurological examination and orthostatic vital signs are negative  Patient did have dizziness with [place]-Hallpike maneuvers but with no upward beating nystagmus  Will trial patient on meclizine 12 5mg Q12 H PRN and refer patient for vestibular therapy  Advised patient to keep well hydrated  - If dizziness persists consider imaging        Diagnoses and all orders for this visit:    Type 2 diabetes mellitus without complication, without long-term current use of insulin (HCC)  -     POCT hemoglobin A1c  -     PNEUMOCOCCAL POLYSACCHARIDE VACCINE 23-VALENT =>1YO SQ IM    Encounter for screening colonoscopy  -     Ambulatory referral to Gastroenterology;  Future    Need for vaccination  -     PNEUMOCOCCAL POLYSACCHARIDE VACCINE 23-VALENT =>1YO SQ IM    Class 1 obesity due to excess calories with serious comorbidity and body mass index (BMI) of 33 0 to 33 9 in adult    Dizziness  -     Ambulatory referral to Physical Therapy; Future  -     meclizine (ANTIVERT) 12 5 MG tablet; Take 1 tablet (12 5 mg total) by mouth every 12 (twelve) hours as needed for dizziness          Subjective:      Patient ID: Brandon Davenport is a 61 y o  male  [employer] video Mandarin  utilized     HPI    Brandon Davenport is a 61year old male with a past medical history of type 2 diabetes mellitus who presents today for a follow up  At the previous office visit patient had multiple skin tags removed with cryotherapy which have healed  There are few more on his neck which he would like removed  Today patient states that he has been experiencing intermittent episodes of dizziness which started just over a month ago  Patient states that the dizziness occurs with position changes  He denies any recent injury or trauma prior to the onset of the dizziness and there have been a few occasions where he had accompanying headaches and blurry vision  He has established care with ophthalmology and was recently seen last month  He has upcoming appointment in December  Review of Systems   Constitutional: Negative  HENT: Negative  Eyes: Negative  Respiratory: Negative  Cardiovascular: Negative  Gastrointestinal: Negative  Musculoskeletal: Negative  Skin:        Skin tags    Neurological: Positive for dizziness  Psychiatric/Behavioral: Negative  Objective:      BP 98/62 (BP Location: Left arm, Patient Position: Standing, Cuff Size: Standard)   Pulse 81   Temp (!) 97 °F (36 1 °C) (Temporal)   Resp 16   Ht 5' 4" (1 626 m)   Wt 88 9 kg (196 lb)   SpO2 97%   BMI 33 64 kg/m²          Physical Exam  Constitutional:       General: He is not in acute distress  Appearance: Normal appearance  He is obese  HENT:      Head: Normocephalic and atraumatic  Mouth/Throat:      Mouth: Mucous membranes are moist    Eyes:      General:         Right eye: No discharge  Left eye: No discharge  Extraocular Movements: Extraocular movements intact  Pupils: Pupils are equal, round, and reactive to light  Cardiovascular:      Rate and Rhythm: Normal rate  Pulses: no weak pulses          Dorsalis pedis pulses are 2+ on the right side and 2+ on the left side  Posterior tibial pulses are 2+ on the right side and 2+ on the left side  Pulmonary:      Effort: Pulmonary effort is normal  No respiratory distress  Musculoskeletal:         General: Normal range of motion  Cervical back: Normal range of motion  Right lower leg: No edema  Left lower leg: No edema  Feet:      Right foot:      Skin integrity: No ulcer, skin breakdown, erythema, warmth, callus or dry skin  Left foot:      Skin integrity: No ulcer, skin breakdown, erythema, warmth, callus or dry skin  Neurological:      General: No focal deficit present  Mental Status: He is alert and oriented to person, place, and time  Cranial Nerves: No cranial nerve deficit  Sensory: No sensory deficit  Motor: No weakness  Coordination: Coordination normal       Gait: Gait normal       Deep Tendon Reflexes: Reflexes normal       Comments: Negative Bing Whitaker   Psychiatric:         Mood and Affect: Mood normal          Behavior: Behavior normal        Patient's shoes and socks removed  Right Foot/Ankle   Right Foot Inspection  Skin Exam: skin normal and skin intact no dry skin, no warmth, no callus, no erythema, no maceration, no abnormal color, no pre-ulcer, no ulcer and no callus                            Sensory     Proprioception: intact   Monofilament testing: intact  Vascular    The right DP pulse is 2+  The right PT pulse is 2+       Left Foot/Ankle  Left Foot Inspection  Skin Exam: skin normal and skin intactno dry skin, no warmth, no erythema, no maceration, normal color, no pre-ulcer, no ulcer and no callus                                         Sensory     Proprioception: intact  Monofilament: intact  Vascular    The left DP pulse is 2+  The left PT pulse is 2+  Assign Risk Category:  No deformity present; No loss of protective sensation;  No weak pulses       Risk: 0

## 2021-06-25 NOTE — ASSESSMENT & PLAN NOTE
- This could be secondary to BBPV; other differentials include vestibular migraines  - No abnormalities on neurological examination and orthostatic vital signs are negative  Patient did have dizziness with [place]-Hallpike maneuvers but with no upward beating nystagmus  Will trial patient on meclizine 12 5mg Q12 H PRN and refer patient for vestibular therapy  Advised patient to keep well hydrated    - If dizziness persists consider imaging

## 2021-06-29 DIAGNOSIS — Z74.8 ASSISTANCE NEEDED WITH TRANSPORTATION: Primary | ICD-10-CM

## 2021-06-30 DIAGNOSIS — Z74.8 ASSISTANCE NEEDED WITH TRANSPORTATION: Primary | ICD-10-CM

## 2021-07-01 ENCOUNTER — PROCEDURE VISIT (OUTPATIENT)
Dept: FAMILY MEDICINE CLINIC | Facility: CLINIC | Age: 61
End: 2021-07-01

## 2021-07-01 VITALS
HEIGHT: 64 IN | TEMPERATURE: 97 F | DIASTOLIC BLOOD PRESSURE: 60 MMHG | HEART RATE: 91 BPM | OXYGEN SATURATION: 97 % | SYSTOLIC BLOOD PRESSURE: 92 MMHG | WEIGHT: 198 LBS | BODY MASS INDEX: 33.8 KG/M2 | RESPIRATION RATE: 16 BRPM

## 2021-07-01 DIAGNOSIS — L91.8 SKIN TAGS, MULTIPLE ACQUIRED: Primary | ICD-10-CM

## 2021-07-01 DIAGNOSIS — L91.8 INFLAMED SKIN TAG: ICD-10-CM

## 2021-07-01 PROCEDURE — 99214 OFFICE O/P EST MOD 30 MIN: CPT | Performed by: FAMILY MEDICINE

## 2021-07-01 PROCEDURE — 17110 DESTRUCTION B9 LES UP TO 14: CPT | Performed by: FAMILY MEDICINE

## 2021-07-01 NOTE — ASSESSMENT & PLAN NOTE
Skin tags removed with excision and electrodesiccation  These pathognomonic lesions are not sent for pathology  Patient tolerated the procedure well with no immediate complaints

## 2021-07-01 NOTE — PATIENT INSTRUCTIONS
Skin Tags   AMBULATORY CARE:   A skin tag  is a small growth that forms on the skin  The growth hangs off the skin from a small piece of tissue called a stalk  A skin tag often grows where skin rubs against skin and causes friction  Diabetes or obesity can increase your risk for skin tags  The risk also increases with age  Skin tags are usually harmless  Signs and symptoms of a skin tag: You may have a few skin tags in the same area  This is common  A skin tag is usually the same color as your skin, or it may be a little darker  A skin tag is usually small but can be as large as 1/2 inch (1 centimeter)  Skin tags usually do not cause pain, but they can cause irritation by rubbing against your clothes or jewelry  Common areas for skin tags to grow are your underarms, between folds of skin, eyelids, or inner thighs  They can also grow on your neck or other body areas  Contact your healthcare provider if:   · The skin tag changes in size, shape, or color  · You have a rash or other skin problem around the skin tag  · The skin tag bleeds  · Your skin tag is affecting your ability to do your daily activities  · You have questions or concerns about your condition or care  Treatment  may not be needed  The skin tag will not go away on its own, but you may not notice it or be bothered by it  You can help remove a skin tag by tying a string or dental floss around the skin tag  This will cut off the blood supply to the skin tag, and it will fall off after a few days  The following may be needed if the skin tag irritates your skin:  · Cryotherapy  is a procedure used to freeze the skin tag  Your healthcare provider uses liquid nitrogen to freeze the area  · Cauterization  is a procedure used to burn the skin tag off  Your healthcare provider uses a device to burn the area  · Surgery  may be used to remove the skin tag  Do not try to cut the skin tag off by yourself with a sharp object   Skin tags can bleed heavily when they still have a blood supply  Manage or prevent skin tags:   · You can put a bandage over the skin tag to help with irritation  Change the bandage every day or if it gets wet or dirty  · Skin tags often cannot be prevented  You may be able to lower your risk by losing weight  This will reduce skin folds where skin tags tend to form  Talk to your healthcare provider about how much you should weigh  He or she can help you create a safe weight loss plan  Follow up with your healthcare provider as directed:  Write down your questions so you remember to ask them during your visits  © Copyright [address] Information is for End User's use only and may not be sold, redistributed or otherwise used for commercial purposes  All illustrations and images included in CareNotes® are the copyrighted property of A D A M , Inc  or Bishop Barrett   The above information is an  only  It is not intended as medical advice for individual conditions or treatments  Talk to your doctor, nurse or pharmacist before following any medical regimen to see if it is safe and effective for you

## 2021-07-01 NOTE — PROGRESS NOTES
Assessment/Plan:    Problem List Items Addressed This Visit        Musculoskeletal and Integument    Skin tags, multiple acquired - Primary     Skin tags removed with excision and electrodesiccation  These pathognomonic lesions are not sent for pathology  Patient tolerated the procedure well with no immediate complaints  Relevant Orders    Lesion Destruction (Completed)      Other Visit Diagnoses     Inflamed skin tag        Relevant Orders    Lesion Destruction (Completed)          Subjective:      Patient ID: Vivek Ruano is a 61 y o  male  This is a very pleasant 51-year-old Mandarin speaking male who presents today for or skin tag removal   On his neck  He previously had some removed with cryotherapy and he responded well to this  The following portions of the patient's history were reviewed and updated as appropriate: allergies, current medications, past family history, past medical history, past social history, past surgical history and problem list     Review of Systems   Constitutional: Negative for activity change, appetite change, chills and fever  HENT: Negative for congestion, dental problem, hearing loss, rhinorrhea, sinus pain and sore throat  Eyes: Negative for photophobia, pain and visual disturbance  Respiratory: Negative for cough, chest tightness, shortness of breath and wheezing  Cardiovascular: Negative for chest pain, palpitations and leg swelling  Gastrointestinal: Negative for abdominal pain, blood in stool, constipation, diarrhea, nausea and vomiting  Genitourinary: Negative for difficulty urinating, dysuria, frequency, hematuria and urgency  Musculoskeletal: Negative for arthralgias, back pain, myalgias and neck pain  Skin: Negative for rash  Multiple skin tags along the  Anterior collar of the neck, as well as bilateral under arms causing irritation and itching   Neurological: Negative for dizziness, weakness, light-headedness and headaches  Psychiatric/Behavioral: Negative for agitation, behavioral problems, sleep disturbance and suicidal ideas  Objective:    BP 92/60 (BP Location: Left arm, Patient Position: Sitting, Cuff Size: Standard)   Pulse 91   Temp (!) 97 °F (36 1 °C) (Temporal)   Resp 16   Ht 5' 4" (1 626 m)   Wt 89 8 kg (198 lb)   SpO2 97%   BMI 33 99 kg/m²      Physical Exam  Vitals and nursing note reviewed  Constitutional:       Appearance: Normal appearance  He is well-developed  He is obese  He is not ill-appearing  HENT:      Head: Normocephalic and atraumatic  Eyes:      Pupils: Pupils are equal, round, and reactive to light  Neck:     Cardiovascular:      Rate and Rhythm: Normal rate and regular rhythm  Heart sounds: Normal heart sounds  No murmur heard  Pulmonary:      Effort: Pulmonary effort is normal  No respiratory distress  Musculoskeletal:         General: No tenderness  Normal range of motion  Cervical back: Normal range of motion and neck supple  Comments: Multiple skin tags in the bilateral under arms   Skin:     General: Skin is warm and dry  Capillary Refill: Capillary refill takes less than 2 seconds  Neurological:      Mental Status: He is alert and oriented to person, place, and time           Lesion Destruction    Date/Time: 7/1/2021 1:20 PM  Performed by: Oel Mendoza MD  Authorized by: Cam Aparicio MD   Universal Protocol:  Procedure performed by: (Medical student)  Patient understanding: patient states understanding of the procedure being performed  Patient consent: the patient's understanding of the procedure matches consent given  Procedure consent: procedure consent matches procedure scheduled  Relevant documents: relevant documents present and verified  Site marked: the operative site was marked  Patient identity confirmed: verbally with patient      Procedure Details - Lesion Destruction:     Number of Lesions:  6  Lesion 1:     Body area:  Head/neck Head/neck location:  Neck    Malignancy: benign lesion      Destruction method: electrodesiccation and curettage    Lesion 2:     Body area:  Head/neck    Head/neck location:  Neck    Malignancy: benign lesion      Destruction method: electrodesiccation and curettage    Lesion 3:     Body area:  Head/neck    Head/neck location:  Neck    Malignancy: benign lesion      Destruction method: scissors used for extraction    Lesion 4:     Body area:  Upper extremity    Upper extremity location:  R upper arm (underarm)    Malignancy: benign lesion      Destruction method: electrodesiccation and curettage    Lesion 5:     Body area:  Upper extremity    Upper extremity location:  R upper arm    Malignancy: benign lesion      Destruction method: electrodesiccation and curettage    Lesion 6:     Body area:  Upper extremity    Upper extremity location:  L upper arm (underarm)    Malignancy: benign lesion      Destruction method: electrodesiccation and curettage       Lesion 7 Body Area:  right upper extremity, right underarm  Malignancy type:  benign lesion  Destruction Method: electrodesiccation and curettage   Cosmetic: No     Lesion 8  Body Area:  head and neck, left neck  Malignancy type:  benign lesion  Destruction Method:  shave removal  Cosmetic: No     Lesion 9  Body Area:  head and neck, left neck  Malignancy type:  benign lesion  Destruction Method:  shave removal  Cosmetic: No     Lesion 10 Body Area:  head and neck, left neck  Malignancy type:  benign lesion  Destruction Method:   Electrodesiccation and curettage  Cosmetic: No     Lesion 11 Body Area:  head and neck, left neck  Malignancy type:  benign lesion  Destruction Method:  Electrodesiccation and curettage  Cosmetic: No            Steven Jaimes MD  07/01/21  4:03 PM

## 2021-07-07 ENCOUNTER — APPOINTMENT (OUTPATIENT)
Dept: LAB | Age: 61
End: 2021-07-07
Payer: COMMERCIAL

## 2021-07-07 DIAGNOSIS — E05.90 THYROTOXICOSIS WITHOUT THYROID STORM, UNSPECIFIED THYROTOXICOSIS TYPE: ICD-10-CM

## 2021-07-07 DIAGNOSIS — R80.9 TYPE 2 DIABETES MELLITUS WITH MICROALBUMINURIA, WITHOUT LONG-TERM CURRENT USE OF INSULIN (HCC): ICD-10-CM

## 2021-07-07 DIAGNOSIS — E11.29 TYPE 2 DIABETES MELLITUS WITH MICROALBUMINURIA, WITHOUT LONG-TERM CURRENT USE OF INSULIN (HCC): ICD-10-CM

## 2021-07-07 LAB
ALBUMIN SERPL BCP-MCNC: 3.6 G/DL (ref 3.5–5)
ALP SERPL-CCNC: 44 U/L (ref 46–116)
ALT SERPL W P-5'-P-CCNC: 28 U/L (ref 12–78)
ANION GAP SERPL CALCULATED.3IONS-SCNC: 5 MMOL/L (ref 4–13)
AST SERPL W P-5'-P-CCNC: 13 U/L (ref 5–45)
BASOPHILS # BLD AUTO: 0.04 THOUSANDS/ΜL (ref 0–0.1)
BASOPHILS NFR BLD AUTO: 1 % (ref 0–1)
BILIRUB SERPL-MCNC: 0.59 MG/DL (ref 0.2–1)
BUN SERPL-MCNC: 15 MG/DL (ref 5–25)
CALCIUM SERPL-MCNC: 8.6 MG/DL (ref 8.3–10.1)
CHLORIDE SERPL-SCNC: 111 MMOL/L (ref 100–108)
CHOLEST SERPL-MCNC: 103 MG/DL (ref 50–200)
CO2 SERPL-SCNC: 25 MMOL/L (ref 21–32)
CREAT SERPL-MCNC: 0.85 MG/DL (ref 0.6–1.3)
EOSINOPHIL # BLD AUTO: 0.23 THOUSAND/ΜL (ref 0–0.61)
EOSINOPHIL NFR BLD AUTO: 3 % (ref 0–6)
ERYTHROCYTE [DISTWIDTH] IN BLOOD BY AUTOMATED COUNT: 13.2 % (ref 11.6–15.1)
EST. AVERAGE GLUCOSE BLD GHB EST-MCNC: 148 MG/DL
GFR SERPL CREATININE-BSD FRML MDRD: 95 ML/MIN/1.73SQ M
GLUCOSE P FAST SERPL-MCNC: 92 MG/DL (ref 65–99)
HBA1C MFR BLD: 6.8 %
HCT VFR BLD AUTO: 41.9 % (ref 36.5–49.3)
HDLC SERPL-MCNC: 38 MG/DL
HGB BLD-MCNC: 13.5 G/DL (ref 12–17)
IMM GRANULOCYTES # BLD AUTO: 0.01 THOUSAND/UL (ref 0–0.2)
IMM GRANULOCYTES NFR BLD AUTO: 0 % (ref 0–2)
LDLC SERPL CALC-MCNC: 46 MG/DL (ref 0–100)
LYMPHOCYTES # BLD AUTO: 4.41 THOUSANDS/ΜL (ref 0.6–4.47)
LYMPHOCYTES NFR BLD AUTO: 54 % (ref 14–44)
MCH RBC QN AUTO: 28.9 PG (ref 26.8–34.3)
MCHC RBC AUTO-ENTMCNC: 32.2 G/DL (ref 31.4–37.4)
MCV RBC AUTO: 90 FL (ref 82–98)
MONOCYTES # BLD AUTO: 0.33 THOUSAND/ΜL (ref 0.17–1.22)
MONOCYTES NFR BLD AUTO: 4 % (ref 4–12)
NEUTROPHILS # BLD AUTO: 3.07 THOUSANDS/ΜL (ref 1.85–7.62)
NEUTS SEG NFR BLD AUTO: 38 % (ref 43–75)
NONHDLC SERPL-MCNC: 65 MG/DL
NRBC BLD AUTO-RTO: 0 /100 WBCS
PLATELET # BLD AUTO: 159 THOUSANDS/UL (ref 149–390)
PMV BLD AUTO: 11.2 FL (ref 8.9–12.7)
POTASSIUM SERPL-SCNC: 3.6 MMOL/L (ref 3.5–5.3)
PROT SERPL-MCNC: 8 G/DL (ref 6.4–8.2)
RBC # BLD AUTO: 4.67 MILLION/UL (ref 3.88–5.62)
SODIUM SERPL-SCNC: 141 MMOL/L (ref 136–145)
TRIGL SERPL-MCNC: 96 MG/DL
TSH SERPL DL<=0.05 MIU/L-ACNC: 0.68 UIU/ML (ref 0.36–3.74)
WBC # BLD AUTO: 8.09 THOUSAND/UL (ref 4.31–10.16)

## 2021-07-07 PROCEDURE — 80061 LIPID PANEL: CPT

## 2021-07-07 PROCEDURE — 36415 COLL VENOUS BLD VENIPUNCTURE: CPT

## 2021-07-07 PROCEDURE — 84443 ASSAY THYROID STIM HORMONE: CPT

## 2021-07-07 PROCEDURE — 83036 HEMOGLOBIN GLYCOSYLATED A1C: CPT

## 2021-07-07 PROCEDURE — 80053 COMPREHEN METABOLIC PANEL: CPT

## 2021-07-07 PROCEDURE — 85025 COMPLETE CBC W/AUTO DIFF WBC: CPT

## 2021-07-29 ENCOUNTER — PATIENT OUTREACH (OUTPATIENT)
Dept: FAMILY MEDICINE CLINIC | Facility: CLINIC | Age: 61
End: 2021-07-29

## 2021-07-29 DIAGNOSIS — Z74.8 ASSISTANCE NEEDED WITH TRANSPORTATION: Primary | ICD-10-CM

## 2021-07-29 NOTE — PROGRESS NOTES
CARLIE received a referral in regard to pt needing assistance with transportation services  I contacted pt using [employer] services for [place] ,  # [id number]  I spoke with pt and he expressed that that he son tries as best he can to assist with transportation, but it is difficult due to his work schedule  EAGLE explained the [employer] program to pt and he is interested  However, pt has difficulty completing applications due to language barrier  EAGLE is unsure if [facility] has interpretation for all languages  CARLIE will contact [facility] 2 [phone] [phone], but it is after hours, so I will be unable to speak with someone today  CARLIE will also send referral to [facility] to inquire if they can assist in regard to language barrier for application  CARLIE will continue to assist pt

## 2021-08-09 ENCOUNTER — PATIENT OUTREACH (OUTPATIENT)
Dept: FAMILY MEDICINE CLINIC | Facility: CLINIC | Age: 61
End: 2021-08-09

## 2021-08-09 NOTE — PROGRESS NOTES
Outgoing Calls:  8/9/2021    CHW did call the language line for assistance in reaching pt  Pat did assist in this call  CHW was able to introduce herself and her role  CHW discussed referral received for transportation services  Pt was informed on what to expect when applying as he expressed interest in moving forward with this application  CHW informed him that she will need two signatures from pt to complete the application  Pt stated he will be able to come in today to complete the application with CHW and sign  Next outreach is scheduled for 8/9/2021 at 1:15PM, at [facility] did meet with CHW at [place] [facility] and did sign application  CHW was able to communicate with pt in [place]  Pt was informed that he will receive a call from Joe Hanley at [facility] to schedule a [facility] evaluation to see if pt is eligible for transportation services  Pt expressed understanding  CHW did email application to [facility] and informed them tat pt will be in need of a   Next outreach is scheduled for 8/11/2021

## 2021-08-11 ENCOUNTER — PATIENT OUTREACH (OUTPATIENT)
Dept: FAMILY MEDICINE CLINIC | Facility: CLINIC | Age: 61
End: 2021-08-11

## 2021-08-11 NOTE — PROGRESS NOTES
Outgoing Call:  8/11/2021    CHW did call Gustavo Aguayo at [facility] to discuss evaluation needed for pt  CHW explained that pt will need an interpretor as he primarily speaks [place] ([facility])  Gustavo Aguayo informed CHW that they do have translators and this should not be a problem but will have Yordan call CHW back to confirm this is the case  Next outreach is scheduled for 8/16/2021

## 2021-08-16 ENCOUNTER — PATIENT OUTREACH (OUTPATIENT)
Dept: FAMILY MEDICINE CLINIC | Facility: CLINIC | Age: 61
End: 2021-08-16

## 2021-08-16 NOTE — PROGRESS NOTES
Outgoing Calls:  8/16/2021    CHW did call Gene Borrego at [place] [address] services to ensure pt will have a  available to complete his evaluation for [place] [address] services  She stated that Hilda Rivera is aware and this will not be an issue  CHW did call the Language Line to receive assistance in calling pt to inform him of upcoming appointment with Christal  Pt was not available and a detailed message was left for him with assistance from   Next outreach is scheduled for 8/23/2021

## 2021-08-23 ENCOUNTER — PATIENT OUTREACH (OUTPATIENT)
Dept: FAMILY MEDICINE CLINIC | Facility: CLINIC | Age: 61
End: 2021-08-23

## 2021-08-23 NOTE — PROGRESS NOTES
Outgoing Calls:  8/23/2021    CHW did call Language Line to request assistance in calling pt as he speaks [place] Mandarin  CHW was assisted with this call by Lucius Chi  CHW did call pt to inform him of upcoming [facility] evaluation for [address] services  CHW did schedule an appointment on behalf of pt for [address] services  This appointment will take place on 8/31/21 at 11AM  [facility]   A  will be available for pt during the evaluation  Pt was available and pt expressed understanding  Pt agreed to this scheduled appointment  Pt also informed CHW that he has had some concerns about his physical health  Pt stated that he has had some discomfort possibly in the genital areas  It was difficult for CHW to understand some of what  was saying  Pt stated that due to age and gender difference between himself and provider, he has refrained from expressing his health concerns  Pt is concerned that if he mentions any issues with pain in the genital area he may be accused of being inappropriate  CHW encouraged pt to express any health concerns and to be open with his PCP  PCP will not be able to give him the best care if he is not forthcoming with his concerns  Pt stated he will discuss his health concerns with his PCP at his upcoming appointment next week  Next outreach is scheduled for 8/30/21  Next outreach is scheduled for 8/30/21

## 2021-08-30 ENCOUNTER — PATIENT OUTREACH (OUTPATIENT)
Dept: FAMILY MEDICINE CLINIC | Facility: CLINIC | Age: 61
End: 2021-08-30

## 2021-08-30 NOTE — PROGRESS NOTES
Incoming Call:  8/30/2021    CHW did receive a call from Sarahi at [facility]  Sarahi stated that she spoke with pt this morning and confirmed his appointment with [facility] tomorrow for an evaluation  She stated that pt expressed understanding and confirmed he will be attending  Pt will be provided transportation by [facility]  CHW will f/u with Tez and pt within a week  Next outreach is scheduled for 9/6/2021

## 2021-09-07 ENCOUNTER — PATIENT OUTREACH (OUTPATIENT)
Dept: FAMILY MEDICINE CLINIC | Facility: CLINIC | Age: 61
End: 2021-09-07

## 2021-09-07 NOTE — PROGRESS NOTES
Outgoing Call / Chart Review:  9/7/2021    CHW did call Te[facility] to inquire about status of pt's evaluation and if he is eligible for [place] [address] services  Voice message left requesting a call in return  Chart reviewed  Next outreach is scheduled for 9/14/2021

## 2021-09-08 ENCOUNTER — PATIENT OUTREACH (OUTPATIENT)
Dept: FAMILY MEDICINE CLINIC | Facility: CLINIC | Age: 61
End: 2021-09-08

## 2021-09-08 NOTE — PROGRESS NOTES
Outgoing Call:  9/8/2021    CHW did call [employer] and spoke with Usama Borrego in reference to pt's application status  Adeola informed CHW that she will be reviewing his assessment tomorrow and will make a decision  Pt should receive a letter by next week  CHW was informed she could call back to inquire  Next outreach is scheduled for 9/15/2021

## 2021-09-09 DIAGNOSIS — E11.9 TYPE 2 DIABETES MELLITUS WITHOUT COMPLICATION, WITHOUT LONG-TERM CURRENT USE OF INSULIN (HCC): ICD-10-CM

## 2021-09-09 RX ORDER — LISINOPRIL 5 MG/1
TABLET ORAL
Qty: 90 TABLET | Refills: 0 | Status: SHIPPED | OUTPATIENT
Start: 2021-09-09 | End: 2021-09-27

## 2021-09-15 ENCOUNTER — PATIENT OUTREACH (OUTPATIENT)
Dept: FAMILY MEDICINE CLINIC | Facility: CLINIC | Age: 61
End: 2021-09-15

## 2021-09-16 NOTE — PROGRESS NOTES
Outgoing Calls:  9/15/93064    CHW did call [facility] and spoke with Dorene Mijares in reference to pt's [employer] application  Dorene Mijares confirmed pt has been approved and will not need to pay for the service  Pt is only eligible for medical trips  CHW did call pt and explained services to his son who was able to translate  CHW informed them of welcome packet that was sent to pt  CHW informed pt that this referral will be closed as needs have been met  Pt thanked CHW for her time  No further outreach scheduled

## 2021-09-27 ENCOUNTER — OFFICE VISIT (OUTPATIENT)
Dept: FAMILY MEDICINE CLINIC | Facility: CLINIC | Age: 61
End: 2021-09-27

## 2021-09-27 VITALS
BODY MASS INDEX: 34.31 KG/M2 | HEIGHT: 64 IN | SYSTOLIC BLOOD PRESSURE: 108 MMHG | DIASTOLIC BLOOD PRESSURE: 60 MMHG | OXYGEN SATURATION: 99 % | RESPIRATION RATE: 16 BRPM | TEMPERATURE: 96 F | WEIGHT: 201 LBS | HEART RATE: 90 BPM

## 2021-09-27 DIAGNOSIS — L91.8 INFLAMED SKIN TAG: ICD-10-CM

## 2021-09-27 DIAGNOSIS — R42 DIZZINESS: Primary | ICD-10-CM

## 2021-09-27 DIAGNOSIS — E11.9 TYPE 2 DIABETES MELLITUS WITHOUT COMPLICATION, WITHOUT LONG-TERM CURRENT USE OF INSULIN (HCC): ICD-10-CM

## 2021-09-27 DIAGNOSIS — R31.29 MICROHEMATURIA: ICD-10-CM

## 2021-09-27 DIAGNOSIS — R30.9 PAINFUL URINATION: ICD-10-CM

## 2021-09-27 LAB
SL AMB  POCT GLUCOSE, UA: NEGATIVE
SL AMB LEUKOCYTE ESTERASE,UA: NEGATIVE
SL AMB POCT BILIRUBIN,UA: NEGATIVE
SL AMB POCT BLOOD,UA: ABNORMAL
SL AMB POCT CLARITY,UA: CLEAR
SL AMB POCT COLOR,UA: YELLOW
SL AMB POCT KETONES,UA: NEGATIVE
SL AMB POCT NITRITE,UA: NEGATIVE
SL AMB POCT PH,UA: 5
SL AMB POCT SPECIFIC GRAVITY,UA: 1.01
SL AMB POCT URINE PROTEIN: ABNORMAL
SL AMB POCT UROBILINOGEN: 0.2

## 2021-09-27 PROCEDURE — 99213 OFFICE O/P EST LOW 20 MIN: CPT | Performed by: INTERNAL MEDICINE

## 2021-09-27 PROCEDURE — 81002 URINALYSIS NONAUTO W/O SCOPE: CPT | Performed by: INTERNAL MEDICINE

## 2021-09-27 RX ORDER — MECLIZINE HCL 12.5 MG/1
12.5 TABLET ORAL EVERY 12 HOURS PRN
Qty: 30 TABLET | Refills: 1 | Status: SHIPPED | OUTPATIENT
Start: 2021-09-27 | End: 2022-06-14

## 2021-09-27 RX ORDER — LISINOPRIL 2.5 MG/1
2.5 TABLET ORAL DAILY
Qty: 30 TABLET | Refills: 5 | Status: SHIPPED | OUTPATIENT
Start: 2021-09-27 | End: 2021-11-12 | Stop reason: ALTCHOICE

## 2021-09-27 NOTE — PATIENT INSTRUCTIONS
Skin Tags   AMBULATORY CARE:   A skin tag  is a small growth that forms on the skin  The growth hangs off the skin from a small piece of tissue called a stalk  A skin tag often grows where skin rubs against skin and causes friction  Diabetes or obesity can increase your risk for skin tags  The risk also increases with age  Skin tags are usually harmless  Signs and symptoms of a skin tag: You may have a few skin tags in the same area  This is common  A skin tag is usually the same color as your skin, or it may be a little darker  A skin tag is usually small but can be as large as 1/2 inch (1 centimeter)  Skin tags usually do not cause pain, but they can cause irritation by rubbing against your clothes or jewelry  Common areas for skin tags to grow are your underarms, between folds of skin, eyelids, or inner thighs  They can also grow on your neck or other body areas  Contact your healthcare provider if:   · The skin tag changes in size, shape, or color  · You have a rash or other skin problem around the skin tag  · The skin tag bleeds  · Your skin tag is affecting your ability to do your daily activities  · You have questions or concerns about your condition or care  Treatment  may not be needed  The skin tag will not go away on its own, but you may not notice it or be bothered by it  You can help remove a skin tag by tying a string or dental floss around the skin tag  This will cut off the blood supply to the skin tag, and it will fall off after a few days  The following may be needed if the skin tag irritates your skin:  · Cryotherapy  is a procedure used to freeze the skin tag  Your healthcare provider uses liquid nitrogen to freeze the area  · Cauterization  is a procedure used to burn the skin tag off  Your healthcare provider uses a device to burn the area  · Surgery  may be used to remove the skin tag  Do not try to cut the skin tag off by yourself with a sharp object   Skin tags can bleed heavily when they still have a blood supply  Manage or prevent skin tags:   · You can put a bandage over the skin tag to help with irritation  Change the bandage every day or if it gets wet or dirty  · Skin tags often cannot be prevented  You may be able to lower your risk by losing weight  This will reduce skin folds where skin tags tend to form  Talk to your healthcare provider about how much you should weigh  He or she can help you create a safe weight loss plan  Follow up with your healthcare provider as directed:  Write down your questions so you remember to ask them during your visits  © Copyright [facility] 2021 Information is for End User's use only and may not be sold, redistributed or otherwise used for commercial purposes  All illustrations and images included in CareNotes® are the copyrighted property of [employer]  or [employer]  The above information is an  only  It is not intended as medical advice for individual conditions or treatments  Talk to your doctor, nurse or pharmacist before following any medical regimen to see if it is safe and effective for you

## 2021-09-27 NOTE — PROGRESS NOTES
Assessment/Plan:    Dizziness  Following with cardiology - pending holter monitor  Echo and EKG wnl   On lisinopril 5 mg for renal protection, may contribute to possible orthostatics  Decrease to 2 5 mg   Keep track of symptoms   Trial meclizine as needed  Return in 1 month        Diagnoses and all orders for this visit:    Dizziness  -     meclizine (ANTIVERT) 12 5 MG tablet; Take 1 tablet (12 5 mg total) by mouth every 12 (twelve) hours as needed for dizziness    Type 2 diabetes mellitus without complication, without long-term current use of insulin (HCC)  -     lisinopril (ZESTRIL) 2 5 mg tablet; Take 1 tablet (2 5 mg total) by mouth daily    Microhematuria  Comments:  Quit smoking 6 years ago  endorses some difficulty with urination  Refer to urology   Orders:  -     Ambulatory referral to Urology; Future    Painful urination  Comments:  urine dip negative for infections  Orders:  -     POCT urine dip    Inflamed skin tag  Comments:  removed multiple in next region 2 wks ago  Endorses more in unerarms bilateral, and one irritated one on left neck line  Will return for removal        Subjective:      Patient ID: Remy De Los Santos is a 64 y o  male  This is a very pleasant 64 y o  male who presents to the clinic for management of their chronic medical conditions  Patient's medical conditions are stable unless noted otherwise above  Patient has not had any recent hospitalizations, or medical emergencies since last visit  Patient has no further complaints other than what is mentioned in the ROS  Patient is exclusively [place],  services were required for this exam  [facility] services were used  Patient verbalizes understanding of assessment and agrees with the plan    At end of visit patient endorsed irritation of his penis and anal region for the past month or so  Advised patient we need to schedule another appointment within the next week to discuss further       The following portions of the patient's history were reviewed and updated as appropriate: allergies, current medications, past family history, past medical history, past social history, past surgical history and problem list     Review of Systems   Constitutional: Negative for activity change, appetite change, chills and fever  HENT: Negative for congestion, dental problem, hearing loss, rhinorrhea, sinus pain and sore throat  Eyes: Negative for photophobia, pain and visual disturbance  Respiratory: Negative for cough, chest tightness, shortness of breath and wheezing  Cardiovascular: Negative for chest pain, palpitations and leg swelling  Gastrointestinal: Negative for abdominal pain, blood in stool, constipation, diarrhea, nausea and vomiting  Genitourinary: Negative for difficulty urinating, dysuria, frequency, hematuria and urgency  Musculoskeletal: Negative for arthralgias, back pain, myalgias and neck pain  Skin: Negative for rash  Multiple skin tags along the  Anterior collar of the neck, as well as bilateral under arms causing irritation and itching   Neurological: Negative for dizziness, weakness, light-headedness and headaches  Psychiatric/Behavioral: Negative for agitation, behavioral problems, sleep disturbance and suicidal ideas  Objective:    /60 (BP Location: Left arm, Patient Position: Sitting, Cuff Size: Large)   Pulse 90   Temp (!) 96 °F (35 6 °C) (Temporal)   Resp 16   Ht 5' 4" (1 626 m)   Wt 91 2 kg (201 lb)   SpO2 99%   BMI 34 50 kg/m²      Physical Exam  Vitals and nursing note reviewed  Constitutional:       Appearance: Normal appearance  He is well-developed  He is obese  He is not ill-appearing  HENT:      Head: Normocephalic and atraumatic  Right Ear: External ear normal       Left Ear: External ear normal       Nose: Nose normal  No congestion or rhinorrhea  Eyes:      Extraocular Movements: Extraocular movements intact        Conjunctiva/sclera: Conjunctivae normal    Cardiovascular:      Rate and Rhythm: Normal rate and regular rhythm  Pulses: Normal pulses  Heart sounds: Normal heart sounds  No murmur heard  Pulmonary:      Effort: Pulmonary effort is normal  No respiratory distress  Breath sounds: Normal breath sounds  No wheezing  Chest:      Chest wall: No tenderness  Abdominal:      General: Abdomen is flat  Bowel sounds are normal  There is no distension  Palpations: Abdomen is soft  Tenderness: There is no abdominal tenderness  Musculoskeletal:         General: No tenderness  Normal range of motion  Cervical back: Normal range of motion and neck supple  Right lower leg: No edema  Left lower leg: No edema  Skin:     General: Skin is warm and dry  Capillary Refill: Capillary refill takes less than 2 seconds  Comments: inflamed skin tag of left neck collar, multiple bilateral underarms   Neurological:      General: No focal deficit present  Mental Status: He is alert and oriented to person, place, and time     Psychiatric:         Mood and Affect: Mood normal          Behavior: Behavior normal            Samantha Stoll MD  09/27/21  12:50 PM

## 2021-09-27 NOTE — ASSESSMENT & PLAN NOTE
Following with cardiology - pending holter monitor   Echo and EKG wnl   On lisinopril 5 mg for renal protection, may contribute to possible orthostatics  Decrease to 2 5 mg   Keep track of symptoms   Trial meclizine as needed  Return in 1 month

## 2021-10-04 ENCOUNTER — OFFICE VISIT (OUTPATIENT)
Dept: FAMILY MEDICINE CLINIC | Facility: CLINIC | Age: 61
End: 2021-10-04

## 2021-10-04 VITALS
HEART RATE: 86 BPM | DIASTOLIC BLOOD PRESSURE: 74 MMHG | WEIGHT: 201 LBS | OXYGEN SATURATION: 98 % | SYSTOLIC BLOOD PRESSURE: 118 MMHG | TEMPERATURE: 97.6 F | HEIGHT: 64 IN | RESPIRATION RATE: 18 BRPM | BODY MASS INDEX: 34.31 KG/M2

## 2021-10-04 DIAGNOSIS — K62.89 ANAL OR RECTAL PAIN: ICD-10-CM

## 2021-10-04 DIAGNOSIS — R21 RASH OF GENITAL AREA: ICD-10-CM

## 2021-10-04 DIAGNOSIS — R21 RASH: Primary | ICD-10-CM

## 2021-10-04 DIAGNOSIS — J30.2 SEASONAL ALLERGIES: ICD-10-CM

## 2021-10-04 PROBLEM — B37.49 CANDIDA INFECTION OF GENITAL REGION: Status: RESOLVED | Noted: 2021-10-04 | Resolved: 2021-10-04

## 2021-10-04 PROBLEM — B37.49 CANDIDA INFECTION OF GENITAL REGION: Status: ACTIVE | Noted: 2021-10-04

## 2021-10-04 PROCEDURE — 99213 OFFICE O/P EST LOW 20 MIN: CPT | Performed by: FAMILY MEDICINE

## 2021-10-04 RX ORDER — LORATADINE 10 MG/1
10 TABLET ORAL DAILY
Qty: 90 TABLET | Refills: 0 | Status: SHIPPED | OUTPATIENT
Start: 2021-10-04 | End: 2022-01-03

## 2021-10-04 RX ORDER — NYSTATIN 100000 U/G
CREAM TOPICAL 2 TIMES DAILY
Qty: 30 G | Refills: 0 | Status: SHIPPED | OUTPATIENT
Start: 2021-10-04 | End: 2022-06-14 | Stop reason: ALTCHOICE

## 2021-10-28 ENCOUNTER — VBI (OUTPATIENT)
Dept: ADMINISTRATIVE | Facility: OTHER | Age: 61
End: 2021-10-28

## 2021-11-05 ENCOUNTER — PROCEDURE VISIT (OUTPATIENT)
Dept: FAMILY MEDICINE CLINIC | Facility: CLINIC | Age: 61
End: 2021-11-05

## 2021-11-05 VITALS
OXYGEN SATURATION: 97 % | TEMPERATURE: 97.7 F | SYSTOLIC BLOOD PRESSURE: 100 MMHG | DIASTOLIC BLOOD PRESSURE: 80 MMHG | WEIGHT: 204 LBS | HEART RATE: 87 BPM | RESPIRATION RATE: 18 BRPM | BODY MASS INDEX: 35.02 KG/M2

## 2021-11-05 DIAGNOSIS — L91.8 SKIN TAG: Primary | ICD-10-CM

## 2021-11-05 PROCEDURE — 11200 RMVL SKIN TAGS UP TO&INC 15: CPT | Performed by: FAMILY MEDICINE

## 2021-11-05 PROCEDURE — 99213 OFFICE O/P EST LOW 20 MIN: CPT | Performed by: FAMILY MEDICINE

## 2021-11-12 ENCOUNTER — OFFICE VISIT (OUTPATIENT)
Dept: FAMILY MEDICINE CLINIC | Facility: CLINIC | Age: 61
End: 2021-11-12

## 2021-11-12 VITALS
HEART RATE: 91 BPM | DIASTOLIC BLOOD PRESSURE: 68 MMHG | HEIGHT: 64 IN | RESPIRATION RATE: 18 BRPM | WEIGHT: 203.8 LBS | BODY MASS INDEX: 34.79 KG/M2 | OXYGEN SATURATION: 98 % | TEMPERATURE: 98.3 F | SYSTOLIC BLOOD PRESSURE: 108 MMHG

## 2021-11-12 DIAGNOSIS — Z12.11 SCREENING FOR COLON CANCER: ICD-10-CM

## 2021-11-12 DIAGNOSIS — E11.9 TYPE 2 DIABETES MELLITUS WITHOUT COMPLICATION, WITHOUT LONG-TERM CURRENT USE OF INSULIN (HCC): Primary | ICD-10-CM

## 2021-11-12 DIAGNOSIS — J11.1 INFLUENZA: ICD-10-CM

## 2021-11-12 LAB — SL AMB POCT HEMOGLOBIN AIC: 7.8 (ref ?–6.5)

## 2021-11-12 PROCEDURE — 90682 RIV4 VACC RECOMBINANT DNA IM: CPT | Performed by: FAMILY MEDICINE

## 2021-11-12 PROCEDURE — 83036 HEMOGLOBIN GLYCOSYLATED A1C: CPT | Performed by: FAMILY MEDICINE

## 2021-11-12 PROCEDURE — 99213 OFFICE O/P EST LOW 20 MIN: CPT | Performed by: FAMILY MEDICINE

## 2021-11-12 PROCEDURE — 90471 IMMUNIZATION ADMIN: CPT | Performed by: FAMILY MEDICINE

## 2021-11-12 RX ORDER — LISINOPRIL 2.5 MG/1
2.5 TABLET ORAL DAILY
Qty: 90 TABLET | Refills: 3 | Status: SHIPPED | OUTPATIENT
Start: 2021-11-12 | End: 2021-11-12

## 2021-11-12 RX ORDER — EMPAGLIFLOZIN 25 MG/1
25 TABLET, FILM COATED ORAL DAILY
Qty: 90 TABLET | Refills: 1 | Status: SHIPPED | OUTPATIENT
Start: 2021-11-12 | End: 2022-02-11 | Stop reason: SDUPTHER

## 2021-11-12 NOTE — PROGRESS NOTES
Assessment/Plan:    Type 2 diabetes mellitus without complication, without long-term current use of insulin (HCC)  Assessment:   · Last a1c from 9/2021 6 8, controlled  · Goal less than 7%, with FBG between 7-130 mg/d and 2 hr postprandial glucose of less than 180 mg/dl, poorly controlled  · Currently on metformin 1000 mg bid and jardiance 25 mg,  compliant  · Follows with endocrinology  · UA + for microalbuminuria on lisinopril 2 5 mg daily  · DM foot exam done today  Plan:   · Continue current treatment  · Will reassess A1C at next visit  · A1C goals reviewed with patient  · The patient was educated on the importance of medication compliance and to monitor blood glucose at home on a daily basis  · Continue to f/u with endocrinology   · Encourage life style changes including diabetes diet and exercise  · Will repeat a1c every  3 months, then q6 months  · Repeat foot exam yearly   · Follow up in 3 months  Dizziness  Assessment:   Controlled with meclizine 25 mg prn q12h  Echo and EKG were wnl   Lisinopril decreased at his last office visit from 5 to 2 5 mg  Unremarkable neuro exam, no recent URI  Plan  Continue to monitor  Continue Meclizine prn   Consider ENT and vestibular therapy with symptoms continue to persist    Reassess in 3 months        Diagnoses and all orders for this visit:    Type 2 diabetes mellitus without complication, without long-term current use of insulin (HCC)  -     Discontinue: lisinopril (ZESTRIL) 2 5 mg tablet; Take 1 tablet (2 5 mg total) by mouth daily  -     Discontinue: Empagliflozin (Jardiance) 25 MG TABS; Take 1 tablet (25 mg total) by mouth daily  -     POCT hemoglobin A1c    Influenza  -     influenza vaccine, quadrivalent, recombinant, PF, 0 5 mL, for patients 18 yr+ (FLUBLOK)    Screening for colon cancer  -     Ambulatory referral to Gastroenterology; Future          Subjective:      Patient ID: Eliane Rausch is a 64 y o  male      Eliane Rausch is a 64 y o   male with PMH significant for DM type 2, cholinergic urticaria obesity class 1 , who presents  c/o of recent episodes of dizziness please see below for more description of his symptoms  Denies fatigue, headaches, blurred vision, nausea, palpitation, chest pain, SOB, urinary changes, weakness, bowel changes, sleep problems,  sick contacts or recent travel  DM type 2: controlled with most recent A1C of 6 8, currently on Jardiance 25 mg daily, metformin 1000 mg bid  Denies side effects, compliant, follows with endocrinology  ON lisinopril 2 5 mg with last UA from 9/2021 showed microalbuminuria  Patient's medical conditions are stable unless noted otherwise above   Patient has not had any recent hospitalizations, or medical emergencies since last visit  Patient reports compliance with his meds; patient has no further complaints other than what is mentioned in the ROS  Dizziness  This is a new problem  The current episode started 1 to 4 weeks ago  The problem occurs intermittently  The problem has been gradually improving  Pertinent negatives include no abdominal pain, anorexia, arthralgias, change in bowel habit, chest pain, chills, congestion, coughing, diaphoresis, fatigue, fever, headaches, joint swelling, myalgias, nausea, neck pain, numbness, rash, sore throat, swollen glands, urinary symptoms, vertigo, visual change, vomiting or weakness  Nothing aggravates the symptoms  He has tried nothing for the symptoms  The treatment provided significant (on meclizine 12 5 mg q12h) relief  The following portions of the patient's history were reviewed and updated as appropriate: allergies, current medications, past family history, past medical history, past social history, past surgical history and problem list     Review of Systems   Constitutional: Negative for activity change, chills, diaphoresis, fatigue, fever and unexpected weight change  HENT: Negative for congestion and sore throat      Eyes: Negative for visual disturbance  Respiratory: Negative for cough, chest tightness, shortness of breath and wheezing  Cardiovascular: Negative for chest pain, palpitations and leg swelling  Gastrointestinal: Negative for abdominal distention, abdominal pain, anal bleeding, anorexia, blood in stool, change in bowel habit, constipation, diarrhea, nausea, rectal pain and vomiting  Genitourinary: Negative for difficulty urinating, dysuria and flank pain  Musculoskeletal: Negative for arthralgias, joint swelling, myalgias and neck pain  Skin: Negative for color change, pallor and rash  Neurological: Positive for dizziness  Negative for vertigo, speech difficulty, weakness, numbness and headaches  Psychiatric/Behavioral: Negative for sleep disturbance and suicidal ideas  Objective:      /68 (BP Location: Left arm, Patient Position: Sitting, Cuff Size: Standard)   Pulse 91   Temp 98 3 °F (36 8 °C) (Temporal)   Resp 18   Ht 5' 4" (1 626 m)   Wt 92 4 kg (203 lb 12 8 oz)   SpO2 98%   BMI 34 98 kg/m²          Physical Exam  Vitals and nursing note reviewed  Constitutional:       General: He is not in acute distress  Appearance: He is well-developed  He is not ill-appearing, toxic-appearing or diaphoretic  HENT:      Head: Normocephalic and atraumatic  Mouth/Throat:      Lips: Pink  Mouth: Mucous membranes are moist    Eyes:      General: No scleral icterus  Extraocular Movements: Extraocular movements intact  Cardiovascular:      Rate and Rhythm: Normal rate and regular rhythm  No extrasystoles are present  Pulses: no weak pulses          Radial pulses are 2+ on the right side and 2+ on the left side  Dorsalis pedis pulses are 2+ on the right side and 2+ on the left side  Posterior tibial pulses are 2+ on the right side and 2+ on the left side  Heart sounds: Normal heart sounds, S1 normal and S2 normal  No murmur heard  No friction rub   No gallop  Pulmonary:      Effort: Pulmonary effort is normal  No respiratory distress  Breath sounds: Normal breath sounds and air entry  Abdominal:      General: Bowel sounds are normal       Palpations: Abdomen is soft  There is no mass  Tenderness: There is no abdominal tenderness  There is no right CVA tenderness, left CVA tenderness, guarding or rebound  Musculoskeletal:         General: No swelling, tenderness, deformity or signs of injury  Normal range of motion  Cervical back: Normal range of motion  Right lower leg: No edema  Left lower leg: No edema  Feet:      Right foot:      Skin integrity: Dry skin present  No ulcer, skin breakdown, erythema, warmth or callus  Left foot:      Skin integrity: Dry skin present  No ulcer, skin breakdown, erythema, warmth or callus  Skin:     General: Skin is warm  Findings: No rash  Neurological:      General: No focal deficit present  Mental Status: He is alert and oriented to person, place, and time  Cranial Nerves: Cranial nerves are intact  Sensory: Sensation is intact  Motor: Motor function is intact  Coordination: Coordination is intact  Deep Tendon Reflexes: Reflexes are normal and symmetric  Diabetic Foot Exam    Patient's shoes and socks removed  Right Foot/Ankle   Right Foot Inspection  Skin Exam: dry skin  Skin not intact, no warmth, no callus, no erythema, no maceration, no abnormal color, no ulcer and no callus  Toe Exam: ROM and strength within normal limits  No swelling, no tenderness, erythema and  no right toe deformity    Sensory   Monofilament testing: intact    Vascular  Capillary refills: < 3 seconds  The right DP pulse is 2+  The right PT pulse is 2+  Left Foot/Ankle  Left Foot Inspection  Skin Exam: dry skin  Skin not intact, no warmth, no erythema, no maceration, normal color, no ulcer and no callus  Toe Exam: ROM and strength within normal limits   No swelling, no tenderness, no erythema and no left toe deformity  Sensory   Monofilament testing: intact    Vascular  Capillary refills: < 3 seconds  The left DP pulse is 2+  The left PT pulse is 2+       Assign Risk Category  No deformity present  No loss of protective sensation  No weak pulses  Risk: 0

## 2021-11-22 ENCOUNTER — OFFICE VISIT (OUTPATIENT)
Dept: UROLOGY | Facility: MEDICAL CENTER | Age: 61
End: 2021-11-22
Payer: COMMERCIAL

## 2021-11-22 VITALS
HEART RATE: 77 BPM | WEIGHT: 203 LBS | HEIGHT: 64 IN | SYSTOLIC BLOOD PRESSURE: 110 MMHG | BODY MASS INDEX: 34.66 KG/M2 | DIASTOLIC BLOOD PRESSURE: 80 MMHG

## 2021-11-22 DIAGNOSIS — R35.1 BENIGN PROSTATIC HYPERPLASIA WITH NOCTURIA: ICD-10-CM

## 2021-11-22 DIAGNOSIS — N40.1 BENIGN PROSTATIC HYPERPLASIA WITH NOCTURIA: ICD-10-CM

## 2021-11-22 DIAGNOSIS — Z12.5 PROSTATE CANCER SCREENING: ICD-10-CM

## 2021-11-22 DIAGNOSIS — R31.29 MICROHEMATURIA: Primary | ICD-10-CM

## 2021-11-22 LAB
BACTERIA UR QL AUTO: ABNORMAL /HPF
BILIRUB UR QL STRIP: NEGATIVE
CLARITY UR: CLEAR
COLOR UR: YELLOW
GLUCOSE UR STRIP-MCNC: ABNORMAL MG/DL
HGB UR QL STRIP.AUTO: NEGATIVE
HYALINE CASTS #/AREA URNS LPF: ABNORMAL /LPF
KETONES UR STRIP-MCNC: NEGATIVE MG/DL
LEUKOCYTE ESTERASE UR QL STRIP: NEGATIVE
NITRITE UR QL STRIP: NEGATIVE
NON-SQ EPI CELLS URNS QL MICRO: ABNORMAL /HPF
PH UR STRIP.AUTO: 6 [PH]
PROT UR STRIP-MCNC: ABNORMAL MG/DL
RBC #/AREA URNS AUTO: ABNORMAL /HPF
SL AMB  POCT GLUCOSE, UA: ABNORMAL
SL AMB LEUKOCYTE ESTERASE,UA: ABNORMAL
SL AMB POCT BILIRUBIN,UA: ABNORMAL
SL AMB POCT BLOOD,UA: ABNORMAL
SL AMB POCT CLARITY,UA: CLEAR
SL AMB POCT COLOR,UA: YELLOW
SL AMB POCT KETONES,UA: ABNORMAL
SL AMB POCT NITRITE,UA: ABNORMAL
SL AMB POCT PH,UA: 5.5
SL AMB POCT SPECIFIC GRAVITY,UA: 1.02
SL AMB POCT URINE PROTEIN: ABNORMAL
SL AMB POCT UROBILINOGEN: 0.2
SP GR UR STRIP.AUTO: 1.02 (ref 1–1.03)
UROBILINOGEN UR QL STRIP.AUTO: 0.2 E.U./DL
WBC #/AREA URNS AUTO: ABNORMAL /HPF

## 2021-11-22 PROCEDURE — 81003 URINALYSIS AUTO W/O SCOPE: CPT | Performed by: PHYSICIAN ASSISTANT

## 2021-11-22 PROCEDURE — 81001 URINALYSIS AUTO W/SCOPE: CPT | Performed by: PHYSICIAN ASSISTANT

## 2021-11-22 PROCEDURE — 99204 OFFICE O/P NEW MOD 45 MIN: CPT | Performed by: PHYSICIAN ASSISTANT

## 2021-11-22 RX ORDER — TAMSULOSIN HYDROCHLORIDE 0.4 MG/1
0.4 CAPSULE ORAL
Qty: 30 CAPSULE | Refills: 2 | Status: SHIPPED | OUTPATIENT
Start: 2021-11-22 | End: 2022-02-02

## 2021-11-23 ENCOUNTER — APPOINTMENT (OUTPATIENT)
Dept: LAB | Facility: HOSPITAL | Age: 61
End: 2021-11-23
Payer: COMMERCIAL

## 2021-11-23 ENCOUNTER — HOSPITAL ENCOUNTER (OUTPATIENT)
Dept: ULTRASOUND IMAGING | Facility: HOSPITAL | Age: 61
Discharge: HOME | End: 2021-11-23
Payer: COMMERCIAL

## 2021-11-23 DIAGNOSIS — Z12.5 PROSTATE CANCER SCREENING: ICD-10-CM

## 2021-11-23 DIAGNOSIS — R31.29 MICROHEMATURIA: ICD-10-CM

## 2021-11-23 LAB — PSA SERPL-MCNC: 0.9 NG/ML (ref 0–4)

## 2021-11-23 PROCEDURE — 36415 COLL VENOUS BLD VENIPUNCTURE: CPT

## 2021-11-23 PROCEDURE — 76770 US EXAM ABDO BACK WALL COMP: CPT

## 2021-11-23 PROCEDURE — G0103 PSA SCREENING: HCPCS

## 2022-01-03 DIAGNOSIS — J30.2 SEASONAL ALLERGIES: ICD-10-CM

## 2022-01-03 RX ORDER — LORATADINE 10 MG/1
TABLET ORAL
Qty: 90 TABLET | Refills: 0 | Status: SHIPPED | OUTPATIENT
Start: 2022-01-03

## 2022-01-26 ENCOUNTER — OFFICE VISIT (OUTPATIENT)
Dept: UROLOGY | Facility: MEDICAL CENTER | Age: 62
End: 2022-01-26
Payer: MEDICARE

## 2022-01-26 VITALS
HEIGHT: 64 IN | HEART RATE: 80 BPM | SYSTOLIC BLOOD PRESSURE: 110 MMHG | BODY MASS INDEX: 34.66 KG/M2 | DIASTOLIC BLOOD PRESSURE: 70 MMHG | WEIGHT: 203 LBS

## 2022-01-26 DIAGNOSIS — N40.1 BENIGN PROSTATIC HYPERPLASIA WITH NOCTURIA: Primary | ICD-10-CM

## 2022-01-26 DIAGNOSIS — R35.1 BENIGN PROSTATIC HYPERPLASIA WITH NOCTURIA: Primary | ICD-10-CM

## 2022-01-26 DIAGNOSIS — Z12.5 PROSTATE CANCER SCREENING: ICD-10-CM

## 2022-01-26 LAB
POST-VOID RESIDUAL VOLUME, ML POC: 15 ML
SL AMB  POCT GLUCOSE, UA: ABNORMAL
SL AMB LEUKOCYTE ESTERASE,UA: ABNORMAL
SL AMB POCT BILIRUBIN,UA: ABNORMAL
SL AMB POCT BLOOD,UA: ABNORMAL
SL AMB POCT CLARITY,UA: CLEAR
SL AMB POCT COLOR,UA: YELLOW
SL AMB POCT KETONES,UA: ABNORMAL
SL AMB POCT NITRITE,UA: ABNORMAL
SL AMB POCT PH,UA: 5
SL AMB POCT SPECIFIC GRAVITY,UA: 1.02
SL AMB POCT URINE PROTEIN: ABNORMAL
SL AMB POCT UROBILINOGEN: 0.2

## 2022-01-26 PROCEDURE — 51798 US URINE CAPACITY MEASURE: CPT | Performed by: PHYSICIAN ASSISTANT

## 2022-01-26 PROCEDURE — 81003 URINALYSIS AUTO W/O SCOPE: CPT | Performed by: PHYSICIAN ASSISTANT

## 2022-01-26 PROCEDURE — 99214 OFFICE O/P EST MOD 30 MIN: CPT | Performed by: PHYSICIAN ASSISTANT

## 2022-01-26 RX ORDER — TAMSULOSIN HYDROCHLORIDE 0.4 MG/1
0.4 CAPSULE ORAL
Qty: 30 CAPSULE | Refills: 11 | Status: SHIPPED | OUTPATIENT
Start: 2022-01-26

## 2022-01-26 NOTE — PROGRESS NOTES
1/26/2022      Chief Complaint   Patient presents with    Follow-up         Assessment and Plan    64 y o  male managed by our office     1  BPH with lower urinary tract   · Urine today: negative blood, positive glucose (on Jardiance)  · Notes significant improvement in urinary symptoms on Flomax  · Will continue  · Refill sent  · Follow up in November 2022 to resume annual visits  2  Microscopic hematuria  · Urine today negative  · Renal ultrasound normal   · Will keep cystoscopy appointment to complete microscopic hematuria workup  3  Prostate cancer screening   · PSA: 0 9 (11/23/21)  · No previous PSA testing  · PAUL on 11/22/21 was benign  · Proceed with routine annual screening in Nov 2023  · New PSA orders placed  History of Present Illness  Kamla Torres is a 64 y o  male here for evaluation of BPH, microscopic hematuria, and prostate cancer screening  He is accompanied today by his nephew for translation assistance  Patient was evaluated on 11/22/2021 as a new patient for the diagnoses listed above  His renal ultrasound was negative for microscopic hematuria workup  He is scheduled for cystoscopy on 02/02/2022 to complete microscopic hematuria workup  He was started on Flomax 0 4 mg p o  Daily for his BPH symptoms  He notes his nocturia has significantly improved, his stream strength is stronger, and he notices less postvoid dribbling  He denies any side effects from the medication  He denies any dysuria or gross hematuria  He denies any flank or abdominal pain  He denies any fevers or chills at home  He is agreeable plan above    Review of Systems   Constitutional: Negative for chills and fever  HENT: Negative  Respiratory: Negative  Cardiovascular: Negative  Gastrointestinal: Negative for abdominal pain, nausea and vomiting  Genitourinary: Negative for difficulty urinating, dysuria, flank pain, frequency, hematuria, scrotal swelling, testicular pain and urgency  Nocturia improved to once per night  Improved stream strength  Decrease in postvoid dribbling  Denies issues with incontinence or sensation of incomplete bladder emptying   Musculoskeletal: Positive for back pain (low chronic back pain)  Skin: Negative  Neurological: Negative  Vitals  Vitals:    01/26/22 1042   BP: 110/70   Pulse: 80   Weight: 92 1 kg (203 lb)   Height: 5' 4" (1 626 m)       Physical Exam  Vitals reviewed  Constitutional:       General: He is not in acute distress  Appearance: Normal appearance  He is obese  He is not ill-appearing, toxic-appearing or diaphoretic  HENT:      Head: Normocephalic and atraumatic  Eyes:      Conjunctiva/sclera: Conjunctivae normal    Pulmonary:      Effort: Pulmonary effort is normal  No respiratory distress  Abdominal:      General: There is no distension  Palpations: Abdomen is soft  Tenderness: There is no abdominal tenderness  There is no right CVA tenderness, left CVA tenderness, guarding or rebound  Musculoskeletal:         General: Normal range of motion  Cervical back: Normal range of motion  Skin:     General: Skin is warm and dry  Neurological:      General: No focal deficit present  Mental Status: He is alert and oriented to person, place, and time  Psychiatric:         Mood and Affect: Mood normal          Behavior: Behavior normal          Thought Content:  Thought content normal          Judgment: Judgment normal        Past History  Past Medical History:   Diagnosis Date    [facility]      Social History     Socioeconomic History    Marital status: Single     Spouse name: None    Number of children: None    Years of education: None    Highest education level: None   Occupational History    None   Tobacco Use    Smoking status: Former Smoker    Smokeless tobacco: Never Used   Vaping Use    Vaping Use: Never used   Substance and Sexual Activity    Alcohol use: No    Drug use: No    Sexual activity: Not Currently   Other Topics Concern    None   Social History Narrative    No Pent[employer]al beliefs    Primary spoken language Mandarin     Social Determinants of Health     Financial Resource Strain: Low Risk     Difficulty of Paying Living Expenses: Not hard at all   Food Insecurity: No Food Insecurity    Worried About Running Out of Food in the Last Year: Never true    Lauren of Food in the Last Year: Never true   Transportation Needs: No Transportation Needs    Lack of Transportation (Medical): No    Lack of Transportation (Non-Medical): No   Physical Activity: Not on file   Stress: Not on file   Social Connections: Not on file   Intimate Partner Violence: Not on file   Housing Stability: Not on file     Social History     Tobacco Use   Smoking Status Former Smoker   Smokeless Tobacco Never Used     No family history on file      The following portions of the patient's history were reviewed and updated as appropriate: allergies, current medications, past medical history, past social history, past surgical history and problem list     Results  No results found for this or any previous visit (from the past 1 hour(s)) ]  Lab Results   Component Value Date    PSA 0 9 11/23/2021     Lab Results   Component Value Date    GLUCOSE 223 (H) 01/14/2014    CALCIUM 8 6 07/07/2021     01/14/2014    K 3 6 07/07/2021    CO2 25 07/07/2021     (H) 07/07/2021    BUN 15 07/07/2021    CREATININE 0 85 07/07/2021     Lab Results   Component Value Date    WBC 8 09 07/07/2021    HGB 13 5 07/07/2021    HCT 41 9 07/07/2021    MCV 90 07/07/2021     07/07/2021     Anthony Trinidad PA-C

## 2022-02-02 ENCOUNTER — PROCEDURE VISIT (OUTPATIENT)
Dept: UROLOGY | Facility: MEDICAL CENTER | Age: 62
End: 2022-02-02
Payer: MEDICARE

## 2022-02-02 VITALS
HEIGHT: 64 IN | BODY MASS INDEX: 34.31 KG/M2 | DIASTOLIC BLOOD PRESSURE: 78 MMHG | HEART RATE: 87 BPM | SYSTOLIC BLOOD PRESSURE: 106 MMHG | WEIGHT: 201 LBS

## 2022-02-02 DIAGNOSIS — R35.1 BENIGN PROSTATIC HYPERPLASIA WITH NOCTURIA: ICD-10-CM

## 2022-02-02 DIAGNOSIS — R31.29 MICROHEMATURIA: Primary | ICD-10-CM

## 2022-02-02 DIAGNOSIS — N40.1 BENIGN PROSTATIC HYPERPLASIA WITH NOCTURIA: ICD-10-CM

## 2022-02-02 PROCEDURE — 52000 CYSTOURETHROSCOPY: CPT | Performed by: UROLOGY

## 2022-02-02 PROCEDURE — 99213 OFFICE O/P EST LOW 20 MIN: CPT | Performed by: UROLOGY

## 2022-02-02 NOTE — PROGRESS NOTES
HISTORY:    Evaluation for microscopic hematuria and BPH     Started tamsulosin last visit and is doing quite well with that  Much less frequency urgency, stronger flow  ASSESSMENT / PLAN:    Cysto today shows moderate BPH  No bladder lesions  Ultrasound was normal   Follow-up six months    The following portions of the patient's history were reviewed and updated as appropriate: allergies, current medications, past family history, past medical history, past social history, past surgical history and problem list     Review of Systems   All other systems reviewed and are negative  Objective:     Cystoscopy     Date/Time 2/2/2022 10:22 AM     Performed by  Simran Manriquez MD     Authorized by Simran Manriquez MD          Procedure Details:  Procedure type: cystoscopy    Patient tolerance: Patient tolerated the procedure well with no immediate complications    Additional Procedure Details:      Patient presents for cystoscopy  I have discussed the reasons for doing the test, and the potential risks and complications  Patient expressed understanding, and signed informed consent document  The patient was carefully  positioned supine on the examining table  Sterile preparation was performed on the urethra  Xylocaine jelly was instilled and left  Indwelling for the procedure  The 13 [place] flexible cystoscope was passed with the following findings:      Urethra:  No strictures    Prostate:  lateral lobes moderate enlargement, no real median lobe                   Bladder:  Mild trabeculations, no lesions, tumor, or stones  Residual urine:  Minimal    Patient tolerated the procedure well and was escorted from the examining table  Physical Exam  Constitutional:       General: He is not in acute distress  Appearance: He is well-developed  He is not diaphoretic  HENT:      Head: Normocephalic and atraumatic  Eyes:      General: No scleral icterus    Pulmonary:      Effort: Pulmonary effort is normal    Skin:     Coloration: Skin is not pale  Neurological:      Mental Status: He is alert and oriented to person, place, and time  Psychiatric:         Behavior: Behavior normal          Thought Content: Thought content normal          Judgment: Judgment normal              0   Lab Value Date/Time    PSA 0 9 11/23/2021 1000   ]  BUN   Date Value Ref Range Status   07/07/2021 15 5 - 25 mg/dL Final   01/14/2014 13 5 - 27 mg/dL Final     Creatinine   Date Value Ref Range Status   07/07/2021 0 85 0 60 - 1 30 mg/dL Final     Comment:     Standardized to IDMS reference method   01/14/2014 0 93 0 60 - 1 30 mg/dL Final     Comment:     Standardized to IDMS reference method     No components found for: CBC      Patient Active Problem List   Diagnosis    Mass of skin    Skin tags, multiple acquired    Type 2 diabetes mellitus without complication, without long-term current use of insulin (HCC)    Cholinergic urticaria    Subclinical thyrotoxicosis    Class 1 obesity due to excess calories with serious comorbidity and body mass index (BMI) of 33 0 to 33 9 in adult    Dizziness    Rash of genital area    Anal or rectal pain        There are no diagnoses linked to this encounter  Patient ID: Brandon Davenport is a 64 y o  male        Current Outpatient Medications:     Empagliflozin (Jardiance) 25 MG TABS, Take 1 tablet (25 mg total) by mouth daily, Disp: 90 tablet, Rfl: 1    loratadine (CLARITIN) 10 mg tablet, TAKE ONE TABLET BY MOUTH EVERY DAY, Disp: 90 tablet, Rfl: 0    metFORMIN (GLUCOPHAGE) 1000 MG tablet, Take 1 tablet (1,000 mg total) by mouth 2 (two) times a day, Disp: 180 tablet, Rfl: 1    Multiple Vitamins-Minerals (CENTRUM SILVER PO), Take 1 tablet by mouth daily, Disp: , Rfl:     Multiple Vitamins-Minerals (OCUVITE-LUTEIN PO), Take 1 each by mouth daily, Disp: , Rfl:     nystatin (MYCOSTATIN) cream, Apply topically 2 (two) times a day, Disp: 30 g, Rfl: 0    OneTouch Delica Lancets 16G MISC, Use, Disp: , Rfl:     tamsulosin (FLOMAX) 0 4 mg, Take 1 capsule (0 4 mg total) by mouth daily with dinner, Disp: 30 capsule, Rfl: 11    atorvastatin (LIPITOR) 40 mg tablet, TAKE ONE TABLET BY MOUTH EVERY DAY (Patient not taking: Reported on 2/2/2022), Disp: 90 tablet, Rfl: 3    meclizine (ANTIVERT) 12 5 MG tablet, Take 1 tablet (12 5 mg total) by mouth every 12 (twelve) hours as needed for dizziness (Patient not taking: Reported on 1/26/2022 ), Disp: 30 tablet, Rfl: 1    Multiple Vitamins-Minerals (OCUVITE ADULT 50+ PO), Take by mouth (Patient not taking: Reported on 2/2/2022 ), Disp: , Rfl:     tamsulosin (FLOMAX) 0 4 mg, Take 1 capsule (0 4 mg total) by mouth daily with dinner (Patient not taking: Reported on 2/2/2022 ), Disp: 30 capsule, Rfl: 2    Past Medical History:   Diagnosis Date    Diabetes mellitus ([facility] [address] )        History reviewed  No pertinent surgical history      Social History

## 2022-02-07 ENCOUNTER — OFFICE VISIT (OUTPATIENT)
Dept: GASTROENTEROLOGY | Facility: CLINIC | Age: 62
End: 2022-02-07
Payer: MEDICARE

## 2022-02-07 VITALS
WEIGHT: 204.1 LBS | BODY MASS INDEX: 35.03 KG/M2 | DIASTOLIC BLOOD PRESSURE: 67 MMHG | HEART RATE: 83 BPM | TEMPERATURE: 98.1 F | SYSTOLIC BLOOD PRESSURE: 105 MMHG

## 2022-02-07 DIAGNOSIS — Z12.11 SCREENING FOR COLON CANCER: ICD-10-CM

## 2022-02-07 PROCEDURE — 99243 OFF/OP CNSLTJ NEW/EST LOW 30: CPT | Performed by: STUDENT IN AN ORGANIZED HEALTH CARE EDUCATION/TRAINING PROGRAM

## 2022-02-07 NOTE — PROGRESS NOTES
[facility] - Outpatient Consultation  Brandi Sinha 64 y o  male MRN: [id number]  Encounter: [id number]    ASSESSMENT AND PLAN:    Brandi Sinha is a 64 y o  old male with PMH: DM 2, who presents for:     #Screening Colonoscopy  Patient to undergo age appropriate screening colonoscopy  Patient currently not on any blood thinners, antiplatelet agents  Patient is diabetic will try to do the colonoscopy earlier in the morning  Patient at average risk for colon cancer with no high risk features endorsed  Patient has never had any previous colon cancer screening (invasive or non-invasive)  Has tolerated conscious sedation well previously  Plan:  -Currently average risk for colonoscopy, other options for colorectal cancer screening were discussed with the patient, will perform colonoscopy, risk and benefits of the procedure were discussed with the patient including but not limited to bleeding, infection, perforation and missing an adenoma   -colonoscopy ordered today    #Improving Anal Pain  Patient with some anal pain on wiping tissue paper  Possibly hemorroids vs  Fissure  Will evaluate during screening colonoscopy though symptoms have improved  -High fiber diet  -Avoid constipation  -Ensure adequate hydration, at least 70oz of water daily    #Hepatitis C Screening  Hep C Ab negative on 1/20        ______________________________________________________________________    HPI:    Patient reffered by Jacquie Martinez MD      Patient presented in GI Clinic for colon cancer screening evaluation  Patient denies any hematemesis, melena, or hematochezia  Does not endorse any weight loss, recent N/V/F/C  Denies any change in bowel habits, no new constipation or diarrhea  Did have some anal pain with wiping about 8 months  No evidence of hemorrhoids on exam, but will take closer look during colonoscopy  Denies any blood within the caliber of stool    States that since the few episodes roughly 8 months ago he has had improvement in his symptoms and does not have any complaints at this time  Family history:  No significant family history of colorectal, gastric cancer or inflammatory bowel disease    Last EGD:  Never  Last colonoscopy:  Never    REVIEW OF SYSTEMS:    CONSTITUTIONAL: Denies any fever, chills, rigors, and weight loss  HEENT: No earache or tinnitus  Denies hearing loss or visual disturbances  CARDIOVASCULAR: No chest pain or palpitations  RESPIRATORY: Denies any cough, hemoptysis, shortness of breath or dyspnea on exertion  GASTROINTESTINAL: As noted in the History of Present Illness  GENITOURINARY: No problems with urination  Denies any hematuria or dysuria  NEUROLOGIC: No dizziness or vertigo, denies headaches  MUSCULOSKELETAL: Denies any muscle or joint pain  SKIN: Denies skin rashes or itching  ENDOCRINE: Denies excessive thirst  Denies intolerance to heat or cold  PSYCHOSOCIAL: Denies depression or anxiety  Denies any recent memory loss  Historical Information   Past Medical History:   Diagnosis Date    [facility]      No past surgical history on file  Social History   Social History     Substance and Sexual Activity   Alcohol Use No     Social History     Substance and Sexual Activity   Drug Use No     Social History     Tobacco Use   Smoking Status Former Smoker   Smokeless Tobacco Never Used     No family history on file      Meds/Allergies       Current Outpatient Medications:     atorvastatin (LIPITOR) 40 mg tablet    Empagliflozin (Jardiance) 25 MG TABS    loratadine (CLARITIN) 10 mg tablet    meclizine (ANTIVERT) 12 5 MG tablet    metFORMIN (GLUCOPHAGE) 1000 MG tablet    Multiple Vitamins-Minerals (CENTRUM SILVER PO)    Multiple Vitamins-Minerals (OCUVITE ADULT 50+ PO)    Multiple Vitamins-Minerals (OCUVITE-LUTEIN PO)    nystatin (MYCOSTATIN) cream    OneTouch Delica Lancets 06K MISC    tamsulosin (FLOMAX) 0 4 mg  No Known Allergies    Objective     There were no vitals taken for this visit  There is no height or weight on file to calculate BMI  PHYSICAL EXAM:      General Appearance:   Well-appearing male who is alert, cooperative, no distress   HEENT:   Normocephalic, atraumatic, anicteric   Neck:  Supple, symmetrical, trachea midline   Lungs:   Clear to auscultation bilaterally; no rales, rhonchi or wheezing; respirations unlabored    Heart:   Regular rate and rhythm; no murmur, rub, or gallop  Abdomen:   Soft, non-tender on palpation   Genitalia:   Deferred    Rectal:   Deferred    Extremities:  No cyanosis, clubbing or edema    Pulses:  2+ and symmetric    Skin:  No jaundice, rashes, or lesions    Lymph nodes:  No palpable cervical lymphadenopathy        Lab Results:   No visits with results within 1 Day(s) from this visit     Latest known visit with results is:   Office Visit on 01/26/2022   Component Date Value     COLOR,UA 01/26/2022 yellow     CLARITY,UA 01/26/2022 clear     SPECIFIC GRAVITY,UA 01/26/2022 1 020      PH,UA 01/26/2022 5 0     LEUKOCYTE ESTERASE,UA 01/26/2022 neg     NITRITE,UA 01/26/2022 neg     GLUCOSE, UA 01/26/2022 500 mg/dl     KETONES,UA 01/26/2022 neg     BILIRUBIN,UA 01/26/2022 neg     BLOOD,UA 01/26/2022 neg     POCT URINE PROTEIN 01/26/2022 neg     SL AMB POCT UROBILINOGEN 01/26/2022 0 2     POST-VOID RESIDUAL VOLUM* 01/26/2022 15        Radiology Results:   No results found     ---------------------------------------------------  Note Electronically Signed By:    MD Simran Munoz's Gastroenterology Fellow PGY-[id number]  PI #: [id number]

## 2022-02-07 NOTE — PATIENT INSTRUCTIONS
Scheduled date of colonoscopy (as of today): 05/16/2022  Physician performing colonoscopy: Dr Stephanie Cooper  Location of colonoscopy: [place] [address] [facility]  Bowel prep reviewed with patient: Miralax/magnesium citrate  Instructions reviewed with patient by: Randy Huerta MA  Clearances: none

## 2022-02-11 ENCOUNTER — OFFICE VISIT (OUTPATIENT)
Dept: FAMILY MEDICINE CLINIC | Facility: CLINIC | Age: 62
End: 2022-02-11

## 2022-02-11 ENCOUNTER — TELEPHONE (OUTPATIENT)
Dept: FAMILY MEDICINE CLINIC | Facility: CLINIC | Age: 62
End: 2022-02-11

## 2022-02-11 VITALS
BODY MASS INDEX: 35.02 KG/M2 | HEART RATE: 80 BPM | DIASTOLIC BLOOD PRESSURE: 54 MMHG | OXYGEN SATURATION: 98 % | TEMPERATURE: 97 F | RESPIRATION RATE: 19 BRPM | WEIGHT: 204 LBS | SYSTOLIC BLOOD PRESSURE: 90 MMHG

## 2022-02-11 DIAGNOSIS — E78.2 MIXED HYPERLIPIDEMIA: ICD-10-CM

## 2022-02-11 DIAGNOSIS — E11.9 TYPE 2 DIABETES MELLITUS WITHOUT COMPLICATION, WITHOUT LONG-TERM CURRENT USE OF INSULIN (HCC): Primary | ICD-10-CM

## 2022-02-11 LAB — SL AMB POCT HEMOGLOBIN AIC: 8.1 (ref ?–6.5)

## 2022-02-11 PROCEDURE — 83036 HEMOGLOBIN GLYCOSYLATED A1C: CPT | Performed by: FAMILY MEDICINE

## 2022-02-11 PROCEDURE — 99213 OFFICE O/P EST LOW 20 MIN: CPT | Performed by: FAMILY MEDICINE

## 2022-02-11 RX ORDER — ATORVASTATIN CALCIUM 40 MG/1
40 TABLET, FILM COATED ORAL DAILY
Qty: 90 TABLET | Refills: 1 | Status: SHIPPED | OUTPATIENT
Start: 2022-02-11

## 2022-02-11 RX ORDER — LANCETS
EACH MISCELLANEOUS
Qty: 100 EACH | Refills: 2 | Status: SHIPPED | OUTPATIENT
Start: 2022-02-11

## 2022-02-11 RX ORDER — BLOOD SUGAR DIAGNOSTIC
STRIP MISCELLANEOUS
Qty: 100 STRIP | Refills: 2 | Status: SHIPPED | OUTPATIENT
Start: 2022-02-11

## 2022-02-11 RX ORDER — GLIPIZIDE 5 MG/1
5 TABLET ORAL
Qty: 30 TABLET | Refills: 5 | Status: SHIPPED | OUTPATIENT
Start: 2022-02-11 | End: 2022-06-03 | Stop reason: ALTCHOICE

## 2022-02-11 RX ORDER — BLOOD-GLUCOSE METER
EACH MISCELLANEOUS 2 TIMES DAILY
Qty: 1 KIT | Refills: 0 | Status: SHIPPED | OUTPATIENT
Start: 2022-02-11

## 2022-02-11 NOTE — TELEPHONE ENCOUNTER
Pharmacy call stating a new script needs to be sent out with directions for the pt Lancets and one touch verio  Pharmacy states insurance need to know how many times a day pt in using it in order for it to be covered by insurance

## 2022-02-11 NOTE — ASSESSMENT & PLAN NOTE
Lab Results   Component Value Date    HGBA1C 7 8 (A) 11/12/2021   Assessment:   · A1C at todays visit 8 1%  · Goal less than 7%, with FBG between 7-130 mg/d and 2 hr postprandial glucose of less than 180 mg/dl, poorly controlled  · Currently on Metformin 1000 mg b i d  and Jardiance 25 mg daily, compliant  · Blood pressure controlled and lipid levels controled, on statin    Plan:   · Will continue current treatment, and will add glipizide 5 mg daily  · A1C goals reviewed with patient  · The patient was educated on the importance of medication compliance and to monitor blood glucose at home on a daily basis  · Encourage life style changes including diabetes diet and exercise  · Will repeat a1c every  3 months until goal is reached, then q6 months  · Repeat foot exam in  At next visit  · Monitor log blood glucose levels at home on daily basis for further review at next office visit with labs  · Will f/u in 2 wks with blood sugars logs

## 2022-02-11 NOTE — ASSESSMENT & PLAN NOTE
Assessment:   BMI35 02, obese  Goal: BMI between 18 5 to 25  Weight increase since last visit 3 pounds    Plan  Encourage the patient to loose at least 5 pounds before next visit  Counseled patient on the importance of working to achieve weight reduction goal  Discussed benefits of weight loss including prevention or control of comorbidities  Discussed role that balanced diet and daily activity play in weight reduction  Set up small attainable weight loss goals  Involve family, friends, and co-workers for support  Exercise should be 30 minutes 5 times weekly of moderate intensity activity, brisk walking acceptable  Recommended diet include mediterranean and DASH  Primary focus should be unprocessed foods, fresh fruits &  vegetables, plant based fats and protein, legumes, whole grain and nuts  Vegetables and fruit should make up 1/2 each meal  Limit red meats, fast food and eating out at restaurants  Reassess in 3 months

## 2022-02-11 NOTE — PATIENT INSTRUCTIONS
How to Check your Blood Sugar   WHAT YOU NEED TO KNOW:   Your healthcare provider will tell you when and how often to check your blood sugar levels during the day  Your provider may use the results to make changes to your medicine, food, or exercise schedules  DISCHARGE INSTRUCTIONS:   Have someone call 911 for any of the following:   · You cannot be woken  · You have chest pain or shortness of breath  Return to the emergency department if:   · You have a low blood sugar level and it does not improve with treatment  · Your blood sugar level is above 240 mg/dL and does not come down after you take a dose of insulin  · You have ketones in your blood or urine  · You have blurred or double vision  · Your breath has a fruity, sweet smell, or your breathing is shallow  · You have symptoms of a low blood sugar level, such as trouble thinking, sweating, or a pounding heartbeat  Contact your healthcare provider if:   · Your blood sugar levels are higher than your target goals  · You often have low blood sugar levels  · You have trouble coping with your illness or you feel anxious or depressed  · You have questions or concerns about your condition or care  How to check your blood sugar: You will be taught how to check a small drop of blood with a glucose meter  Get all of your supplies together, such as your meter, test strips, and lancet device  Be sure to read the information that came with your meter  Use the following steps as a guide:  · Wash your hands  Use warm water to help blood flow  Dry your hands completely  · Put the test strip in the meter  This will turn on your meter  · Use the lancing device to stick your finger or area  Do not use the same finger or area every time  Stick the side of your fingertip, not the middle  The side may cause less pain  · Squeeze your fingertip or area gently  · Touch the drop of blood to the test strip   A number will appear on the meter after a few seconds  This is your blood sugar level  ·        When to check your blood sugar level:   · If you check your blood sugar level before a meal , it will show your lowest blood sugar  If you check your blood sugar level 2 hours after a meal , it will show your highest blood sugar  Ask your healthcare provider what good goals are for your blood sugar levels at different times  · You may need to check for ketones in your urine or blood if your blood sugar level is higher than directed  Follow up with your healthcare provider as directed:  Write down your results and show them to your healthcare provider at every visit  Also, write down your questions so you remember to ask them during your visits  © Copyright [employer] 2021 Information is for End User's use only and may not be sold, redistributed or otherwise used for commercial purposes  All illustrations and images included in CareNotes® are the copyrighted property of A "[employer] A M [employer]  or [employer]   The above information is an  only  It is not intended as medical advice for individual conditions or treatments  Talk to your doctor, nurse or pharmacist before following any medical regimen to see if it is safe and effective for you

## 2022-02-11 NOTE — PROGRESS NOTES
Assessment/Plan:    Type 2 diabetes mellitus without complication, without long-term current use of insulin ([facility])    Lab Results   Component Value Date    HGBA1C 7 8 (A) 11/12/2021   Assessment:   · A1C at todays visit 8 1%  · Goal less than 7%, with FBG between 7-130 mg/d and 2 hr postprandial glucose of less than 180 mg/dl, poorly controlled  · Currently on Metformin 1000 mg b i d  and Jardiance 25 mg daily, compliant  · Blood pressure controlled and lipid levels controled, on statin    Plan:   · Will continue current treatment, and will add glipizide 5 mg daily  · A1C goals reviewed with patient  · The patient was educated on the importance of medication compliance and to monitor blood glucose at home on a daily basis  · Encourage life style changes including diabetes diet and exercise  · Will repeat a1c every  3 months until goal is reached, then q6 months  · Repeat foot exam in  At next visit  · Monitor log blood glucose levels at home on daily basis for further review at next office visit with labs  · Will f/u in 2 wks with blood sugars logs  Class 1 obesity due to excess calories with serious comorbidity and body mass index (BMI) of 33 0 to 33 9 in adult  Assessment:   BMI35 02, obese  Goal: BMI between 18 5 to 25  Weight increase since last visit 3 pounds    Plan  Encourage the patient to loose at least 5 pounds before next visit  Counseled patient on the importance of working to achieve weight reduction goal  Discussed benefits of weight loss including prevention or control of comorbidities  Discussed role that balanced diet and daily activity play in weight reduction  Set up small attainable weight loss goals  Involve family, friends, and co-workers for support  Exercise should be 30 minutes 5 times weekly of moderate intensity activity, brisk walking acceptable  Recommended diet include mediterranean and DASH   Primary focus should be unprocessed foods, fresh fruits &  vegetables, plant based fats and protein, legumes, whole grain and nuts  Vegetables and fruit should make up 1/2 each meal  Limit red meats, fast food and eating out at restaurants  Reassess in 3 months  Diagnoses and all orders for this visit:    Type 2 diabetes mellitus without complication, without long-term current use of insulin (HCC)  -     POCT hemoglobin A1c  -     Empagliflozin (Jardiance) 25 MG TABS; Take 1 tablet (25 mg total) by mouth daily  -     metFORMIN (GLUCOPHAGE) 1000 MG tablet; Take 1 tablet (1,000 mg total) by mouth 2 (two) times a day  -     glipiZIDE (GLUCOTROL) 5 mg tablet; Take 1 tablet (5 mg total) by mouth daily with breakfast  -     Blood Glucose Monitoring Suppl (OneTouch Verio) w/Device KIT; Use 2 (two) times a day  -     glucose blood (OneTouch Verio) test strip; Use as instructed  -     Lancets (onetouch ultrasoft) lancets; Use as instructed    Mixed hyperlipidemia  -     atorvastatin (LIPITOR) 40 mg tablet; Take 1 tablet (40 mg total) by mouth daily          Subjective:      Patient ID: Kayley Burrell is a 64 y o  male  Kayley Burrell is a 64 y o   male with PMH significant for DM type 2, obesity and urticaria, who presents for f/u of his DM and BMI  At his,  last visit on 11/21, his A1c was 7 8%  He is currently on metformin 1000 mg b i d  Jardiance 25 mg once per day  His last BMI was 35 03, and he is on Lipitor 40 mg daily  Today he denies fatigue, headaches, dizziness, blurred vision, nausea, palpitation, chest pain, SOB, urinary changes, weakness, bowel changes, sleep problems,  sick contacts or recent travel  Patient's medical conditions are stable unless noted otherwise above   Patient has not had any recent hospitalizations, or medical emergencies since last visit  Patient reports being compliant with his medications  Last office visit was on 11/21  Overall patient reports feeling well, Reports a recent episode of palpitation and dizziness today's ago that has resolved since    When asking about his daily blood sugar levels his says that she is unable to take the readings because the machine is not working properly  Patient has no further complaints other than what is mentioned in the ROS  The following portions of the patient's history were reviewed and updated as appropriate: allergies, current medications, past family history, past medical history, past social history, past surgical history and problem list     Review of Systems   Constitutional: Negative for activity change, appetite change, chills, fatigue and fever  HENT: Negative for congestion, postnasal drip, rhinorrhea and sore throat  Eyes: Negative for visual disturbance  Respiratory: Negative for cough, chest tightness, shortness of breath and wheezing  Cardiovascular: Negative for chest pain, palpitations and leg swelling  Gastrointestinal: Negative for abdominal distention, abdominal pain, blood in stool, constipation, diarrhea, nausea and vomiting  Genitourinary: Negative for difficulty urinating, dysuria and hematuria  Musculoskeletal: Negative for arthralgias and myalgias  Skin: Negative for rash  Neurological: Negative for dizziness, syncope, weakness, light-headedness, numbness and headaches  Psychiatric/Behavioral: Negative for agitation, behavioral problems, self-injury, sleep disturbance and suicidal ideas  Objective:      BP 90/54 (BP Location: Left arm, Patient Position: Sitting, Cuff Size: Adult)   Pulse 80   Temp (!) 97 °F (36 1 °C) (Temporal)   Resp 19   Wt 92 5 kg (204 lb)   SpO2 98%   BMI 35 02 kg/m²          Physical Exam  Vitals and nursing note reviewed  Constitutional:       General: He is not in acute distress  Appearance: He is well-developed  He is not ill-appearing, toxic-appearing or diaphoretic  HENT:      Head: Normocephalic and atraumatic  Eyes:      General: No scleral icterus  Extraocular Movements: Extraocular movements intact     Cardiovascular: Rate and Rhythm: Normal rate and regular rhythm  No extrasystoles are present  Pulses:           Radial pulses are 2+ on the right side and 2+ on the left side  Heart sounds: Normal heart sounds, S1 normal and S2 normal  No murmur heard  No friction rub  No gallop  Pulmonary:      Effort: Pulmonary effort is normal  No respiratory distress  Breath sounds: Normal breath sounds and air entry  Abdominal:      General: Bowel sounds are normal       Palpations: Abdomen is soft  There is no mass  Tenderness: There is no abdominal tenderness  There is no right CVA tenderness, left CVA tenderness, guarding or rebound  Musculoskeletal:         General: No swelling, tenderness, deformity or signs of injury  Normal range of motion  Cervical back: Normal range of motion  Right lower leg: No edema  Left lower leg: No edema  Feet:      Right foot:      Skin integrity: No ulcer, skin breakdown, erythema, warmth, callus or dry skin  Left foot:      Skin integrity: No ulcer, skin breakdown, erythema, warmth, callus or dry skin  Skin:     General: Skin is warm  Findings: No rash  Neurological:      General: No focal deficit present  Mental Status: He is alert

## 2022-03-02 ENCOUNTER — OFFICE VISIT (OUTPATIENT)
Dept: FAMILY MEDICINE CLINIC | Facility: CLINIC | Age: 62
End: 2022-03-02

## 2022-03-02 VITALS
BODY MASS INDEX: 34.84 KG/M2 | HEART RATE: 79 BPM | WEIGHT: 203 LBS | OXYGEN SATURATION: 99 % | TEMPERATURE: 98.1 F | RESPIRATION RATE: 19 BRPM | DIASTOLIC BLOOD PRESSURE: 60 MMHG | SYSTOLIC BLOOD PRESSURE: 110 MMHG

## 2022-03-02 DIAGNOSIS — E11.9 TYPE 2 DIABETES MELLITUS WITHOUT COMPLICATION, WITHOUT LONG-TERM CURRENT USE OF INSULIN (HCC): Primary | ICD-10-CM

## 2022-03-02 PROCEDURE — 99213 OFFICE O/P EST LOW 20 MIN: CPT | Performed by: FAMILY MEDICINE

## 2022-03-02 NOTE — ASSESSMENT & PLAN NOTE
Lab Results   Component Value Date    HGBA1C 8 1 (A) 02/11/2022     Uncontrolled;  Currently Using Metformin 1000 mg BID, Jardiance 25 mg and Newly added Glipizide 5 mg daily with meal   BG logs are not accurate as there seems to be an error message  MA educated patient regarding appropriate use of the monitor   - Continue current medications  - To simplify for patient, checking BG daily or every other day alternating between FBG and 2HR post prandial   - Fasting BG goal and 2HR post prandial goal discussed with patient  - If 2HR post prandial is above 180, patient may take Glipizide twice daily with meals

## 2022-03-02 NOTE — PROGRESS NOTES
Assessment/Plan:    Type 2 diabetes mellitus without complication, without long-term current use of insulin (HCC)    Lab Results   Component Value Date    HGBA1C 8 1 (A) 02/11/2022     Uncontrolled;  Currently Using Metformin 1000 mg BID, Jardiance 25 mg and Newly added Glipizide 5 mg daily with meal   BG logs are not accurate as there seems to be an error message  MA educated patient regarding appropriate use of the monitor   - Continue current medications  - To simplify for patient, checking BG daily or every other day alternating between FBG and 2HR post prandial   - Fasting BG goal and 2HR post prandial goal discussed with patient  - If 2HR post prandial is above 180, patient may take Glipizide twice daily with meals  Diagnoses and all orders for this visit:    Type 2 diabetes mellitus without complication, without long-term current use of insulin ([facility])          Subjective:      Patient ID: Samaria Kim is a 64 y o  male  Patient is a very pleasant 17-year-old male who presents to the clinic for follow-up on blood glucose logs after he had increase in hemoglobin A1c at last office visit  Patient was started on glipizide 5 mg daily and has to check his fasting blood glucose as well as 2 hour postprandial however due to incorrect use of the blood glucose monitor, he did not have any accurate records  He has no symptoms today or complaints  The following portions of the patient's history were reviewed and updated as appropriate:   He  has a past medical history of Diabetes mellitus ([facility] [address] )    He   Patient Active Problem List    Diagnosis Date Noted    Rash of genital area 10/04/2021    Anal or rectal pain 10/04/2021    Dizziness 06/25/2021    Class 1 obesity due to excess calories with serious comorbidity and body mass index (BMI) of 33 0 to 33 9 in adult 06/24/2021    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    Subclinical thyrotoxicosis 03/23/2021    Mass of skin 10/22/2018    Skin tags, multiple acquired 10/22/2018    Type 2 diabetes mellitus without complication, without long-term current use of insulin ([facility] [address] ) 10/22/2018     He  has no past surgical history on file  His family history is not on file  He  reports that he has quit smoking  He has never used smokeless tobacco  He reports that he does not drink alcohol and does not use drugs  Current Outpatient Medications   Medication Sig Dispense Refill    atorvastatin (LIPITOR) 40 mg tablet Take 1 tablet (40 mg total) by mouth daily 90 tablet 1    Blood Glucose Monitoring Suppl (OneTouch Verio) w/Device KIT Use 2 (two) times a day 1 kit 0    Empagliflozin (Jardiance) 25 MG TABS Take 1 tablet (25 mg total) by mouth daily 90 tablet 1    glipiZIDE (GLUCOTROL) 5 mg tablet Take 1 tablet (5 mg total) by mouth daily with breakfast 30 tablet 5    glucose blood (OneTouch Verio) test strip Use as instructed 100 strip 2    Lancets (onetouch ultrasoft) lancets Use as instructed 100 each 2    loratadine (CLARITIN) 10 mg tablet TAKE ONE TABLET BY MOUTH EVERY DAY 90 tablet 0    meclizine (ANTIVERT) 12 5 MG tablet Take 1 tablet (12 5 mg total) by mouth every 12 (twelve) hours as needed for dizziness (Patient not taking: Reported on 1/26/2022 ) 30 tablet 1    metFORMIN (GLUCOPHAGE) 1000 MG tablet Take 1 tablet (1,000 mg total) by mouth 2 (two) times a day 180 tablet 1    Multiple Vitamins-Minerals (CENTRUM SILVER PO) Take 1 tablet by mouth daily      Multiple Vitamins-Minerals (OCUVITE ADULT 50+ PO) Take by mouth (Patient not taking: Reported on 2/2/2022 )      Multiple Vitamins-Minerals (OCUVITE-LUTEIN PO) Take 1 each by mouth daily      nystatin (MYCOSTATIN) cream Apply topically 2 (two) times a day 30 g 0    tamsulosin (FLOMAX) 0 4 mg Take 1 capsule (0 4 mg total) by mouth daily with dinner 30 capsule 11     No current facility-administered medications for this visit       Current Outpatient Medications on File Prior to Visit   Medication Sig    atorvastatin (LIPITOR) 40 mg tablet Take 1 tablet (40 mg total) by mouth daily    Blood Glucose Monitoring Suppl (OneTouch Verio) w/Device KIT Use 2 (two) times a day    Empagliflozin (Jardiance) 25 MG TABS Take 1 tablet (25 mg total) by mouth daily    glipiZIDE (GLUCOTROL) 5 mg tablet Take 1 tablet (5 mg total) by mouth daily with breakfast    glucose blood (OneTouch Verio) test strip Use as instructed    Lancets (onetouch ultrasoft) lancets Use as instructed    loratadine (CLARITIN) 10 mg tablet TAKE ONE TABLET BY MOUTH EVERY DAY    meclizine (ANTIVERT) 12 5 MG tablet Take 1 tablet (12 5 mg total) by mouth every 12 (twelve) hours as needed for dizziness (Patient not taking: Reported on 1/26/2022 )    metFORMIN (GLUCOPHAGE) 1000 MG tablet Take 1 tablet (1,000 mg total) by mouth 2 (two) times a day    Multiple Vitamins-Minerals (CENTRUM SILVER PO) Take 1 tablet by mouth daily    Multiple Vitamins-Minerals (OCUVITE ADULT 50+ PO) Take by mouth (Patient not taking: Reported on 2/2/2022 )    Multiple Vitamins-Minerals (OCUVITE-LUTEIN PO) Take 1 each by mouth daily    nystatin (MYCOSTATIN) cream Apply topically 2 (two) times a day    tamsulosin (FLOMAX) 0 4 mg Take 1 capsule (0 4 mg total) by mouth daily with dinner     No current facility-administered medications on file prior to visit  He has No Known Allergies       Review of Systems   Constitutional: Negative for fatigue  HENT: Negative for congestion, rhinorrhea and sore throat  Eyes: Negative for visual disturbance  Respiratory: Negative for cough, shortness of breath and wheezing  Cardiovascular: Negative for chest pain  Gastrointestinal: Negative for abdominal distention, abdominal pain, blood in stool, constipation, diarrhea, nausea and vomiting  Genitourinary: Negative for difficulty urinating and dysuria  Musculoskeletal: Negative for arthralgias  Skin: Negative for rash  Neurological: Negative for dizziness and headaches  Objective:      /60 (BP Location: Left arm, Patient Position: Sitting, Cuff Size: Adult)   Pulse 79   Temp 98 1 °F (36 7 °C) (Temporal)   Resp 19   Wt 92 1 kg (203 lb)   SpO2 99%   BMI 34 84 kg/m²          Physical Exam  Constitutional:       General: He is not in acute distress  Appearance: Normal appearance  He is obese  He is not ill-appearing, toxic-appearing or diaphoretic  HENT:      Head: Normocephalic and atraumatic  Right Ear: Tympanic membrane, ear canal and external ear normal  There is no impacted cerumen  Left Ear: Tympanic membrane, ear canal and external ear normal  There is no impacted cerumen  Nose: Nose normal  No congestion or rhinorrhea  Mouth/Throat:      Mouth: Mucous membranes are moist       Pharynx: Oropharynx is clear  No oropharyngeal exudate or posterior oropharyngeal erythema  Eyes:      General: No scleral icterus  Right eye: No discharge  Left eye: No discharge  Extraocular Movements: Extraocular movements intact  Conjunctiva/sclera: Conjunctivae normal    Cardiovascular:      Rate and Rhythm: Normal rate and regular rhythm  Pulses: Normal pulses  Heart sounds: No murmur heard  Pulmonary:      Effort: Pulmonary effort is normal  No respiratory distress  Breath sounds: Normal breath sounds  No wheezing  Abdominal:      General: Abdomen is flat  Bowel sounds are normal  There is no distension  Tenderness: There is no abdominal tenderness  Musculoskeletal:         General: Normal range of motion  Cervical back: Normal range of motion and neck supple  No rigidity or tenderness  Right lower leg: No edema  Left lower leg: No edema  Lymphadenopathy:      Cervical: No cervical adenopathy  Skin:     General: Skin is warm  Neurological:      General: No focal deficit present  Mental Status: He is alert     Psychiatric:         Mood and Affect: Mood normal

## 2022-03-07 ENCOUNTER — OFFICE VISIT (OUTPATIENT)
Dept: DENTISTRY | Facility: CLINIC | Age: 62
End: 2022-03-07

## 2022-03-07 VITALS — HEART RATE: 96 BPM | TEMPERATURE: 99.1 F | SYSTOLIC BLOOD PRESSURE: 112 MMHG | DIASTOLIC BLOOD PRESSURE: 70 MMHG

## 2022-03-07 DIAGNOSIS — Z01.21 ENCOUNTER FOR DENTAL EXAMINATION AND CLEANING WITH ABNORMAL FINDINGS: Primary | ICD-10-CM

## 2022-03-07 PROCEDURE — D0150 COMPREHENSIVE ORAL EVALUATION - NEW OR ESTABLISHED PATIENT: HCPCS | Performed by: DENTAL HYGIENIST

## 2022-03-07 PROCEDURE — D0210 INTRAORAL - COMPLETE SERIES OF RADIOGRAPHIC IMAGES: HCPCS | Performed by: DENTAL HYGIENIST

## 2022-03-07 NOTE — PROGRESS NOTES
New Patient Exam     Exams:  Comprehensive exam   Xrays:     FMX   Type of Treatment:     Reviewed OHI  Brush:  2X/day and Floss 1X/day  EO/OCS Exams:  No significant findings  IO: No significant findings  Oral Hygiene:  Poor  Plaque:  Heavy  Calculus:   Heavy sub and supra  Bleeding:   Heavy  Gingiva:   Spongy /  Inflamed  Stain:  Light    Perio Charting:  Periocharting was not completed  Too much debris  Overall - Pt has not had a cleaning in about 15 yrs  Pt has severe bone loss - the only thing holding the teeth in place is the bridgework which pt states was done in [place] about 2003  Explained periodontal disease to pt via  and how his teeth are not savable  Pt states he is not in pain eat the moment  Recommendation is:  Extraction's of the remaining upper and lower teeth and complete dentures  Perio Findings:  Chronic Severe Generalized Periodontal Disease - Stage 4  Caries Findings:  Decay evident under bridges  PARL #5  Caries Risk Assessment:   Moderate caries risk    Treatment Plan:  Updated    Dr  Exam:  Dr Bud Smith  Referral:  No referral given   Nv1:  Ext's Maxillary teeth - 90 min  Nv2:  Ext's Mack  teeth  - 90 min     Need to pre-auth for CD's then he will need ext's

## 2022-03-21 NOTE — ASSESSMENT & PLAN NOTE
Assessment:   · Last a1c from 9/2021 6 8, controlled  · Goal less than 7%, with FBG between 7-130 mg/d and 2 hr postprandial glucose of less than 180 mg/dl, poorly controlled  · Currently on metformin 1000 mg bid and jardiance 25 mg,  compliant  · Follows with endocrinology  · UA + for microalbuminuria on lisinopril 2 5 mg daily  · DM foot exam done today  Plan:   · Continue current treatment  · Will reassess A1C at next visit  · A1C goals reviewed with patient  · The patient was educated on the importance of medication compliance and to monitor blood glucose at home on a daily basis  · Continue to f/u with endocrinology   · Encourage life style changes including diabetes diet and exercise  · Will repeat a1c every  3 months, then q6 months  · Repeat foot exam yearly   · Follow up in 3 months

## 2022-03-21 NOTE — ASSESSMENT & PLAN NOTE
Assessment:   Controlled with meclizine 25 mg prn q12h  Echo and EKG were wnl   Lisinopril decreased at his last office visit from 5 to 2 5 mg  Unremarkable neuro exam, no recent URI        Plan  Continue to monitor  Continue Meclizine prn   Consider ENT and vestibular therapy with symptoms continue to persist    Reassess in 3 months

## 2022-04-22 ENCOUNTER — OFFICE VISIT (OUTPATIENT)
Dept: URGENT CARE | Age: 62
End: 2022-04-22
Payer: MEDICARE

## 2022-04-22 VITALS
HEART RATE: 93 BPM | TEMPERATURE: 96.4 F | OXYGEN SATURATION: 96 % | RESPIRATION RATE: 18 BRPM | BODY MASS INDEX: 34.84 KG/M2 | WEIGHT: 203 LBS

## 2022-04-22 DIAGNOSIS — K05.219 GUM ABSCESS: Primary | ICD-10-CM

## 2022-04-22 DIAGNOSIS — K04.7 DENTAL INFECTION: ICD-10-CM

## 2022-04-22 PROCEDURE — 99213 OFFICE O/P EST LOW 20 MIN: CPT

## 2022-04-22 RX ORDER — AMOXICILLIN AND CLAVULANATE POTASSIUM 875; 125 MG/1; MG/1
1 TABLET, FILM COATED ORAL EVERY 12 HOURS SCHEDULED
Qty: 20 TABLET | Refills: 0 | Status: SHIPPED | OUTPATIENT
Start: 2022-04-22 | End: 2022-05-02

## 2022-04-22 NOTE — PATIENT INSTRUCTIONS
Appointment on Tuesday April 26, 2022  At [address] with Cheryl Level [facility] at [address] [facility],[address], [address]            ???   ??????   ??? ????????????????????????????????????????????????????????????????????????????????????????????????????        ??????  · ?????????????????????    · ??????????    · ??    · ????????????????????    · ????????    · ??????    ???????????????  · ??????????    · ????????????    ???????????????????  · ????????????????    · ?????    · ???????????????    · ?????    · ?????????    · ????????????????    ????? ? ?? ???????  · ?? ??????????????    · ???? ????????????????????????????????????????????????????????????    · ???? ?????????????????????????????????????????????    · ?? ????????????????????????????    ?????  · ?? 2 ???????? ????????????    · ??????????????? ????????????    · ?????????? ??????????????????????????????????????????    · ???????? ??????????????10 ???????????????? 3 ??    ???????  · ?????????????? ?    · ???????????????? ?    · ?????????????? ?????????    · ????????????????????????? ??????????????????    · ? 6 ???????? ????????????    ???????????????? ???????????????????????????????????  © Copyright [employer] 2022 Information is for End User's use only and may not be sold, redistributed or otherwise used for commercial purposes  All illustrations and images included in CareNotes® are the copyrighted property of [employer]  or [employer]  The above information is an  only  It is not intended as medical advice for individual conditions or treatments  Talk to your doctor, nurse or pharmacist before following any medical regimen to see if it is safe and effective for you

## 2022-04-22 NOTE — PROGRESS NOTES
[facility]        NAME: Ketan Pulliam is a 64 y o  male  : 1960    MRN: [id number]  DATE: 2022  TIME: 12:32 PM    Assessment and Plan   Gum abscess [K05 [id number]]  1  Gum abscess  amoxicillin-clavulanate (AUGMENTIN) 875-125 mg per tablet    Ambulatory Referral to Oral Maxillofacial Surgery   2  Dental infection       Patient states ( via son translating), 3 days ago started with upper left dental pain   Now has left facial swelling  Has been using Anbesol and taking tylenol  Tylenol relieved pain for a little however returned  Patient has implanted teeth that were put in  in [place]  Upon assessment large abscess with bruising noted to upper right gum line- right facial swelling present  Due to concern for implant infection and progressive welling, [facility] was called and appointment made  Pt unable to go to todays first available  Will f/u on  at 1540  Will order Augmentin and advised to take tylenol/motrin for discomfort  Patient Instructions     Take antibiotic as prescribed  Follow up with [facility]  Proceed to ER if worsening swelling/other symptoms    Chief Complaint     Chief Complaint   Patient presents with    Dental Pain     Patient c/o L side facial swelling with jaw pain and dental pain x 3 days          History of Present Illness       Patient states ( via son translating), 3 days ago started with upper left dental pain   Now has left facial swelling  Has been using Anbesol and taking tylenol  Tylenol relieved pain for a little however returned  Patient has implanted teeth that were put in  in [place]  Dental Pain   This is a new problem  The current episode started in the past 7 days  The problem occurs constantly  The problem has been unchanged  The pain is at a severity of 6/10  The pain is moderate  Associated symptoms include facial pain  Pertinent negatives include no difficulty swallowing, fever, oral bleeding, sinus pressure or thermal sensitivity   He has tried acetaminophen for the symptoms  The treatment provided moderate relief  Review of Systems   Review of Systems   Constitutional: Negative for chills and fever  HENT: Positive for dental problem and facial swelling  Negative for congestion, ear pain, postnasal drip, sinus pressure, sinus pain and sore throat  Eyes: Negative for pain and itching  Respiratory: Negative for cough, shortness of breath and wheezing  Cardiovascular: Negative for chest pain and palpitations  Gastrointestinal: Negative for abdominal pain, constipation, diarrhea, nausea and vomiting  Genitourinary: Negative for difficulty urinating and hematuria  Musculoskeletal: Negative for arthralgias and myalgias  Skin: Negative for rash  Neurological: Negative for dizziness, light-headedness and headaches  Psychiatric/Behavioral: Negative for agitation and sleep disturbance  The patient is not nervous/anxious            Current Medications       Current Outpatient Medications:     amoxicillin-clavulanate (AUGMENTIN) 875-125 mg per tablet, Take 1 tablet by mouth every 12 (twelve) hours for 10 days, Disp: 20 tablet, Rfl: 0    atorvastatin (LIPITOR) 40 mg tablet, Take 1 tablet (40 mg total) by mouth daily, Disp: 90 tablet, Rfl: 1    Blood Glucose Monitoring Suppl (OneTouch Verio) w/Device KIT, Use 2 (two) times a day, Disp: 1 kit, Rfl: 0    Empagliflozin (Jardiance) 25 MG TABS, Take 1 tablet (25 mg total) by mouth daily, Disp: 90 tablet, Rfl: 1    glipiZIDE (GLUCOTROL) 5 mg tablet, Take 1 tablet (5 mg total) by mouth daily with breakfast, Disp: 30 tablet, Rfl: 5    glucose blood (OneTouch Verio) test strip, Use as instructed, Disp: 100 strip, Rfl: 2    Lancets (onetouch ultrasoft) lancets, Use as instructed, Disp: 100 each, Rfl: 2    loratadine (CLARITIN) 10 mg tablet, TAKE ONE TABLET BY MOUTH EVERY DAY, Disp: 90 tablet, Rfl: 0    meclizine (ANTIVERT) 12 5 MG tablet, Take 1 tablet (12 5 mg total) by mouth every 12 (twelve) hours as needed for dizziness (Patient not taking: Reported on 1/26/2022 ), Disp: 30 tablet, Rfl: 1    metFORMIN (GLUCOPHAGE) 1000 MG tablet, Take 1 tablet (1,000 mg total) by mouth 2 (two) times a day, Disp: 180 tablet, Rfl: 1    Multiple Vitamins-Minerals (CENTRUM SILVER PO), Take 1 tablet by mouth daily, Disp: , Rfl:     Multiple Vitamins-Minerals (OCUVITE ADULT 50+ PO), Take by mouth (Patient not taking: Reported on 2/2/2022 ), Disp: , Rfl:     Multiple Vitamins-Minerals (OCUVITE-LUTEIN PO), Take 1 each by mouth daily, Disp: , Rfl:     nystatin (MYCOSTATIN) cream, Apply topically 2 (two) times a day, Disp: 30 g, Rfl: 0    tamsulosin (FLOMAX) 0 4 mg, Take 1 capsule (0 4 mg total) by mouth daily with dinner, Disp: 30 capsule, Rfl: 11    Current Allergies     Allergies as of 04/22/2022    (No Known Allergies)            The following portions of the patient's history were reviewed and updated as appropriate: allergies, current medications, past family history, past medical history, past social history, past surgical history and problem list      Past Medical History:   Diagnosis Date    Diabetes mellitus ([facility] [address] )        No past surgical history on file  No family history on file  Medications have been verified  Objective   Pulse 93   Temp (!) 96 4 °F (35 8 °C)   Resp 18   Wt 92 1 kg (203 lb)   SpO2 96%   BMI 34 84 kg/m²   No LMP for male patient  Physical Exam     Physical Exam  Vitals reviewed  Constitutional:       General: He is not in acute distress  Appearance: Normal appearance  HENT:      Head: Normocephalic and atraumatic  Right Ear: Tympanic membrane and ear canal normal       Left Ear: Tympanic membrane and ear canal normal       Mouth/Throat:      Mouth: Mucous membranes are moist       Dentition: Abnormal dentition  Dental tenderness, gingival swelling, dental abscesses and gum lesions present        Pharynx: No pharyngeal swelling, oropharyngeal exudate or posterior oropharyngeal erythema  Comments: Large abscess and bruising noted to upper left gum  Eyes:      Extraocular Movements: Extraocular movements intact  Conjunctiva/sclera: Conjunctivae normal       Pupils: Pupils are equal, round, and reactive to light  Cardiovascular:      Rate and Rhythm: Normal rate and regular rhythm  Pulses: Normal pulses  Heart sounds: Normal heart sounds  No murmur heard  Pulmonary:      Effort: Pulmonary effort is normal  No respiratory distress  Breath sounds: Normal breath sounds  Abdominal:      General: Abdomen is flat  Bowel sounds are normal  There is no distension  Palpations: Abdomen is soft  Tenderness: There is no abdominal tenderness  There is no guarding or rebound  Musculoskeletal:         General: No swelling or tenderness  Normal range of motion  Cervical back: Normal range of motion and neck supple  No tenderness  Skin:     General: Skin is warm  Neurological:      General: No focal deficit present  Mental Status: He is alert     Psychiatric:         Mood and Affect: Mood normal          Behavior: Behavior normal          Judgment: Judgment normal

## 2022-05-16 ENCOUNTER — ANESTHESIA (OUTPATIENT)
Dept: GASTROENTEROLOGY | Facility: HOSPITAL | Age: 62
End: 2022-05-16

## 2022-05-16 ENCOUNTER — HOSPITAL ENCOUNTER (OUTPATIENT)
Dept: GASTROENTEROLOGY | Facility: HOSPITAL | Age: 62
Setting detail: OUTPATIENT SURGERY
Discharge: HOME | End: 2022-05-16
Attending: INTERNAL MEDICINE | Admitting: INTERNAL MEDICINE

## 2022-05-16 ENCOUNTER — ANESTHESIA EVENT (OUTPATIENT)
Dept: GASTROENTEROLOGY | Facility: HOSPITAL | Age: 62
End: 2022-05-16

## 2022-05-16 DIAGNOSIS — Z12.11 SCREENING FOR COLON CANCER: ICD-10-CM

## 2022-05-16 RX ORDER — SODIUM CHLORIDE 9 MG/ML
100 INJECTION, SOLUTION INTRAVENOUS CONTINUOUS
OUTPATIENT
Start: 2022-05-16

## 2022-05-16 NOTE — H&P
History and Physical - [facility]  Philip Hernandez 64 y o  male MRN: [id number]                  HPI: Philip Hernandez is a 64y o  year old male who presents for colonoscopy for crc screening  But patient did not prep so colonoscopy will have to be rescheduled    REVIEW OF SYSTEMS: Per the HPI, and otherwise unremarkable  Historical Information   Past Medical History:   Diagnosis Date    Diabetes mellitus ([address] )      No past surgical history on file  Social History   Social History     Substance and Sexual Activity   Alcohol Use No     Social History     Substance and Sexual Activity   Drug Use No     Social History     Tobacco Use   Smoking Status Former Smoker   Smokeless Tobacco Never Used     No family history on file  Meds/Allergies       Current Outpatient Medications:     atorvastatin (LIPITOR) 40 mg tablet    Blood Glucose Monitoring Suppl (OneTouch Verio) w/Device KIT    Empagliflozin (Jardiance) 25 MG TABS    glipiZIDE (GLUCOTROL) 5 mg tablet    glucose blood (OneTouch Verio) test strip    Lancets (onetouch ultrasoft) lancets    loratadine (CLARITIN) 10 mg tablet    meclizine (ANTIVERT) 12 5 MG tablet    metFORMIN (GLUCOPHAGE) 1000 MG tablet    Multiple Vitamins-Minerals (CENTRUM SILVER PO)    Multiple Vitamins-Minerals (OCUVITE ADULT 50+ PO)    Multiple Vitamins-Minerals (OCUVITE-LUTEIN PO)    nystatin (MYCOSTATIN) cream    tamsulosin (FLOMAX) 0 4 mg    No Known Allergies    Objective     There were no vitals taken for this visit  PHYSICAL EXAM    Gen: NAD  Head: NCAT  CV: RRR  CHEST: Clear  ABD: soft, NT/ND  EXT: no edema      ASSESSMENT/PLAN:  This is a 64y o  year old male here for colonoscopy but due to lack of preparation will have to be rescheduled, and he is stable and optimized for his procedure

## 2022-05-16 NOTE — ANESTHESIA PREPROCEDURE EVALUATION
Procedure:  COLONOSCOPY    Relevant Problems   ANESTHESIA (within normal limits)      ENDO   (+) Type 2 diabetes mellitus without complication, without long-term current use of insulin [facility])        Physical Exam    Airway       Dental       Cardiovascular      Pulmonary      Other Findings        Anesthesia Plan  ASA Score- 2     Anesthesia Type- IV sedation with anesthesia with ASA Monitors           Additional Monitors:   Airway Plan:     Comment: Pt did not do his prep, case postponed per GI team        Plan Factors-    Induction-     Postoperative Plan-     Informed Consent-

## 2022-06-03 ENCOUNTER — OFFICE VISIT (OUTPATIENT)
Dept: FAMILY MEDICINE CLINIC | Facility: CLINIC | Age: 62
End: 2022-06-03

## 2022-06-03 VITALS
SYSTOLIC BLOOD PRESSURE: 136 MMHG | TEMPERATURE: 97.6 F | WEIGHT: 209 LBS | OXYGEN SATURATION: 98 % | HEART RATE: 86 BPM | RESPIRATION RATE: 18 BRPM | DIASTOLIC BLOOD PRESSURE: 74 MMHG | HEIGHT: 64 IN | BODY MASS INDEX: 35.68 KG/M2

## 2022-06-03 DIAGNOSIS — E11.9 TYPE 2 DIABETES MELLITUS WITHOUT COMPLICATION, WITHOUT LONG-TERM CURRENT USE OF INSULIN (HCC): Primary | ICD-10-CM

## 2022-06-03 DIAGNOSIS — E66.09 CLASS 1 OBESITY DUE TO EXCESS CALORIES WITH SERIOUS COMORBIDITY AND BODY MASS INDEX (BMI) OF 33.0 TO 33.9 IN ADULT: ICD-10-CM

## 2022-06-03 PROBLEM — K62.89 ANAL OR RECTAL PAIN: Status: RESOLVED | Noted: 2021-10-04 | Resolved: 2022-06-03

## 2022-06-03 PROBLEM — R42 DIZZINESS: Status: RESOLVED | Noted: 2021-06-25 | Resolved: 2022-06-03

## 2022-06-03 LAB — SL AMB POCT HEMOGLOBIN AIC: 6 (ref ?–6.5)

## 2022-06-03 PROCEDURE — 83036 HEMOGLOBIN GLYCOSYLATED A1C: CPT | Performed by: FAMILY MEDICINE

## 2022-06-03 PROCEDURE — 99213 OFFICE O/P EST LOW 20 MIN: CPT | Performed by: FAMILY MEDICINE

## 2022-06-03 NOTE — ASSESSMENT & PLAN NOTE
Assessment:   BMI 35 87, morbid obese  Increased BMI since last visit by less than 1  Goal: BMI between 18 5 to 25  Weight increase since last visit 3 pounds    Plan  Encourage the patient to loose at least 5 pounds before next visit  Counseled patient on the importance of working to achieve weight reduction goal  Discussed benefits of weight loss including prevention or control of comorbidities  Discussed role that balanced diet and daily activity play in weight reduction  Set up small attainable weight loss goals  Involve family, friends, and co-workers for support  Exercise should be 30 minutes 5 times weekly of moderate intensity activity, brisk walking acceptable  Recommended diet include mediterranean and DASH  Primary focus should be unprocessed foods, fresh fruits &  vegetables, plant based fats and protein, legumes, whole grain and nuts  Vegetables and fruit should make up 1/2 each meal  Limit red meats, fast food and eating out at restaurants  Reassess in 3 months

## 2022-06-03 NOTE — ASSESSMENT & PLAN NOTE
Lab Results   Component Value Date    HGBA1C 8 1 (A) 02/11/2022   Assessment:   · Today's A1c is 6%, control  · Goal less than 7%, with FBG between 7-130 mg/d and 2 hr postprandial glucose of less than 180 mg/dl, poorly controlled  · Currently on Metformin 1000 mg b i d  Glipizide 5 mg and Jardiance 25 mg daily, compliant  · Creatinine albumin radio 16 5, within normal limits  No Ace or arbs required this point  · Diabetic logs ranges from 60 a to 140 mg/dL    Plan:   · Will continue current treatment except for glipizide the will be discontinue since patient has very tight glycemic control  · A1C goals reviewed with patient  · The patient was educated on the importance of medication compliance and to monitor blood glucose at home on a daily basis  · Encourage life style changes including diabetes diet and exercise  · Will repeat a1c every  3 months until goal is reached, then q6 months  · Repeat foot exam in  At next visit  · Monitor log blood glucose levels at home on daily basis for further review at next office visit with labs  · Will f/u in 2 wks with blood sugars logs

## 2022-06-03 NOTE — PROGRESS NOTES
Assessment/Plan:    Type 2 diabetes mellitus without complication, without long-term current use of insulin ([facility])    Lab Results   Component Value Date    HGBA1C 8 1 (A) 02/11/2022   Assessment:   · Today's A1c is 6%, control  · Goal less than 7%, with FBG between 7-130 mg/d and 2 hr postprandial glucose of less than 180 mg/dl, poorly controlled  · Currently on Metformin 1000 mg b i d  Glipizide 5 mg and Jardiance 25 mg daily, compliant  · Creatinine albumin radio 16 5, within normal limits  No Ace or arbs required this point  · Diabetic logs ranges from 60 a to 140 mg/dL    Plan:   · Will continue current treatment except for glipizide the will be discontinue since patient has very tight glycemic control  · A1C goals reviewed with patient  · The patient was educated on the importance of medication compliance and to monitor blood glucose at home on a daily basis  · Encourage life style changes including diabetes diet and exercise  · Will repeat a1c every  3 months until goal is reached, then q6 months  · Repeat foot exam in  At next visit  · Monitor log blood glucose levels at home on daily basis for further review at next office visit with labs  · Will f/u in 2 wks with blood sugars logs  Class 1 obesity due to excess calories with serious comorbidity and body mass index (BMI) of 33 0 to 33 9 in adult  Assessment:   BMI 35 87, morbid obese  Increased BMI since last visit by less than 1  Goal: BMI between 18 5 to 25  Weight increase since last visit 3 pounds    Plan  Encourage the patient to loose at least 5 pounds before next visit  Counseled patient on the importance of working to achieve weight reduction goal  Discussed benefits of weight loss including prevention or control of comorbidities  Discussed role that balanced diet and daily activity play in weight reduction  Set up small attainable weight loss goals  Involve family, friends, and co-workers for support   Exercise should be 30 minutes 5 times weekly of moderate intensity activity, brisk walking acceptable  Recommended diet include mediterranean and DASH  Primary focus should be unprocessed foods, fresh fruits &  vegetables, plant based fats and protein, legumes, whole grain and nuts  Vegetables and fruit should make up 1/2 each meal  Limit red meats, fast food and eating out at restaurants  Reassess in 3 months  Diagnoses and all orders for this visit:    Type 2 diabetes mellitus without complication, without long-term current use of insulin (HCC)  -     POCT hemoglobin A1c    Class 1 obesity due to excess calories with serious comorbidity and body mass index (BMI) of 33 0 to 33 9 in adult          -Dizziness resolved  -Rectal pain resolved  Subjective:      Patient ID: Amanda Washburn is a 64 y o  male  Amanda Washburn is a 64 y o   male with PMH significant for DM type 2, cholinergic urticaria, obesity class 1 , who presents for a diabetic follow-up  DM type 2:  Patient reports that has been diabetic for more than 20 years his most recent A1c was 8 1 back in February of this year  , currently on Jardiance 25 mg daily, metformin 1000 mg bid  Denies side effects, compliant, follows with endocrinology  On lisinopril 2 5 mg with last UA from 9/2021 showed microalbuminuria  Patient's medical conditions are stable unless noted otherwise above   Patient has not had any recent hospitalizations, or medical emergencies since last visit  Patient reports compliance with his meds; patient has no further complaints other than what is mentioned in the ROS  The following portions of the patient's history were reviewed and updated as appropriate: allergies, current medications, past family history, past medical history, past social history, past surgical history and problem list     Review of Systems   Constitutional: Negative for chills and fever  HENT: Negative for ear pain and sore throat      Eyes: Negative for pain and visual disturbance  Respiratory: Negative for cough and shortness of breath  Cardiovascular: Negative for chest pain and palpitations  Gastrointestinal: Negative for abdominal pain and vomiting  Genitourinary: Negative for dysuria and hematuria  Musculoskeletal: Negative for arthralgias and back pain  Skin: Negative for color change and rash  Neurological: Negative for seizures and syncope  All other systems reviewed and are negative  Objective:      /74 (BP Location: Left arm, Patient Position: Sitting, Cuff Size: Large)   Pulse 86   Temp 97 6 °F (36 4 °C) (Temporal)   Resp 18   Ht 5' 4" (1 626 m)   Wt 94 8 kg (209 lb)   SpO2 98%   BMI 35 87 kg/m²          Physical Exam  Vitals and nursing note reviewed  Constitutional:       General: He is not in acute distress  Appearance: He is well-developed  He is obese  He is not ill-appearing, toxic-appearing or diaphoretic  HENT:      Head: Normocephalic and atraumatic  Eyes:      General: No scleral icterus  Extraocular Movements: Extraocular movements intact  Cardiovascular:      Rate and Rhythm: Normal rate and regular rhythm  No extrasystoles are present  Pulses:           Radial pulses are 2+ on the right side and 2+ on the left side  Heart sounds: Normal heart sounds, S1 normal and S2 normal  No murmur heard  No friction rub  No gallop  Pulmonary:      Effort: Pulmonary effort is normal  No respiratory distress  Breath sounds: Normal breath sounds and air entry  Abdominal:      General: Bowel sounds are normal       Palpations: Abdomen is soft  There is no mass  Tenderness: There is no abdominal tenderness  There is no right CVA tenderness, left CVA tenderness, guarding or rebound  Musculoskeletal:         General: No swelling, tenderness, deformity or signs of injury  Normal range of motion  Cervical back: Normal range of motion  Right lower leg: No edema        Left lower leg: No edema  Feet:      Right foot:      Skin integrity: No ulcer, skin breakdown, erythema, warmth, callus or dry skin  Left foot:      Skin integrity: No ulcer, skin breakdown, erythema, warmth, callus or dry skin  Skin:     General: Skin is warm  Findings: No rash  Neurological:      General: No focal deficit present  Mental Status: He is alert

## 2022-06-14 PROBLEM — N40.1 BENIGN PROSTATIC HYPERPLASIA WITH LOWER URINARY TRACT SYMPTOMS: Status: ACTIVE | Noted: 2022-06-14

## 2022-06-14 PROBLEM — R31.29 MICROSCOPIC HEMATURIA: Status: ACTIVE | Noted: 2022-06-14

## 2022-07-20 ENCOUNTER — APPOINTMENT (OUTPATIENT)
Dept: LAB | Age: 62
End: 2022-07-20
Payer: MEDICARE

## 2022-07-20 DIAGNOSIS — E11.9 TYPE 2 DIABETES MELLITUS WITHOUT COMPLICATION, WITHOUT LONG-TERM CURRENT USE OF INSULIN (HCC): ICD-10-CM

## 2022-07-20 LAB
ANION GAP SERPL CALCULATED.3IONS-SCNC: 8 MMOL/L (ref 4–13)
BUN SERPL-MCNC: 17 MG/DL (ref 5–25)
CALCIUM SERPL-MCNC: 8.8 MG/DL (ref 8.3–10.1)
CHLORIDE SERPL-SCNC: 111 MMOL/L (ref 96–108)
CHOLEST SERPL-MCNC: 115 MG/DL
CO2 SERPL-SCNC: 24 MMOL/L (ref 21–32)
CREAT SERPL-MCNC: 1.03 MG/DL (ref 0.6–1.3)
GFR SERPL CREATININE-BSD FRML MDRD: 78 ML/MIN/1.73SQ M
GLUCOSE P FAST SERPL-MCNC: 99 MG/DL (ref 65–99)
HDLC SERPL-MCNC: 41 MG/DL
LDLC SERPL CALC-MCNC: 57 MG/DL (ref 0–100)
NONHDLC SERPL-MCNC: 74 MG/DL
POTASSIUM SERPL-SCNC: 4 MMOL/L (ref 3.5–5.3)
SODIUM SERPL-SCNC: 143 MMOL/L (ref 135–147)
TRIGL SERPL-MCNC: 83 MG/DL

## 2022-07-20 PROCEDURE — 80048 BASIC METABOLIC PNL TOTAL CA: CPT

## 2022-07-20 PROCEDURE — 80061 LIPID PANEL: CPT

## 2022-07-20 PROCEDURE — 36415 COLL VENOUS BLD VENIPUNCTURE: CPT

## 2022-08-08 ENCOUNTER — OFFICE VISIT (OUTPATIENT)
Dept: UROLOGY | Facility: MEDICAL CENTER | Age: 62
End: 2022-08-08
Payer: MEDICARE

## 2022-08-08 VITALS
DIASTOLIC BLOOD PRESSURE: 76 MMHG | HEART RATE: 74 BPM | OXYGEN SATURATION: 98 % | HEIGHT: 64 IN | WEIGHT: 192 LBS | SYSTOLIC BLOOD PRESSURE: 120 MMHG | BODY MASS INDEX: 32.78 KG/M2

## 2022-08-08 DIAGNOSIS — R31.29 MICROHEMATURIA: ICD-10-CM

## 2022-08-08 DIAGNOSIS — N40.1 BENIGN PROSTATIC HYPERPLASIA WITH NOCTURIA: Primary | ICD-10-CM

## 2022-08-08 DIAGNOSIS — R35.1 BENIGN PROSTATIC HYPERPLASIA WITH NOCTURIA: Primary | ICD-10-CM

## 2022-08-08 DIAGNOSIS — Z12.5 PROSTATE CANCER SCREENING: ICD-10-CM

## 2022-08-08 LAB
SL AMB  POCT GLUCOSE, UA: ABNORMAL
SL AMB LEUKOCYTE ESTERASE,UA: ABNORMAL
SL AMB POCT BILIRUBIN,UA: ABNORMAL
SL AMB POCT BLOOD,UA: ABNORMAL
SL AMB POCT CLARITY,UA: CLEAR
SL AMB POCT COLOR,UA: YELLOW
SL AMB POCT KETONES,UA: ABNORMAL
SL AMB POCT NITRITE,UA: ABNORMAL
SL AMB POCT PH,UA: 5.5
SL AMB POCT SPECIFIC GRAVITY,UA: 1.01
SL AMB POCT URINE PROTEIN: ABNORMAL
SL AMB POCT UROBILINOGEN: 0.2

## 2022-08-08 PROCEDURE — 99213 OFFICE O/P EST LOW 20 MIN: CPT | Performed by: UROLOGY

## 2022-08-08 PROCEDURE — 81003 URINALYSIS AUTO W/O SCOPE: CPT | Performed by: UROLOGY

## 2022-08-08 NOTE — PROGRESS NOTES
HISTORY:    Doing well, on tamsulosin for BPH with improved urination  Rare nocturia, good stream during the day    Micro hematuria evaluation last year, cysto showed moderate prostate enlargement, renal ultrasound negative  PSA 0 9         ASSESSMENT / PLAN:    Doing well    No further evaluation needed    Continue meds    Follow-up as needed, will ask PCP to refill tamsulosin yearly    The following portions of the patient's history were reviewed and updated as appropriate: allergies, current medications, past family history, past medical history, past social history, past surgical history and problem list     Review of Systems   All other systems reviewed and are negative  Objective:     Physical Exam  Constitutional:       General: He is not in acute distress  Appearance: He is well-developed  He is not diaphoretic  HENT:      Head: Normocephalic and atraumatic  Eyes:      General: No scleral icterus  Pulmonary:      Effort: Pulmonary effort is normal    Skin:     Coloration: Skin is not pale  Neurological:      Mental Status: He is alert and oriented to person, place, and time  Psychiatric:         Behavior: Behavior normal          Thought Content:  Thought content normal          Judgment: Judgment normal            0   Lab Value Date/Time    PSA 0 9 11/23/2021 1000   ]  BUN   Date Value Ref Range Status   07/20/2022 17 5 - 25 mg/dL Final   01/14/2014 13 5 - 27 mg/dL Final     Creatinine   Date Value Ref Range Status   07/20/2022 1 03 0 60 - 1 30 mg/dL Final     Comment:     Standardized to IDMS reference method   01/14/2014 0 93 0 60 - 1 30 mg/dL Final     Comment:     Standardized to IDMS reference method     No components found for: CBC      Patient Active Problem List   Diagnosis    Mass of skin    Skin tags, multiple acquired    Type 2 diabetes mellitus without complication, without long-term current use of insulin (HCC)    Cholinergic urticaria    Subclinical thyrotoxicosis    Class 1 obesity due to excess calories with serious comorbidity and body mass index (BMI) of 33 0 to 33 9 in adult    Rash of genital area    Benign prostatic hyperplasia with lower urinary tract symptoms    Microscopic hematuria        Diagnoses and all orders for this visit:    Benign prostatic hyperplasia with nocturia  -     POCT urine dip auto non-scope    Microhematuria  -     POCT urine dip auto non-scope    Prostate cancer screening  -     POCT urine dip auto non-scope    Other orders  -     Multiple Vitamins-Minerals (CENTRUM ADULTS PO); Take by mouth in the morning SUPPLEMENT           Patient ID: Kanchan Ernst is a 64 y o  male  Current Outpatient Medications:     atorvastatin (LIPITOR) 40 mg tablet, Take 1 tablet (40 mg total) by mouth daily, Disp: 90 tablet, Rfl: 1    Blood Glucose Monitoring Suppl (OneTouch Verio) w/Device KIT, Use 2 (two) times a day, Disp: 1 kit, Rfl: 0    Empagliflozin (Jardiance) 25 MG TABS, Take 1 tablet (25 mg total) by mouth daily, Disp: 90 tablet, Rfl: 1    glucose blood (OneTouch Verio) test strip, Use as instructed, Disp: 100 strip, Rfl: 2    Lancets (onetouch ultrasoft) lancets, Use as instructed, Disp: 100 each, Rfl: 2    metFORMIN (GLUCOPHAGE) 1000 MG tablet, Take 1 tablet (1,000 mg total) by mouth 2 (two) times a day, Disp: 180 tablet, Rfl: 1    Multiple Vitamins-Minerals (CENTRUM ADULTS PO), Take by mouth in the morning SUPPLEMENT, Disp: , Rfl:     Multiple Vitamins-Minerals (OCUVITE-LUTEIN PO), Take 1 each by mouth daily, Disp: , Rfl:     tamsulosin (FLOMAX) 0 4 mg, Take 1 capsule (0 4 mg total) by mouth daily with dinner, Disp: 30 capsule, Rfl: 11    loratadine (CLARITIN) 10 mg tablet, TAKE ONE TABLET BY MOUTH EVERY DAY (Patient not taking: Reported on 8/8/2022), Disp: 90 tablet, Rfl: 0    Past Medical History:   Diagnosis Date    Diabetes mellitus ([facility] [address] )        No past surgical history on file      Social History

## 2022-09-08 DIAGNOSIS — E11.9 TYPE 2 DIABETES MELLITUS WITHOUT COMPLICATION, WITHOUT LONG-TERM CURRENT USE OF INSULIN (HCC): ICD-10-CM

## 2022-10-24 ENCOUNTER — OFFICE VISIT (OUTPATIENT)
Dept: URGENT CARE | Age: 62
End: 2022-10-24
Payer: MEDICARE

## 2022-10-24 VITALS
DIASTOLIC BLOOD PRESSURE: 77 MMHG | BODY MASS INDEX: 32.27 KG/M2 | WEIGHT: 189 LBS | TEMPERATURE: 97.7 F | RESPIRATION RATE: 20 BRPM | HEIGHT: 64 IN | OXYGEN SATURATION: 97 % | HEART RATE: 86 BPM | SYSTOLIC BLOOD PRESSURE: 128 MMHG

## 2022-10-24 DIAGNOSIS — R21 RASH: Primary | ICD-10-CM

## 2022-10-24 PROCEDURE — 99213 OFFICE O/P EST LOW 20 MIN: CPT

## 2022-10-24 RX ORDER — TRIAMCINOLONE ACETONIDE 1 MG/G
CREAM TOPICAL 2 TIMES DAILY
Qty: 30 G | Refills: 0 | Status: SHIPPED | OUTPATIENT
Start: 2022-10-24

## 2022-10-24 NOTE — PROGRESS NOTES
[address] [email or web address]        NAME: Clint Rodriguez is a 58 y o  male  : 1960    MRN: [id number]  DATE: 2022  TIME: 7:54 PM    Assessment and Plan   Rash [R21]  1  Rash  triamcinolone (KENALOG) 0 1 % cream         Patient Instructions     Kenalog as directed  Follow up with PCP in 2-3 days  Proceed to ER if symptoms worsen  Chief Complaint     Chief Complaint   Patient presents with   • Rash     Pt reports rash for 5-6 weeks  C/o itching, attempted RX cream,requesting refill  History of Present Illness     58 y o  M presents with complaint of bilateral UE rash x 6 weeks  States rash is itchy, denies pain  Using Clobetasone & Betamethasone cream from  prescription, requesting refill  Denies URI symptoms  Denies recent travel  Denies trauma or injury  Review of Systems   Review of Systems   Constitutional: Negative for chills, fatigue and fever  HENT: Negative for congestion, ear discharge, ear pain, facial swelling, rhinorrhea, sinus pressure, sinus pain, sore throat and trouble swallowing  Eyes: Negative for photophobia, pain and visual disturbance  Respiratory: Negative for cough, chest tightness, shortness of breath and wheezing  Cardiovascular: Negative for chest pain, palpitations and leg swelling  Gastrointestinal: Negative for abdominal pain, diarrhea, nausea and vomiting  Genitourinary: Negative for dysuria, flank pain and hematuria  Musculoskeletal: Negative for arthralgias, back pain, myalgias and neck pain  Skin: Positive for rash  Negative for pallor and wound  Neurological: Negative for dizziness, syncope, weakness, numbness and headaches  Psychiatric/Behavioral: Negative for confusion and sleep disturbance  All other systems reviewed and are negative          Current Medications       Current Outpatient Medications:   •  atorvastatin (LIPITOR) 40 mg tablet, Take 1 tablet (40 mg total) by mouth daily, Disp: 90 tablet, Rfl: 1  •  Blood Glucose Monitoring Suppl (OneTouch Verio) w/Device KIT, Use 2 (two) times a day, Disp: 1 kit, Rfl: 0  •  Empagliflozin (Jardiance) 25 MG TABS, Take 1 tablet (25 mg total) by mouth daily, Disp: 90 tablet, Rfl: 1  •  glucose blood (OneTouch Verio) test strip, Use as instructed, Disp: 100 strip, Rfl: 2  •  Lancets (onetouch ultrasoft) lancets, Use as instructed, Disp: 100 each, Rfl: 2  •  loratadine (CLARITIN) 10 mg tablet, TAKE ONE TABLET BY MOUTH EVERY DAY, Disp: 90 tablet, Rfl: 0  •  metFORMIN (GLUCOPHAGE) 1000 MG tablet, TAKE ONE TABLET BY MOUTH TWICE A DAY, Disp: 180 tablet, Rfl: 1  •  Multiple Vitamins-Minerals (CENTRUM ADULTS PO), Take by mouth in the morning SUPPLEMENT, Disp: , Rfl:   •  Multiple Vitamins-Minerals (OCUVITE-LUTEIN PO), Take 1 each by mouth daily, Disp: , Rfl:   •  tamsulosin (FLOMAX) 0 4 mg, Take 1 capsule (0 4 mg total) by mouth daily with dinner, Disp: 30 capsule, Rfl: 11  •  triamcinolone (KENALOG) 0 1 % cream, Apply topically 2 (two) times a day, Disp: 30 g, Rfl: 0    Current Allergies     Allergies as of 10/24/2022   • (No Known Allergies)            The following portions of the patient's history were reviewed and updated as appropriate: allergies, current medications, past family history, past medical history, past social history, past surgical history and problem list      Past Medical History:   Diagnosis Date   • Diabetes mellitus ([facility] [address] )        History reviewed  No pertinent surgical history  History reviewed  No pertinent family history  Medications have been verified  Objective   /77   Pulse 86   Temp 97 7 °F (36 5 °C)   Resp 20   Ht 5' 4" (1 626 m)   Wt 85 7 kg (189 lb)   SpO2 97%   BMI 32 44 kg/m²   No LMP for male patient  Physical Exam     Physical Exam  Vitals reviewed  Constitutional:       General: He is not in acute distress  Appearance: He is normal weight  He is not ill-appearing or toxic-appearing  HENT:      Head: Normocephalic        Right Ear: Tympanic membrane normal  No middle ear effusion  Tympanic membrane is not erythematous or bulging  Left Ear: Tympanic membrane normal   No middle ear effusion  Tympanic membrane is not erythematous or bulging  Nose: Nose normal       Right Sinus: No maxillary sinus tenderness or frontal sinus tenderness  Left Sinus: No maxillary sinus tenderness or frontal sinus tenderness  Mouth/Throat:      Mouth: Mucous membranes are moist       Pharynx: Uvula midline  No oropharyngeal exudate, posterior oropharyngeal erythema or uvula swelling  Tonsils: No tonsillar exudate or tonsillar abscesses  Eyes:      Extraocular Movements: Extraocular movements intact  Conjunctiva/sclera: Conjunctivae normal       Pupils: Pupils are equal, round, and reactive to light  Cardiovascular:      Rate and Rhythm: Normal rate and regular rhythm  Pulses: Normal pulses  Heart sounds: Normal heart sounds  Pulmonary:      Effort: Pulmonary effort is normal  No tachypnea or respiratory distress  Breath sounds: Normal breath sounds and air entry  No decreased breath sounds, wheezing, rhonchi or rales  Abdominal:      General: Bowel sounds are normal       Palpations: Abdomen is soft  Tenderness: There is no abdominal tenderness  Musculoskeletal:         General: Normal range of motion  Cervical back: Normal range of motion and neck supple  Lymphadenopathy:      Cervical: No cervical adenopathy  Skin:     General: Skin is warm and dry  Capillary Refill: Capillary refill takes less than 2 seconds  Findings: Rash present  Rash is papular  Comments:   No plaques no scaling  No vesicles or pustules  Appears allergic in nature  No signs of bacterial or fungal source   Neurological:      General: No focal deficit present  Mental Status: He is alert  Cranial Nerves: Cranial nerves are intact  No cranial nerve deficit  Sensory: Sensation is intact  Motor: Motor function is intact  Coordination: Coordination is intact  Deep Tendon Reflexes: Reflexes are normal and symmetric

## 2022-11-02 DIAGNOSIS — R21 RASH: ICD-10-CM

## 2022-11-02 RX ORDER — TRIAMCINOLONE ACETONIDE 1 MG/G
CREAM TOPICAL
Qty: 30 G | Refills: 0 | OUTPATIENT
Start: 2022-11-02

## 2022-11-08 ENCOUNTER — OFFICE VISIT (OUTPATIENT)
Dept: FAMILY MEDICINE CLINIC | Facility: CLINIC | Age: 62
End: 2022-11-08

## 2022-11-08 ENCOUNTER — OFFICE VISIT (OUTPATIENT)
Dept: DENTISTRY | Facility: CLINIC | Age: 62
End: 2022-11-08

## 2022-11-08 VITALS
RESPIRATION RATE: 16 BRPM | HEIGHT: 64 IN | WEIGHT: 190 LBS | TEMPERATURE: 98.7 F | HEART RATE: 90 BPM | OXYGEN SATURATION: 98 % | BODY MASS INDEX: 32.44 KG/M2 | SYSTOLIC BLOOD PRESSURE: 120 MMHG | DIASTOLIC BLOOD PRESSURE: 80 MMHG

## 2022-11-08 VITALS — HEART RATE: 85 BPM | SYSTOLIC BLOOD PRESSURE: 103 MMHG | TEMPERATURE: 98.6 F | DIASTOLIC BLOOD PRESSURE: 71 MMHG

## 2022-11-08 DIAGNOSIS — G89.29 CHRONIC LEFT SHOULDER PAIN: Primary | ICD-10-CM

## 2022-11-08 DIAGNOSIS — K05.6 PERIODONTAL DISEASE: Primary | ICD-10-CM

## 2022-11-08 DIAGNOSIS — R21 RASH: ICD-10-CM

## 2022-11-08 DIAGNOSIS — J30.2 SEASONAL ALLERGIES: ICD-10-CM

## 2022-11-08 DIAGNOSIS — Z74.8 ASSISTANCE NEEDED WITH TRANSPORTATION: ICD-10-CM

## 2022-11-08 DIAGNOSIS — M25.512 CHRONIC LEFT SHOULDER PAIN: Primary | ICD-10-CM

## 2022-11-08 RX ORDER — TRIAMCINOLONE ACETONIDE 1 MG/G
CREAM TOPICAL 2 TIMES DAILY
Qty: 30 G | Refills: 0 | Status: SHIPPED | OUTPATIENT
Start: 2022-11-08

## 2022-11-08 RX ORDER — LORATADINE 10 MG/1
10 TABLET ORAL DAILY
Qty: 90 TABLET | Refills: 0 | Status: SHIPPED | OUTPATIENT
Start: 2022-11-08

## 2022-11-08 RX ORDER — CYCLOBENZAPRINE HCL 10 MG
10 TABLET ORAL 3 TIMES DAILY PRN
Qty: 20 TABLET | Refills: 0 | Status: SHIPPED | OUTPATIENT
Start: 2022-11-08

## 2022-11-08 RX ORDER — NAPROXEN 500 MG/1
500 TABLET ORAL 2 TIMES DAILY WITH MEALS
Qty: 30 TABLET | Refills: 0 | Status: SHIPPED | OUTPATIENT
Start: 2022-11-08

## 2022-11-08 NOTE — PROGRESS NOTES
Oral Surgery    Kanchan Ernst presents for Ext #3, #4, #5     Pt wishes to have dentures made  Explained differences between conventional and immediate dentures including timeline for each, the need for a reline or remake with an immediate denture and explained that a remake or a reline may be an out of pocket fee  Pt wishes to have conventional denture made, explained that the healing process witll take several months and may take longer because he is diabetic  Explained that pt will be without teeth for the duration of the healing period  Pt understands and wishes to proceed with tx of conventional denture  [address], patient denies any changes  Obtained a direct and personal consent  Risks and complications were explained  Pt agreed and consented  Consent scanned in doc center  Pre-Op BP WNL  Administered cc of 2 % Lidocaine w/ 1:100,000 epi via infiltration  ? Adequate anesthesia obtained, reflected gingiva, elevated, and extracted #3,4,5    Socket irrigated    Upon dismissal, patient received POI, ice, gauze, and RX    NV: Continue ext

## 2022-11-08 NOTE — PROGRESS NOTES
Name: Tamika Nash      : 1960      MRN: [id number]  Encounter Provider: Dante Kwong MD  Encounter Date: 2022   Encounter department: [facility]     1  Chronic left shoulder pain  -     XR shoulder 2+ vw left; Future; Expected date: 2022  -     naproxen (Naprosyn) 500 mg tablet; Take 1 tablet (500 mg total) by mouth 2 (two) times a day with meals  -     cyclobenzaprine (FLEXERIL) 10 mg tablet; Take 1 tablet (10 mg total) by mouth 3 (three) times a day as needed for muscle spasms  -     Ambulatory Referral to Physical Therapy; Future    2  Seasonal allergies  -     loratadine (CLARITIN) 10 mg tablet; Take 1 tablet (10 mg total) by mouth daily    3  Rash  -     triamcinolone (KENALOG) 0 1 % cream; Apply topically 2 (two) times a day    4  Assistance needed with transportation  -     Ambulatory Referral to Social Work Care Management Program; Future           Subjective      Tamika Nash is a 58 y o  male has a past medical history of Diabetes mellitus ([address] )  who presented to the office today with h/o of left shoulder pain for the past 2 months  Right handed, and used to work as a [employer]  until about 5 years ago  Did have mild pain in the shoulder previously, but has increased over the past 2 months  He has not tried any medication, but he does exercise daily and that has helped  H/o of itchy rash o the left side of the neck and upper back area    Shoulder Pain   The pain is present in the left shoulder  This is a recurrent problem  The current episode started more than 1 month ago  There has been no history of extremity trauma  The problem occurs constantly  The problem has been gradually worsening  The quality of the pain is described as aching  The pain is at a severity of 5/10  Associated symptoms include a limited range of motion  Pertinent negatives include no fever  He has tried nothing for the symptoms   The treatment provided mild relief  His past medical history is significant for diabetes  Review of Systems   Constitutional: Negative for chills and fever  HENT: Negative for congestion, rhinorrhea and sore throat  Respiratory: Negative for cough and shortness of breath  Cardiovascular: Negative for chest pain  Gastrointestinal: Negative for diarrhea, nausea and vomiting  Skin: Negative for rash  Allergic/Immunologic: Positive for environmental allergies  Neurological: Negative for dizziness and headaches  Current Outpatient Medications on File Prior to Visit   Medication Sig   • atorvastatin (LIPITOR) 40 mg tablet Take 1 tablet (40 mg total) by mouth daily   • Blood Glucose Monitoring Suppl (OneTouch Verio) w/Device KIT Use 2 (two) times a day   • Empagliflozin (Jardiance) 25 MG TABS Take 1 tablet (25 mg total) by mouth daily   • glucose blood (OneTouch Verio) test strip Use as instructed   • Lancets (onetouch ultrasoft) lancets Use as instructed   • metFORMIN (GLUCOPHAGE) 1000 MG tablet TAKE ONE TABLET BY MOUTH TWICE A DAY   • Multiple Vitamins-Minerals (CENTRUM ADULTS PO) Take by mouth in the morning SUPPLEMENT   • Multiple Vitamins-Minerals (OCUVITE-LUTEIN PO) Take 1 each by mouth daily   • tamsulosin (FLOMAX) 0 4 mg Take 1 capsule (0 4 mg total) by mouth daily with dinner   • [DISCONTINUED] nystatin (MYCOSTATIN) cream Apply topically 2 (two) times a day       Objective     /80 (BP Location: Right arm, Patient Position: Sitting, Cuff Size: Standard)   Pulse 90   Temp 98 7 °F (37 1 °C) (Temporal)   Resp 16   Ht 5' 4" (1 626 m)   Wt 86 2 kg (190 lb)   SpO2 98%   BMI 32 61 kg/m²     Physical Exam  Vitals and nursing note reviewed  Constitutional:       Appearance: He is well-developed  HENT:      Head: Normocephalic and atraumatic  Cardiovascular:      Rate and Rhythm: Normal rate and regular rhythm  Heart sounds: Normal heart sounds  No murmur heard    Pulmonary:      Effort: Pulmonary effort is normal  No respiratory distress  Breath sounds: Normal breath sounds  Abdominal:      General: There is no distension  Palpations: Abdomen is soft  Musculoskeletal:      Right shoulder: No tenderness  Decreased range of motion  Normal strength  Left shoulder: Tenderness present  Decreased range of motion  Normal strength  Skin:     Capillary Refill: Capillary refill takes less than 2 seconds  Neurological:      General: No focal deficit present  Mental Status: He is alert and oriented to person, place, and time     Psychiatric:         Mood and Affect: Mood normal        Elizabeth Villatoro MD

## 2022-11-15 ENCOUNTER — APPOINTMENT (OUTPATIENT)
Dept: RADIOLOGY | Age: 62
End: 2022-11-15

## 2022-11-15 DIAGNOSIS — M25.512 CHRONIC LEFT SHOULDER PAIN: ICD-10-CM

## 2022-11-15 DIAGNOSIS — G89.29 CHRONIC LEFT SHOULDER PAIN: ICD-10-CM

## 2022-11-25 ENCOUNTER — APPOINTMENT (OUTPATIENT)
Dept: LAB | Age: 62
End: 2022-11-25

## 2022-11-25 DIAGNOSIS — Z12.5 PROSTATE CANCER SCREENING: ICD-10-CM

## 2022-11-25 LAB — PSA SERPL-MCNC: 2.1 NG/ML (ref 0–4)

## 2022-11-29 ENCOUNTER — OFFICE VISIT (OUTPATIENT)
Dept: DENTISTRY | Facility: CLINIC | Age: 62
End: 2022-11-29

## 2022-11-29 ENCOUNTER — OFFICE VISIT (OUTPATIENT)
Dept: FAMILY MEDICINE CLINIC | Facility: CLINIC | Age: 62
End: 2022-11-29

## 2022-11-29 VITALS — DIASTOLIC BLOOD PRESSURE: 77 MMHG | HEART RATE: 96 BPM | SYSTOLIC BLOOD PRESSURE: 121 MMHG | TEMPERATURE: 97.8 F

## 2022-11-29 VITALS
SYSTOLIC BLOOD PRESSURE: 100 MMHG | HEART RATE: 81 BPM | DIASTOLIC BLOOD PRESSURE: 60 MMHG | OXYGEN SATURATION: 98 % | RESPIRATION RATE: 18 BRPM | WEIGHT: 189 LBS | BODY MASS INDEX: 32.44 KG/M2 | TEMPERATURE: 97 F

## 2022-11-29 DIAGNOSIS — Z23 ENCOUNTER FOR IMMUNIZATION: ICD-10-CM

## 2022-11-29 DIAGNOSIS — M25.512 CHRONIC LEFT SHOULDER PAIN: Primary | ICD-10-CM

## 2022-11-29 DIAGNOSIS — M19.012 ARTHRITIS OF LEFT ACROMIOCLAVICULAR JOINT: ICD-10-CM

## 2022-11-29 DIAGNOSIS — K04.5: Primary | ICD-10-CM

## 2022-11-29 DIAGNOSIS — G89.29 CHRONIC LEFT SHOULDER PAIN: Primary | ICD-10-CM

## 2022-11-29 RX ORDER — NAPROXEN 500 MG/1
500 TABLET ORAL 2 TIMES DAILY WITH MEALS
Qty: 60 TABLET | Refills: 1 | Status: SHIPPED | OUTPATIENT
Start: 2022-11-29

## 2022-11-29 RX ORDER — CYCLOBENZAPRINE HCL 10 MG
10 TABLET ORAL 3 TIMES DAILY PRN
Qty: 30 TABLET | Refills: 1 | Status: SHIPPED | OUTPATIENT
Start: 2022-11-29

## 2022-11-29 NOTE — PROGRESS NOTES
Name: Kanchan Ernst      : 1960      MRN: [id number]  Encounter Provider: Koby Morales MD  Encounter Date: 2022   Encounter department: [address] [place] [address]  Chronic left shoulder pain  Assessment & Plan:  Refilled the Naproxen and Flexeril  Referred again to Physical Therapy  May also use Voltaren gel  Ice, Heat, Massage, Stretches    Orders:  -     cyclobenzaprine (FLEXERIL) 10 mg tablet; Take 1 tablet (10 mg total) by mouth 3 (three) times a day as needed for muscle spasms  -     naproxen (Naprosyn) 500 mg tablet; Take 1 tablet (500 mg total) by mouth 2 (two) times a day with meals  -     Diclofenac Sodium (VOLTAREN) 1 %; Apply 2 g topically 4 (four) times a day    2  Arthritis of left acromioclavicular joint  -     Diclofenac Sodium (VOLTAREN) 1 %; Apply 2 g topically 4 (four) times a day    3  Encounter for immunization  -     influenza vaccine, quadrivalent, recombinant, PF, 0 5 mL, for patients 18 yr+ (FLUBLOK)         Subjective      Kanchan Ernst is a 58 y o  male has a past medical history of Diabetes mellitus ([address] ) who presented to the office today for follow up of his right shoulder chornic pain  He stats the pain is improved with the Naproxen and Flexeril  He would like to review his Xray results which showed AC joint arthritis  He is accompanied by his son who states he will try to arrange PT appts for his father  Review of Systems   Constitutional: Negative for chills and fever  HENT: Negative for congestion, rhinorrhea and sore throat  Respiratory: Negative for cough and shortness of breath  Cardiovascular: Negative for chest pain  Gastrointestinal: Negative for diarrhea, nausea and vomiting  Skin: Negative for rash  Allergic/Immunologic: Positive for environmental allergies  Neurological: Negative for dizziness and headaches         Current Outpatient Medications on File Prior to Visit   Medication Sig   • atorvastatin (LIPITOR) 40 mg tablet Take 1 tablet (40 mg total) by mouth daily (Patient not taking: Reported on 11/30/2022)   • Blood Glucose Monitoring Suppl (OneTouch Verio) w/Device KIT Use 2 (two) times a day   • Empagliflozin (Jardiance) 25 MG TABS Take 1 tablet (25 mg total) by mouth daily   • glucose blood (OneTouch Verio) test strip Use as instructed   • Lancets (onetouch ultrasoft) lancets Use as instructed   • loratadine (CLARITIN) 10 mg tablet Take 1 tablet (10 mg total) by mouth daily   • metFORMIN (GLUCOPHAGE) 1000 MG tablet TAKE ONE TABLET BY MOUTH TWICE A DAY   • Multiple Vitamins-Minerals (CENTRUM ADULTS PO) Take by mouth in the morning SUPPLEMENT   • Multiple Vitamins-Minerals (OCUVITE-LUTEIN PO) Take 1 each by mouth daily   • tamsulosin (FLOMAX) 0 4 mg Take 1 capsule (0 4 mg total) by mouth daily with dinner   • triamcinolone (KENALOG) 0 1 % cream Apply topically 2 (two) times a day   • [DISCONTINUED] nystatin (MYCOSTATIN) cream Apply topically 2 (two) times a day       Objective     /60 (BP Location: Right arm, Patient Position: Sitting, Cuff Size: Standard)   Pulse 81   Temp (!) 97 °F (36 1 °C) (Temporal)   Resp 18   Wt 85 7 kg (189 lb)   SpO2 98%   BMI 32 44 kg/m²     Physical Exam  Vitals and nursing note reviewed  Constitutional:       Appearance: He is well-developed  HENT:      Head: Normocephalic and atraumatic  Cardiovascular:      Rate and Rhythm: Normal rate and regular rhythm  Heart sounds: Normal heart sounds  No murmur heard  Pulmonary:      Effort: Pulmonary effort is normal  No respiratory distress  Breath sounds: Normal breath sounds  Abdominal:      General: There is no distension  Palpations: Abdomen is soft  Musculoskeletal:      Right shoulder: No tenderness  Decreased range of motion  Normal strength  Left shoulder: Tenderness present  Decreased range of motion  Normal strength     Skin:     Capillary Refill: Capillary refill takes less than 2 seconds  Neurological:      General: No focal deficit present  Mental Status: He is alert and oriented to person, place, and time     Psychiatric:         Mood and Affect: Mood normal        Reymundo Bell MD

## 2022-11-29 NOTE — PROGRESS NOTES
Oral Surgery    Jersey Chen presents for Ext #6,7,10 and 11  He came to appt with his son and his son translated from [place] [facility]  Pt only speaks mandarin  [address], patient denies any changes  ASA Type 2 Significant for diabetes controlled with medications  HbA1c from 6/3/22 was 6 0  Blood glucose taken today with glucometer was 102 mg/dl  Patient took all his medications and had breakfast this morning  He has been having shoulder pain on his left shoulder  He is following up with his doctor about it and he is taking naproxen for it  Obtained a direct and personal consent  Risks and complications were explained  Pt agreed and consented  Consent scanned in doc center  Pre-Op BP WNL  Administered 3 carpules of 2 % Lidocaine w/ 1:100,000 epi and 1 carpule of 4% septocaine via infiltration of teeth 6,7,10,11  ? Adequate anesthesia obtained, reflected gingiva, elevated, and extracted teeth in the following order: 6,7,10, 11  Moderate heme throughout procedure  Achieved hemostasis with gauze and sutures  Socket irrigated, and 4 0 chromic gut sutures placed  Simple interrupted suture was placed over each extraction site  Upon dismissal, patient received POI, and gauze  Pt left ambulatory and satisfied  Plan is to remove all teeth and have complete dentures fabricated  Patient is aware he will be without teeth for at least 3 months  NV: Finish Maxillary EXT: [id number]     Nnv: LR mandibular EXT  Nnnv: LL mandibular EXT

## 2022-11-30 ENCOUNTER — OFFICE VISIT (OUTPATIENT)
Dept: UROLOGY | Facility: MEDICAL CENTER | Age: 62
End: 2022-11-30

## 2022-11-30 ENCOUNTER — TELEPHONE (OUTPATIENT)
Dept: UROLOGY | Facility: MEDICAL CENTER | Age: 62
End: 2022-11-30

## 2022-11-30 VITALS
SYSTOLIC BLOOD PRESSURE: 100 MMHG | HEART RATE: 88 BPM | HEIGHT: 64 IN | BODY MASS INDEX: 32.1 KG/M2 | DIASTOLIC BLOOD PRESSURE: 80 MMHG | WEIGHT: 188 LBS

## 2022-11-30 DIAGNOSIS — R97.20 ELEVATED PSA: Primary | ICD-10-CM

## 2022-11-30 DIAGNOSIS — R31.29 MICROHEMATURIA: Primary | ICD-10-CM

## 2022-11-30 LAB
SL AMB  POCT GLUCOSE, UA: 1000
SL AMB LEUKOCYTE ESTERASE,UA: NORMAL
SL AMB POCT BILIRUBIN,UA: NORMAL
SL AMB POCT BLOOD,UA: NORMAL
SL AMB POCT CLARITY,UA: CLEAR
SL AMB POCT COLOR,UA: YELLOW
SL AMB POCT KETONES,UA: NORMAL
SL AMB POCT NITRITE,UA: NORMAL
SL AMB POCT PH,UA: 5.5
SL AMB POCT SPECIFIC GRAVITY,UA: 1
SL AMB POCT URINE PROTEIN: NORMAL
SL AMB POCT UROBILINOGEN: 0.2

## 2022-11-30 NOTE — PATIENT INSTRUCTIONS
Will repeat PSA in 3 months, around February 25th  Please avoid any ejaculation 3 days prior to testing  If PSA remains elevated, will proceed with MRI for further evaluation

## 2022-11-30 NOTE — PROGRESS NOTES
11/30/2022      Chief Complaint   Patient presents with   • Elevated PSA         Assessment and Plan    58 y o  male managed by Dr Melinda Elias     1  Elevated PSA  · PSA: 2 1 (11/25/22)  · Previous PSAs: 0 9 (11/23/21)   · Denies family history   · PAUL today with smooth, mildly enlarged prostate without appreciable nodule  · Denies symptoms of acute infection such as prostatitis to attribute to elevation  · Given rise in PSA from last year to this year, will repeat PSA in 3 months  Reviewed activities to avoid 72 hours prior to testing  · Reviewed possible MRI for biopsy planning if PSA remains elevated  · Will call patient's son, Kia Aguirre, with results  Follow up pending these results  History of Present Illness  Jackelyn Peterson is a 58 y o  male here for evaluation of prostate cancer screening  Patient is accompanied today by his nephew for translation assistance  Previous benign work up of microscopic hematuria  Has noted significant improvement in his BPH symptoms with tamsulosin  Dr Melinda Elias last evaluated patient August of 2020 to and said that he could follow up p r n  with refills on the tamsulosin from his PCP  Patient presents today because he did have a in elevation in his PSA from last year to this year  Please see trend above  Patient denies any family history of prostate cancer  He is not sure if he participated in any activities to falsely elevate his values prior to testing  He does deny any perineal pain, dysuria, gross hematuria, fevers, or chills  He is agreeable to plan above  [place]: [phone]     Review of Systems   Constitutional: Negative for chills and fever  HENT: Negative  Respiratory: Negative  Cardiovascular: Negative  Gastrointestinal: Negative for abdominal pain, nausea and vomiting  Genitourinary: Negative for difficulty urinating, dysuria, flank pain, frequency, hematuria, scrotal swelling, testicular pain and urgency  Denies any perineal pain  Musculoskeletal: Negative  Skin: Negative  Neurological: Negative  AUA SYMPTOM SCORE    Flowsheet Row Most Recent Value   AUA SYMPTOM SCORE    How often have you had a sensation of not emptying your bladder completely after you finished urinating? 0   How often have you had to urinate again less than two hours after you finished urinating? 0   How often have you found you stopped and started again several times when you urinate? 1   How often have you found it difficult to postpone urination? 0   How often have you had a weak urinary stream? 0   How often have you had to push or strain to begin urination? 0   How many times did you most typically get up to urinate from the time you went to bed at night until the time you got up in the morning? 1   Quality of Life: If you were to spend the rest of your life with your urinary condition just the way it is now, how would you feel about that? --   AUA SYMPTOM SCORE 2           Vitals  Vitals:    11/30/22 0929   BP: 100/80   Pulse: 88   Weight: 85 3 kg (188 lb)   Height: 5' 4" (1 626 m)       Physical Exam  Vitals reviewed  Constitutional:       General: He is not in acute distress  Appearance: Normal appearance  He is obese  He is not ill-appearing, toxic-appearing or diaphoretic  HENT:      Head: Normocephalic and atraumatic  Eyes:      Conjunctiva/sclera: Conjunctivae normal    Pulmonary:      Effort: Pulmonary effort is normal  No respiratory distress  Abdominal:      General: There is no distension  Palpations: Abdomen is soft  Tenderness: There is no abdominal tenderness  There is no right CVA tenderness, left CVA tenderness, guarding or rebound  Genitourinary:     Comments: PAUL today with smooth, mildly enlarged prostate without appreciable nodule  Musculoskeletal:         General: Normal range of motion  Cervical back: Normal range of motion  Skin:     General: Skin is warm and dry     Neurological:      General: No focal deficit present  Mental Status: He is alert and oriented to person, place, and time  Psychiatric:         Mood and Affect: Mood normal          Behavior: Behavior normal          Thought Content: Thought content normal          Judgment: Judgment normal        Past History  Past Medical History:   Diagnosis Date   • Diabetes mellitus ([facility] )      Social History     Socioeconomic History   • Marital status: Single     Spouse name: None   • Number of children: None   • Years of education: None   • Highest education level: None   Occupational History   • None   Tobacco Use   • Smoking status: Former   • Smokeless tobacco: Never   Vaping Use   • Vaping Use: Never used   Substance and Sexual Activity   • Alcohol use: No   • Drug use: No   • Sexual activity: Not Currently   Other Topics Concern   • None   Social History Narrative    No Lutheran beliefs    Primary spoken language Mandarin     Social Determinants of Health     Financial Resource Strain: Low Risk    • Difficulty of Paying Living Expenses: Not hard at all   Food Insecurity: No Food Insecurity   • Worried About Running Out of Food in the Last Year: Never true   • Ran Out of Food in the Last Year: Never true   Transportation Needs: No Transportation Needs   • Lack of Transportation (Medical): No   • Lack of Transportation (Non-Medical): No   Physical Activity: Not on file   Stress: Not on file   Social Connections: Not on file   Intimate Partner Violence: Not on file   Housing Stability: Not on file     Social History     Tobacco Use   Smoking Status Former   Smokeless Tobacco Never     No family history on file      The following portions of the patient's history were reviewed and updated as appropriate: allergies, current medications, past medical history, past social history, past surgical history and problem list     Results  Recent Results (from the past 1 hour(s))   POCT urine dip auto non-scope    Collection Time: 11/30/22  9:55 AM   Result Value Ref Range     COLOR,UA yellow     CLARITY,UA clear     SPECIFIC GRAVITY,UA 1 005      PH,UA 5 5     LEUKOCYTE ESTERASE,UA neg     NITRITE,UA neg     GLUCOSE, UA 1,000     KETONES,UA neg     BILIRUBIN,UA neg     BLOOD,UA trace-intact     POCT URINE PROTEIN neg     SL AMB POCT UROBILINOGEN 0 2    ]  Lab Results   Component Value Date    PSA 2 1 11/25/2022    PSA 0 9 11/23/2021     Lab Results   Component Value Date    GLUCOSE 223 (H) 01/14/2014    CALCIUM 8 8 07/20/2022     01/14/2014    K 4 0 07/20/2022    CO2 24 07/20/2022     (H) 07/20/2022    BUN 17 07/20/2022    CREATININE 1 03 07/20/2022     Lab Results   Component Value Date    WBC 8 09 07/07/2021    HGB 13 5 07/07/2021    HCT 41 9 07/07/2021    MCV 90 07/07/2021     07/07/2021     Joshua Guy PA-C

## 2022-12-04 ENCOUNTER — PATIENT OUTREACH (OUTPATIENT)
Dept: FAMILY MEDICINE CLINIC | Facility: CLINIC | Age: 62
End: 2022-12-04

## 2022-12-04 NOTE — PROGRESS NOTES
Late Note from 12/2 [facility] received a new referral  In regard to pt that needs assistance with transportation  [facility] attempted to reach pt using [employer] services,  #[id number]  I was unable to reach pt and a message was left to please return [facility] call  [facility] will remain available

## 2022-12-05 ENCOUNTER — EVALUATION (OUTPATIENT)
Dept: PHYSICAL THERAPY | Facility: REHABILITATION | Age: 62
End: 2022-12-05

## 2022-12-05 DIAGNOSIS — G89.29 CHRONIC LEFT SHOULDER PAIN: ICD-10-CM

## 2022-12-05 DIAGNOSIS — M25.512 CHRONIC LEFT SHOULDER PAIN: ICD-10-CM

## 2022-12-05 NOTE — PROGRESS NOTES
PT Evaluation     Today's date: 2022  Patient name: Brandi Sinha  : 1960  MRN: [id number]  Referring provider: Alejandra Mulligan MD  Dx:   Encounter Diagnosis     ICD-10-CM    1  Chronic left shoulder pain  M25 512 Ambulatory Referral to Physical Therapy    G89 29                      Assessment  Assessment details: Pt, Brandi Sinha, is a 58 y o  male presenting to physical therapy on this date for an initial evaluation  Pt was accompanied by his son to today's visit who helped serve as an  for mandarin  Upon eval on this date, pt presented with decreased L shoulder ROM, decreased LUE strength, and overall impaired tolerance to functional activities  Pt was provided with initial HEP which was unable to be translated unfortunately but pt did verbalize understanding with the photos as well as after his son translated the instruction to him  Pt was also educated on use of heat at home to help his muscle tightness and promote relaxation, pt verbalized understanding  At this time, pt would benefit from skilled OP PT to work on deficits listed above and improve overall functional mobility with decreased reports of pain and compensation patterns   Thank you for this referral    Impairments: abnormal or restricted ROM, abnormal movement, activity intolerance, impaired physical strength, lacks appropriate home exercise program, pain with function, poor posture  and poor body mechanics    Goals  STG:   Pt will be able to don/doff jacket with pain </= 2/10 in 2 weeks   Pt will demonstrate 25% improvements in ROM in 4 weeks   Pt will be I with initial HEP to progress towards independence with exercise     LTG:   Pt will be able to dress independently without pain by d/c   Pt will improve L shoulder ROM to [facility] by d/c for increased ease and safety with ADLs and overall functional mobility   Pt will improve L shoulder strength by d/c for increased ease and safety with functional mobility and activities   Pt will be I with modified/updated HEP and demonstrate ability to complete with confidence and proper mechanics/form to safely be d/c from skilled OP PT and continue with exercises on their own     Plan  Patient would benefit from: PT eval and skilled physical therapy  Planned modality interventions: thermotherapy: hydrocollator packs  Planned therapy interventions: body mechanics training, functional ROM exercises, home exercise program, therapeutic exercise, therapeutic activities, stretching, strengthening, postural training, patient education, neuromuscular re-education, manual therapy, joint mobilization and IADL retraining  Frequency: 2x week  Duration in visits: 8  Duration in weeks: 4  Treatment plan discussed with: patient and family        Subjective Evaluation    History of Present Illness  Mechanism of injury: Pt is accompanied by his son to today's session to help interpret  Pt reports that his L shoulder has been bothering him for the past 2-3 months but he is unable to remember why it started hurting  Pt reports that if he is sleeping or extended his shoulder will hurt him  Pt denies any n/t in his shoulder  He reports that his pain is constant  Pt reports that he may have hurt his shoulder "back in the day" but doesn't remember  Yuval Salmonper reports that his shoulder hurts getting dressed and raising his arm over head  Pt reports that the top of his shoulder feels very tight  Pain  Current pain rating: 3  At best pain rating: 3  At worst pain rating: 10  Quality: sharp, tight, discomfort and dull ache  Aggravating factors: overhead activity and lifting    Hand dominance: right    Treatments  Previous treatment: medication  Patient Goals  Patient goals for therapy: decreased pain, increased motion, increased strength and independence with ADLs/IADLs          Objective     Palpation   Left   No palpable tenderness to the biceps, posterior deltoid, rhomboids and triceps  Hypertonic in the upper trapezius  Tenderness of the anterior deltoid, levator scapulae, supraspinatus and upper trapezius  Tenderness     Left Shoulder   Tenderness in the [facility] joint, acromion and bicipital groove  Active Range of Motion   Left Shoulder   Flexion: 155 degrees   Abduction: 90 degrees with pain  External rotation BTH: WFL  Internal rotation BTB: sacrum with pain    Right Shoulder   Flexion: WFL  Abduction: WFL  External rotation BTH: WFL  Internal rotation BTB: WFL    Strength/Myotome Testing     Left Shoulder     Planes of Motion   Flexion: 4+   Abduction: 4   External rotation at 0°: 4+   Internal rotation at 0°: 4     Isolated Muscles   Biceps: 4   Triceps: 5     Right Shoulder     Planes of Motion   Flexion: 5   Abduction: 5   External rotation at 0°: 5   Internal rotation at 0°: 5     Isolated Muscles   Biceps: 5   Triceps: 5     Tests     Left Shoulder   Positive empty can, full can, Neer's, painful arc and Speed's  Negative Hawkin's                Precautions: T2D      12/5            FOTO X            IE/RE IE              Manuals             L shoulder PROM             L shoulder LAD gentle                                       Neuro Re-Ed             L shoulder isometrics              scap retractions  demo                                                                             Ther Ex             Pulley's with MHP             Seated UT stretch demo            Wall slide flexion  demo            Doorway pec stretch below 90*             Table slide flex, abd             SL ER             Table ER stretch             TB ext              TB Row                                                                 Modalities             P

## 2022-12-07 ENCOUNTER — VBI (OUTPATIENT)
Dept: ADMINISTRATIVE | Facility: OTHER | Age: 62
End: 2022-12-07

## 2022-12-09 PROBLEM — M25.512 CHRONIC LEFT SHOULDER PAIN: Status: ACTIVE | Noted: 2022-12-09

## 2022-12-09 PROBLEM — M19.012 ARTHRITIS OF LEFT ACROMIOCLAVICULAR JOINT: Status: ACTIVE | Noted: 2022-12-09

## 2022-12-09 PROBLEM — G89.29 CHRONIC LEFT SHOULDER PAIN: Status: ACTIVE | Noted: 2022-12-09

## 2022-12-10 NOTE — ASSESSMENT & PLAN NOTE
Refilled the Naproxen and Flexeril  Referred again to Physical Therapy  May also use Voltaren gel  Ice, Heat, Massage, Stretches

## 2022-12-12 ENCOUNTER — OFFICE VISIT (OUTPATIENT)
Dept: PHYSICAL THERAPY | Facility: REHABILITATION | Age: 62
End: 2022-12-12

## 2022-12-12 DIAGNOSIS — M25.512 CHRONIC LEFT SHOULDER PAIN: Primary | ICD-10-CM

## 2022-12-12 DIAGNOSIS — G89.29 CHRONIC LEFT SHOULDER PAIN: Primary | ICD-10-CM

## 2022-12-12 NOTE — PROGRESS NOTES
Daily Note     Today's date: 2022  Patient name: Nathalie Hansen  : 1960  MRN: [id number]  Referring provider: Michelle Alaniz MD  Dx:   Encounter Diagnosis     ICD-10-CM    1  Chronic left shoulder pain  M25 512     G89 29                      Subjective: Pt is accompanied by his son to today's appointment to help with translation  Pt reports that his shoulder is bothersome and he is still having trouble sleeping  He reports that he did try his exercises at home  Objective: See treatment diary below      Assessment: Pt tolerated treatment well  Pt completed his first follow up since his IE on this date, pt completed all exercises well and with appropriate cueing throughout  Decreased pain noted post manuals  Pt was provided with updated HEP for levator scapulae stretch as well as SL ER  HEP was reviewed with patient and his son, both verbalized understanding  Pt was educated on potential for post-treatment soreness and importance of letting therapist know at next visit how he felt after today so that POC can be adjusted as needed, pt and his son verbalized understanding  Patient demonstrated fatigue post treatment, exhibited good technique with therapeutic exercises and would benefit from continued PT  Plan: Continue per plan of care  Progress treatment as tolerated         Precautions: T2D                 FOTO X            IE/RE IE              Manuals             L shoulder PROM  ML           L shoulder LAD gentle                                       Neuro Re-Ed             L shoulder isometrics              scap retractions  demo 5"x10                                                                            Ther Ex             Pulley's with MHP  3 mins no MHP           Seated UT stretch demo 15"x4            Wall slide flexion  demo 5"x10 flex    abd 5"x5           Doorway pec stretch below 90*  LS stretch 15"x4 ea           Table slide flex, abd             SL ER  5"x10 Table ER stretch             TB ext              TB Row                                                                 Modalities             P

## 2022-12-15 ENCOUNTER — OFFICE VISIT (OUTPATIENT)
Dept: PHYSICAL THERAPY | Facility: REHABILITATION | Age: 62
End: 2022-12-15

## 2022-12-15 DIAGNOSIS — M25.512 CHRONIC LEFT SHOULDER PAIN: Primary | ICD-10-CM

## 2022-12-15 DIAGNOSIS — G89.29 CHRONIC LEFT SHOULDER PAIN: Primary | ICD-10-CM

## 2022-12-15 NOTE — PROGRESS NOTES
Daily Note     Today's date: 12/15/2022  Patient name: Slick Stark  : 1960  MRN: [id number]  Referring provider: Oleg Camacho MD  Dx:   Encounter Diagnosis     ICD-10-CM    1  Chronic left shoulder pain  M25 512     G89 29                      Subjective: Pt reports that after last session he felt okay but then yesterday he had pain  Pts son was present again today for assistance with translation  Objective: See treatment diary below      Assessment: Pt tolerated treatment well  Improved ROM noted throughout exercises on this date, pt noted discomfort at end range during PROM  Pt was able to complete remainder of exercises well without issue or reports of increased pain  Continue to progress program as able to promote increased ROM and strength as able to demonstrate safe return to PLOF  Patient demonstrated fatigue post treatment, exhibited good technique with therapeutic exercises and would benefit from continued PT  Plan: Continue per plan of care  Progress treatment as tolerated         Precautions: T2D      12/5 12/12 12/15          FOTO X            IE/RE IE              Manuals             L shoulder PROM  ML ML          L shoulder LAD gentle                                       Neuro Re-Ed             L shoulder isometrics              scap retractions  demo 5"x10 5"x15                                                                           Ther Ex             Pulley's with MHP  3 mins no MHP 5 min          Seated UT stretch demo 15"x4  15"x4          Wall slide flexion  demo 5"x10 flex    abd 5"x5 5"x10 flex    abd 5"x5          Doorway pec stretch below 90*  LS stretch 15"x4 ea LS stretch 15"x4 ea          Table slide flex, abd   seated AAROM flex, abd, ER x10 ea          SL ER  5"x10           Table ER stretch             TB ext              TB Row                                                                 Modalities             MHP

## 2022-12-19 ENCOUNTER — OFFICE VISIT (OUTPATIENT)
Dept: PHYSICAL THERAPY | Facility: REHABILITATION | Age: 62
End: 2022-12-19

## 2022-12-19 DIAGNOSIS — M25.512 CHRONIC LEFT SHOULDER PAIN: Primary | ICD-10-CM

## 2022-12-19 DIAGNOSIS — G89.29 CHRONIC LEFT SHOULDER PAIN: Primary | ICD-10-CM

## 2022-12-19 NOTE — PROGRESS NOTES
Daily Note     Today's date: 2022  Patient name: Samira Lund  : 1960  MRN: [id number]  Referring provider: Sylvia Hinton MD  Dx:   Encounter Diagnosis     ICD-10-CM    1  Chronic left shoulder pain  M25 512     G89 29                      Subjective: Pt is accompanied to therapy by his son on this date for assistance with translation  Pt reports that his shoulder is about 10% better slowly but surely  He reports that he slept with heat on his shoulder the past 2 nights and it was better  Objective: See treatment diary below      Assessment: Pt tolerated treatment well  Pt demonstrated improved tolerance to ROM on this date with only noted discomfort near end of available range  Pt demonstrated improved tolerance to passive ER with posterior overpressure  Posterior mob included into POC to help with pain control and improved ROM, pt tolerated well without any adverse reactions  Patient demonstrated fatigue post treatment, exhibited good technique with therapeutic exercises and would benefit from continued PT  Plan: Continue per plan of care  Progress treatment as tolerated         Precautions: T2D      12/5 12/12 12/15 12/19         FOTO X            IE/RE IE              Manuals             L shoulder PROM  ML ML ML         L shoulder LAD gentle    Post mob Gr I-II ML                                   Neuro Re-Ed             L shoulder isometrics              scap retractions  demo 5"x10 5"x15                                                                           Ther Ex             Pulley's with MHP  3 mins no MHP 5 min 5 min         Seated UT stretch demo 15"x4  15"x4 HEP         Wall slide flexion  demo 5"x10 flex    abd 5"x5 5"x10 flex    abd 5"x5 5"x10 flex    abd 5"x5         Doorway pec stretch below 90*  LS stretch 15"x4 ea LS stretch 15"x4 ea HEP         Table slide flex, abd   seated AAROM flex, abd, ER x10 ea seated AAROM flex, abd, ER x10 ea         SL ER  5"x10 Table ER stretch             TB ext     Red TB 5"x10         TB Row    Red TB 5"x10                                                             Modalities             P

## 2022-12-22 ENCOUNTER — OFFICE VISIT (OUTPATIENT)
Dept: PHYSICAL THERAPY | Facility: REHABILITATION | Age: 62
End: 2022-12-22

## 2022-12-22 DIAGNOSIS — M25.512 CHRONIC LEFT SHOULDER PAIN: Primary | ICD-10-CM

## 2022-12-22 DIAGNOSIS — G89.29 CHRONIC LEFT SHOULDER PAIN: Primary | ICD-10-CM

## 2022-12-22 NOTE — PROGRESS NOTES
Daily Note     Today's date: 2022  Patient name: Rob Rankin  : 1960  MRN: [id number]  Referring provider: Theora Eisenmenger, MD  Dx:   Encounter Diagnosis     ICD-10-CM    1  Chronic left shoulder pain  M25 512     G89 29                      Subjective: Pt reports that his arm feels better after therapy but the relief does not last for him  Pts son is here to assist with translation for today's visit  Objective: See treatment diary below      Assessment: Pt tolerated treatment well  Pt tolerated exercise well with decreased pain reported post exercises and manuals on this date  Pt was educated to continue with heat and stretches at home  Pt and his son were also educated that if his symptoms do not improve or if they worsen to contact referring provider again and discuss possible ortho consult  Patient demonstrated fatigue post treatment, exhibited good technique with therapeutic exercises and would benefit from continued PT  Plan: Continue per plan of care  Progress treatment as tolerated         Precautions: T2D      12/5 12/12 12/15 12/19 12/22        FOTO X            IE/RE IE              Manuals             L shoulder PROM  ML ML ML ML        L shoulder LAD gentle    Post mob Gr I-II ML Post and inf mob Gr II  ML                                  Neuro Re-Ed             L shoulder isometrics              scap retractions  demo 5"x10 5"x15                                                                           Ther Ex             Pulley's with MHP  3 mins no MHP 5 min 5 min 5 min        Seated UT stretch demo 15"x4  15"x4 HEP         Wall slide flexion  demo 5"x10 flex    abd 5"x5 5"x10 flex    abd 5"x5 5"x10 flex    abd 5"x5         Doorway pec stretch below 90*  LS stretch 15"x4 ea LS stretch 15"x4 ea HEP Supine pec stretch 15"x3 at 60 deg        Table slide flex, abd   seated AAROM flex, abd, ER x10 ea seated AAROM flex, abd, ER x10 ea seated AAROM flex, abd, ER x10 ea        SL ER  5"x10   Pink TB ER 5"x15        Table ER stretch     Pink TB add 5"x15        TB ext     Red TB 5"x10 Pink TB 5" x15        TB Row    Red TB 5"x10 Pink TB 5"x15                                                            Modalities             P

## 2022-12-27 ENCOUNTER — OFFICE VISIT (OUTPATIENT)
Dept: PHYSICAL THERAPY | Facility: REHABILITATION | Age: 62
End: 2022-12-27

## 2022-12-27 DIAGNOSIS — M25.512 CHRONIC LEFT SHOULDER PAIN: Primary | ICD-10-CM

## 2022-12-27 DIAGNOSIS — G89.29 CHRONIC LEFT SHOULDER PAIN: Primary | ICD-10-CM

## 2022-12-27 NOTE — PROGRESS NOTES
Daily Note     Today's date: 2022  Patient name: Chrystal Mccauley  : 1960  MRN: [id number]  Referring provider: Fran Cheek MD  Dx:   Encounter Diagnosis     ICD-10-CM    1  Chronic left shoulder pain  M25 512     G89 29                      Subjective: Pt reports that after last session his arm felt better and it felt much better last night when he was sleeping  Objective: See treatment diary below      Assessment: Pt tolerated treatment well  Pt did well increased resistance with TB exercises on this date  Improved passive and active ROM noted on this date with improved posterior glide of shoulder as well during manuals  Patient demonstrated fatigue post treatment, exhibited good technique with therapeutic exercises and would benefit from continued PT  Plan: Continue per plan of care  Progress treatment as tolerated         Precautions: T2D      12/5 12/12 12/15 12/19 12/22 12/27       FOTO X            IE/RE IE              Manuals             L shoulder PROM  ML ML ML ML ML       L shoulder LAD gentle    Post mob Gr I-II ML Post and inf mob Gr II  ML Post and inf mob Gr II-III  ML                                 Neuro Re-Ed             L shoulder isometrics              scap retractions  demo 5"x10 5"x15                                                                           Ther Ex             [place]ulley's with MHP  3 mins no MHP 5 min 5 min 5 min 5 min       Seated UT stretch demo 15"x4  15"x4 HEP  UBE NV        Wall slide flexion  demo 5"x10 flex    abd 5"x5 5"x10 flex    abd 5"x5 5"x10 flex    abd 5"x5         Doorway pec stretch below 90*  LS stretch 15"x4 ea LS stretch 15"x4 ea HEP Supine pec stretch 15"x3 at 60 deg        Table slide flex, abd   seated AAROM flex, abd, ER x10 ea seated AAROM flex, abd, ER x10 ea seated AAROM flex, abd, ER x10 ea seated AAROM flex, abd, ER x10 ea       SL ER  5"x10   [place] TB ER 5"x15 Green TB ER 5"x15       Table ER stretch     Pink TB add 5"x15 Green TB add 5"x15       TB ext     Red TB 5"x10 Pink TB 5" x15 Green TB 5"x15       TB Row    Red TB 5"x10 Pink TB 5"x15 Green TB 5"x15                                                           Modalities             P

## 2022-12-28 ENCOUNTER — PATIENT OUTREACH (OUTPATIENT)
Dept: FAMILY MEDICINE CLINIC | Facility: CLINIC | Age: 62
End: 2022-12-28

## 2022-12-28 DIAGNOSIS — Z74.8 ASSISTANCE NEEDED WITH TRANSPORTATION: Primary | ICD-10-CM

## 2022-12-28 NOTE — PROGRESS NOTES
Per chart review this seems that the  CM Dinorah Castañeda attempted to reach out Pt while CARLIE GUERRERO was on leave using [employer] services regarding Pt needs assistance with transportation  Mary Maravilla was unable to contact Pt  After chart review CARLIE GUERRERO placed a call to Pt to follow up using [employer] services as well,  # [phone]  CARLIE GUERRERO introduced herself, her role and explained Pt the purpose of this call  Pt seems understanding   CARLIE GUERRERO asked Pt if he still interested in applying for [place] [id number] service  Pt stated that he wants to apply for transportation  CARLIE GUERRERO explained Pt that she will place a referral to [facility] to assist him to apply for the service mentioned above  Also, CARLIE GUERRERO asked Pt if there is other social needs that he would like to share in order to assist him with  Pt denied other social needs at this time  Also, CARLIE GUERRERO provided Pt with her name and phone number, Pt is aware that he can contact the CARLIE GUERRERO for future support and [facility] will reach out him once receives the referral  Pt seems understanding  CARLIE GUERRERO is remain available for further assistance as needed  CARLIE GUERRERO will continue to follow

## 2022-12-29 ENCOUNTER — OFFICE VISIT (OUTPATIENT)
Dept: PHYSICAL THERAPY | Facility: REHABILITATION | Age: 62
End: 2022-12-29

## 2022-12-29 DIAGNOSIS — G89.29 CHRONIC LEFT SHOULDER PAIN: Primary | ICD-10-CM

## 2022-12-29 DIAGNOSIS — M25.512 CHRONIC LEFT SHOULDER PAIN: Primary | ICD-10-CM

## 2022-12-29 NOTE — PROGRESS NOTES
Daily Note     Today's date: 2022  Patient name: Dee Record  : 1960  MRN: [id number]  Referring provider: Dalton Schulz MD  Dx:   Encounter Diagnosis     ICD-10-CM    1  Chronic left shoulder pain  M25 512     G89 29                      Subjective: Patient noted soreness in L shoulder in the morning feels better now  Over all patient noted that he is feeling better since starting physical therapy  Objective: See treatment diary below      Assessment: Tolerated treatment fair  VC to decrease upper trap compensation with standing TB rows  Patient noted some discomfort pain with UBE, patient's son instructed patient to go slower with his speed and then patient was able to perform but noted post UBE some soreness in Bilateral UE  PT ML performed mobs today  Patient would benefit from continued PT      Plan: Continue per plan of care        Precautions: T2D      12/5 12/12 12/15 12/19 12/22 12/27 12/29      FOTO X            IE/RE IE              Manuals             L shoulder PROM  ML ML ML ML ML AC      L shoulder LAD gentle    Post mob Gr I-II ML Post and inf mob Gr II  ML Post and inf mob Gr II-III   ML Post and inf mob Gr II-III   ML                                Neuro Re-Ed             L shoulder isometrics              scap retractions  demo 5"x10 5"x15                                                                           Ther Ex             Pulley's with MHP  3 mins no MHP 5 min 5 min 5 min 5 min 5 min      Seated UT stretch demo 15"x4  15"x4 HEP  UBE NV  3' fwd 3' bkw      Wall slide flexion  demo 5"x10 flex    abd 5"x5 5"x10 flex    abd 5"x5 5"x10 flex    abd 5"x5         Doorway pec stretch below 90*  LS stretch 15"x4 ea LS stretch 15"x4 ea HEP Supine pec stretch 15"x3 at 60 deg        Table slide flex, abd   seated AAROM flex, abd, ER x10 ea seated AAROM flex, abd, ER x10 ea seated AAROM flex, abd, ER x10 ea seated AAROM flex, abd, ER x10 ea seated AAROM flex, abd, ER x10 ea SL ER  5"x10   [place] TB ER 5"x15 Green TB ER 5"x15 Green TB ER 5"x15      Table ER stretch     Pink TB add 5"x15 Green TB add 5"x15 Green TB add 5"x15      TB ext     Red TB 5"x10 Pink TB 5" x15 Green TB 5"x15 GTB 5" x 15      TB Row    Red TB 5"x10 Pink TB 5"x15 Green TB 5"x15 GTB 5" x 15                                                          Modalities             MHP

## 2023-01-03 ENCOUNTER — OFFICE VISIT (OUTPATIENT)
Dept: DENTISTRY | Facility: CLINIC | Age: 63
End: 2023-01-03

## 2023-01-03 VITALS — HEART RATE: 107 BPM | DIASTOLIC BLOOD PRESSURE: 65 MMHG | SYSTOLIC BLOOD PRESSURE: 106 MMHG | TEMPERATURE: 97.8 F

## 2023-01-03 DIAGNOSIS — K08.89 NON-RESTORABLE TOOTH: Primary | ICD-10-CM

## 2023-01-03 NOTE — PROGRESS NOTES
Oral Surgery    Nathalie Jade presents for Ext #12, 13, 14, 16    He came to appt with his son and his son translated from [place] to [facility]  Pt only speaks mandarin  [address], patient denies any changes  ASA Type 2 Significant for diabetes controlled with medications  HbA1c from 6/3/22 was 6 0  PT had eaten before his appointment  Obtained a direct and personal consent  Risks and complications were explained  Pt agreed and consented  Consent scanned in doc center  Pre-Op BP WNL  Administered 3 cc of 2 % Lidocaine w/ 1:100,000 epi via B/L infiltration on teeth 12,13,14,16   ? Adequate anesthesia obtained, reflected gingiva, elevated, and extracted #12,13,14,16   Socket irrigated, removed granulation tissue, bone file and ronguers were used to remove sharp bone   4 0 chromic gut sutures placed as a continuous suture     Upon dismissal, patient received POI, ice, gauze, and RX: advil and tylenol OTC ensuring he does not exceed daily maximum dose on box    NV: extraction of entire lower arch (entire bridge is connected and bilateral anaesthesia okay as per dr Marysol Guy)

## 2023-01-04 ENCOUNTER — OFFICE VISIT (OUTPATIENT)
Dept: PHYSICAL THERAPY | Facility: REHABILITATION | Age: 63
End: 2023-01-04

## 2023-01-04 DIAGNOSIS — M25.512 CHRONIC LEFT SHOULDER PAIN: Primary | ICD-10-CM

## 2023-01-04 DIAGNOSIS — G89.29 CHRONIC LEFT SHOULDER PAIN: Primary | ICD-10-CM

## 2023-01-04 NOTE — PROGRESS NOTES
Daily Note     Today's date: 2023  Patient name: Keesha Jason  : 1960  MRN: [id number]  Referring provider: Josh Oliveira MD  Dx:   Encounter Diagnosis     ICD-10-CM    1  Chronic left shoulder pain  M25 512     G89 29                      Subjective: Pt reports that he is feeling better and he had no complaints after last session  Objective: See treatment diary below      Assessment: Pt tolerated treatment well  Pt reported most discomfort with PROM ER which was decreased with posterior mobilization of the shoulder  Pt tolerated remainder of exercises well and without complaints  Patient demonstrated fatigue post treatment, exhibited good technique with therapeutic exercises and would benefit from continued PT  Plan: Continue per plan of care  Progress treatment as tolerated         Precautions: T2D      12/5 12/12 12/15 12/19 12/22 12/27 12/29 1/4     FOTO X            IE/RE IE              Manuals             L shoulder PROM  ML ML ML ML ML AC ML     L shoulder LAD gentle    Post mob Gr I-II ML Post and inf mob Gr II  ML Post and inf mob Gr II-III   ML Post and inf mob Gr II-III   ML ML                               Neuro Re-Ed             L shoulder isometrics              scap retractions  demo 5"x10 5"x15                                                                           Ther Ex             Pulley's with MHP  3 mins no MHP 5 min 5 min 5 min 5 min 5 min 5 min     Seated UT stretch demo 15"x4  15"x4 HEP  UBE NV  3' fwd 3' bkw 3'/3'     Wall slide flexion  demo 5"x10 flex    abd 5"x5 5"x10 flex    abd 5"x5 5"x10 flex    abd 5"x5         Doorway pec stretch below 90*  LS stretch 15"x4 ea LS stretch 15"x4 ea HEP Supine pec stretch 15"x3 at 60 deg        Table slide flex, abd   seated AAROM flex, abd, ER x10 ea seated AAROM flex, abd, ER x10 ea seated AAROM flex, abd, ER x10 ea seated AAROM flex, abd, ER x10 ea seated AAROM flex, abd, ER x10 ea seated AAROM flex, abd, ER x10 ea SL ER  5"x10   [place] TB ER 5"x15 Green TB ER 5"x15 Green TB ER 5"x15 Green TB ER 5"x15     Table ER stretch     Pink TB add 5"x15 Green TB add 5"x15 Green TB add 5"x15 Green TB add 5"x20     TB ext     Red TB 5"x10 Pink TB 5" x15 Green TB 5"x15 GTB 5" x 15 GTB 5"x20     TB Row    Red TB 5"x10 Pink TB 5"x15 Green TB 5"x15 GTB 5" x 15 GTB 5"x20                                                         Modalities             MHP

## 2023-01-05 ENCOUNTER — OFFICE VISIT (OUTPATIENT)
Dept: PHYSICAL THERAPY | Facility: REHABILITATION | Age: 63
End: 2023-01-05

## 2023-01-05 DIAGNOSIS — M25.512 CHRONIC LEFT SHOULDER PAIN: Primary | ICD-10-CM

## 2023-01-05 DIAGNOSIS — G89.29 CHRONIC LEFT SHOULDER PAIN: Primary | ICD-10-CM

## 2023-01-05 NOTE — PROGRESS NOTES
Daily Note     Today's date: 2023  Patient name: Kerry Armstrong  : 1960  MRN: [id number]  Referring provider: Claudio Lares MD  Dx:   Encounter Diagnosis     ICD-10-CM    1  Chronic left shoulder pain  M25 512     G89 29                      Subjective: Pt reports that he is feeling good  Objective: See treatment diary below      Assessment: Pt ttolerated treatment well  Pt demonstrated most restriction with AAROM abduction on this date with decreasing ROM observed as repetitions increased  ROM improved post manuals  Patient demonstrated fatigue post treatment, exhibited good technique with therapeutic exercises and would benefit from continued PT  Plan: RE to be completed next visit        Precautions: T2D      12/5 12/12 12/15 12/19 12/22 12/27 12/29 1/4 1/5    FOTO X            IE/RE IE              Manuals             L shoulder PROM  ML ML ML ML ML AC ML ML    L shoulder LAD gentle    Post mob Gr I-II ML Post and inf mob Gr II  ML Post and inf mob Gr II-III   ML Post and inf mob Gr II-III   ML ML ML Gr III                              Neuro Re-Ed             L shoulder isometrics              scap retractions  demo 5"x10 5"x15                                                                           Ther Ex             Pulley's with MHP  3 mins no MHP 5 min 5 min 5 min 5 min 5 min 5 min 5 min    Seated UT stretch demo 15"x4  15"x4 HEP  UBE NV  3' fwd 3' bkw 3'/3' L2 5 3'/3'    Wall slide flexion  demo 5"x10 flex    abd 5"x5 5"x10 flex    abd 5"x5 5"x10 flex    abd 5"x5         Doorway pec stretch below 90*  LS stretch 15"x4 ea LS stretch 15"x4 ea HEP Supine pec stretch 15"x3 at 60 deg        Table slide flex, abd   seated AAROM flex, abd, ER x10 ea seated AAROM flex, abd, ER x10 ea seated AAROM flex, abd, ER x10 ea seated AAROM flex, abd, ER x10 ea seated AAROM flex, abd, ER x10 ea seated AAROM flex, abd, ER x10 ea seated AAROM flex, abd, x10 ea    SL ER  5"x10   [place] TB ER 5"x15 Green TB ER 5"x15 Green TB ER 5"x15 Green TB ER 5"x15 Green TB ER 5"x20    Table ER stretch     Pink TB add 5"x15 Green TB add 5"x15 Green TB add 5"x15 Green TB add 5"x20 Green TB add 5"x20    TB ext     Red TB 5"x10 Pink TB 5" x15 Green TB 5"x15 GTB 5" x 15 GTB 5"x20 GTB 5"x20    TB Row    Red TB 5"x10 Pink TB 5"x15 Green TB 5"x15 GTB 5" x 15 GTB 5"x20 GTB 5"X20                                                        Modalities             MHP

## 2023-01-09 ENCOUNTER — EVALUATION (OUTPATIENT)
Dept: PHYSICAL THERAPY | Facility: REHABILITATION | Age: 63
End: 2023-01-09

## 2023-01-09 DIAGNOSIS — G89.29 CHRONIC LEFT SHOULDER PAIN: Primary | ICD-10-CM

## 2023-01-09 DIAGNOSIS — M25.512 CHRONIC LEFT SHOULDER PAIN: Primary | ICD-10-CM

## 2023-01-09 NOTE — PROGRESS NOTES
PT Re-evaluation    Today's date: 2023  Patient name: Shree Baron  : 1960  MRN: [id number]  Referring provider: Anil Coon MD  Dx:   Encounter Diagnosis     ICD-10-CM    1  Chronic left shoulder pain  M25 512     G89 [id number]                     Assessment  Assessment details: Upon RE on this date, pt presents with improved ROM, strength, and decreased reports of overall pain  Despite improvements, pt continues to be limited with his ability to sleep at night without increased pain and discomfort in the anterior aspect of his L shoulder on an occasional basis  Pt has demonstrated good understanding of his exercises throughout therapy and was progressing well with the exception of discomfort reported by him last night when he was sleeping  At this time, due to overall progress in therapy as well as consistent reports of pt that exercises improve his symptoms, pt will be placed on hold with formal OP PT to trial management of symptoms with HEP only  Pt was educated that if his pain would return he is more than welcome to contact PT office to return for further evaluation  Pt verbalized understanding  Pt was provided with updated HEP as well at TB for exercises       Impairments: abnormal or restricted ROM, abnormal movement, activity intolerance, impaired physical strength, lacks appropriate home exercise program, pain with function, poor posture  and poor body mechanics     Goals  STG:   Pt will be able to don/doff jacket with pain </= 2/10 in 2 weeks MET   Pt will demonstrate 25% improvements in ROM in 4 weeks MET   Pt will be I with initial HEP to progress towards independence with exercise MET     LTG:   Pt will be able to dress independently without pain by d/c NOT MET   Pt will improve L shoulder ROM to [facility] by d/c for increased ease and safety with ADLs and overall functional mobility CONT   Pt will improve L shoulder strength by d/c for increased ease and safety with functional mobility and activities CONT  Pt will be I with modified/updated HEP and demonstrate ability to complete with confidence and proper mechanics/form to safely be d/c from skilled OP PT and continue with exercises on their own  CONT    Plan  Patient would benefit from: PT eval and skilled physical therapy  Planned modality interventions: thermotherapy: hydrocollator packs  Planned therapy interventions: body mechanics training, functional ROM exercises, home exercise program, therapeutic exercise, therapeutic activities, stretching, strengthening, postural training, patient education, neuromuscular re-education, manual therapy, joint mobilization and IADL retraining  Frequency: 2x week  Duration in visits: 8  Duration in weeks: 4  Treatment plan discussed with: patient and family     Pt will be placed on hold from formal OP PT to trial management of symptoms with HEP  Pt was educated to contact PT office with any questions, concerns, or if his symptoms return, pt verbalized understanding          Subjective Evaluation     History of Present Illness  Mechanism of injury: Pt is accompanied by his son to today's session to help interpret  Pt reports that his L shoulder has been bothering him for the past 2-3 months but he is unable to remember why it started hurting  Pt reports that if he is sleeping or extended his shoulder will hurt him  Pt denies any n/t in his shoulder  He reports that his pain is constant  Pt reports that he may have hurt his shoulder "back in the day" but doesn't remember  Verline Salt reports that his shoulder hurts getting dressed and raising his arm over head  Pt reports that the top of his shoulder feels very tight  1/9/23  Pt reports that he has been using heat for his shoulder which feels good for it  He reports that he still gets discomfort when sleeping  Pt reports that overall since starting therapy he has been feeling good up until last night   Pt reports that his arm still bothers him a little bit when he puts his shirt on but overall not as bad as it did when he started therapy  Pain  Current pain rating: 3  At best pain rating: 3  At worst pain rating: 10  Quality: sharp, tight, discomfort and dull ache  Aggravating factors: overhead activity and lifting    1/9/23  Current: 2/10  At best: 0/10  At worst: 6-7/10  Aggs: arm up at his side, sleeping on his arm   Eases: moves it around, uses heat      Hand dominance: right    Treatments  Previous treatment: medication  Patient Goals  Patient goals for therapy: decreased pain, increased motion, increased strength and independence with ADLs/IADLs              Objective      Palpation   Left   No palpable tenderness to the biceps, posterior deltoid, rhomboids and triceps  Hypertonic in the upper trapezius  Tenderness of the anterior deltoid, levator scapulae, supraspinatus and upper trapezius       Tenderness      Left Shoulder   Tenderness in the [facility] joint, acromion and bicipital groove       1/9/23  Continued TTP in [facility] joint, acromion, and bicipital groove      Active Range of Motion   Left Shoulder   Flexion: 155 degrees   Abduction: 90 degrees with pain  External rotation BTH: [facility]  Internal rotation BTB: sacrum with pain    Right Shoulder   Flexion: WFL  Abduction: WFL  External rotation BTH: [facility]  Internal rotation BTB: [facility]    1/9/23  Left Shoulder   Flexion: 156  degrees   Abduction: 155 degrees, pt reported mild pain   External rotation BTH: WFL  Internal rotation BTB: sacrum with pain      Strength/Myotome Testing     Left Shoulder      Planes of Motion   Flexion: 4+   Abduction: 4   External rotation at 0°: 4+   Internal rotation at 0°: 4      Isolated Muscles   Biceps: 4   Triceps: 5     Right Shoulder      Planes of Motion   Flexion: 5   Abduction: 5   External rotation at 0°: 5   Internal rotation at 0°: 5      Isolated Muscles   Biceps: 5   Triceps: 5     1/9/23  Left Shoulder      Planes of Motion   Flexion: 5  Abduction: 4+  External rotation at 0°: 4+   Internal rotation at 0°: 4+ pt reported pain     Isolated Muscles   Biceps: 5  Triceps: 5     Tests      Left Shoulder   Positive empty can, full can, Neer's, painful arc and Speed's  Negative Hawkin's         Precautions: T2D      12/5 12/12 12/15 12/19 12/22 12/27 12/29 1/4 1/5 1/9   FOTO X         RE   IE/RE IE              Manuals             L shoulder PROM  ML ML ML ML ML AC ML ML    L shoulder LAD gentle    Post mob Gr I-II ML Post and inf mob Gr II  ML Post and inf mob Gr II-III   ML Post and inf mob Gr II-III   ML ML ML Gr III                              Neuro Re-Ed             L shoulder isometrics              scap retractions  demo 5"x10 5"x15                                                                           Ther Ex             Pulley's with MHP  3 mins no MHP 5 min 5 min 5 min 5 min 5 min 5 min 5 min 5 min   Seated UT stretch demo 15"x4  15"x4 HEP  UBE NV  3' fwd 3' bkw 3'/3' L2 5 3'/3' L3 3'/3'   Wall slide flexion  demo 5"x10 flex    abd 5"x5 5"x10 flex    abd 5"x5 5"x10 flex    abd 5"x5         Doorway pec stretch below 90*  LS stretch 15"x4 ea LS stretch 15"x4 ea HEP Supine pec stretch 15"x3 at 60 deg        Table slide flex, abd   seated AAROM flex, abd, ER x10 ea seated AAROM flex, abd, ER x10 ea seated AAROM flex, abd, ER x10 ea seated AAROM flex, abd, ER x10 ea seated AAROM flex, abd, ER x10 ea seated AAROM flex, abd, ER x10 ea seated AAROM flex, abd, x10 ea    SL ER  5"x10   [place] TB ER 5"x15 Green TB ER 5"x15 Green TB ER 5"x15 Green TB ER 5"x15 Green TB ER 5"x20 Blue TB ER 5"x20   Table ER stretch     Pink TB add 5"x15 Green TB add 5"x15 Green TB add 5"x15 Green TB add 5"x20 Green TB add 5"x20 Blue TB add 5"x20   TB ext     Red TB 5"x10 Pink TB 5" x15 Green TB 5"x15 GTB 5" x 15 GTB 5"x20 GTB 5"x20 Blue TB 5"x20   TB Row    Red TB 5"x10 Pink TB 5"x15 Green TB 5"x15 GTB 5" x 15 GTB 5"x20 GTB 5"X20 Blue TB 5"x20                                                       Modalities ANAHY

## 2023-01-11 ENCOUNTER — PATIENT OUTREACH (OUTPATIENT)
Dept: FAMILY MEDICINE CLINIC | Facility: CLINIC | Age: 63
End: 2023-01-11

## 2023-01-11 NOTE — PROGRESS NOTES
First Attempt  Outgoing Call  01/11/2023    [address] [place] [address] called Vickie Cunningham on this day regarding new referral from [id number] [address] [facility] to assist with [address] application  Yessica Stanley did not answer at this time  [address] [place] [address] left voicemail to please call back at his convinience  Next follow up was scheduled on 01/18/2022

## 2023-01-18 ENCOUNTER — PATIENT OUTREACH (OUTPATIENT)
Dept: FAMILY MEDICINE CLINIC | Facility: CLINIC | Age: 63
End: 2023-01-18

## 2023-01-18 NOTE — PROGRESS NOTES
Outgoing Call  01/18/2023    [facility] called Barby Green on this day regarding [employer] application  Barby Green did not answer at this time  [facility] left voicemail to please call back at his convenience  [facility] will continue to follow up  Next follow up was scheduled on 01/23/2023

## 2023-01-23 ENCOUNTER — PATIENT OUTREACH (OUTPATIENT)
Dept: FAMILY MEDICINE CLINIC | Facility: CLINIC | Age: 63
End: 2023-01-23

## 2023-01-23 NOTE — PROGRESS NOTES
Third Attempt  Outgoing Call  01/23/2023    [facility] called Queenie Nguyen on this day regarding [employer] application  Queenie Nguyen did not answer at this time  [facility] left voicemail to please call back '    [facility] will mail out contact letter  [facility] will continue to follow up  Next follow up was scheduled on 02/06/2023

## 2023-02-03 ENCOUNTER — APPOINTMENT (OUTPATIENT)
Dept: LAB | Age: 63
End: 2023-02-03

## 2023-02-03 ENCOUNTER — OFFICE VISIT (OUTPATIENT)
Dept: FAMILY MEDICINE CLINIC | Facility: CLINIC | Age: 63
End: 2023-02-03

## 2023-02-03 VITALS
TEMPERATURE: 98.7 F | OXYGEN SATURATION: 99 % | RESPIRATION RATE: 16 BRPM | HEIGHT: 64 IN | SYSTOLIC BLOOD PRESSURE: 120 MMHG | HEART RATE: 96 BPM | DIASTOLIC BLOOD PRESSURE: 80 MMHG | BODY MASS INDEX: 32.61 KG/M2 | WEIGHT: 191 LBS

## 2023-02-03 DIAGNOSIS — Z23 ENCOUNTER FOR IMMUNIZATION: ICD-10-CM

## 2023-02-03 DIAGNOSIS — J30.2 SEASONAL ALLERGIES: ICD-10-CM

## 2023-02-03 DIAGNOSIS — N52.9 ERECTILE DYSFUNCTION, UNSPECIFIED ERECTILE DYSFUNCTION TYPE: ICD-10-CM

## 2023-02-03 DIAGNOSIS — E11.9 TYPE 2 DIABETES MELLITUS WITHOUT COMPLICATION, WITHOUT LONG-TERM CURRENT USE OF INSULIN (HCC): Primary | ICD-10-CM

## 2023-02-03 DIAGNOSIS — G89.29 CHRONIC LEFT SHOULDER PAIN: ICD-10-CM

## 2023-02-03 DIAGNOSIS — E11.9 TYPE 2 DIABETES MELLITUS WITHOUT COMPLICATION, WITHOUT LONG-TERM CURRENT USE OF INSULIN (HCC): ICD-10-CM

## 2023-02-03 DIAGNOSIS — M25.512 CHRONIC LEFT SHOULDER PAIN: ICD-10-CM

## 2023-02-03 DIAGNOSIS — R97.20 ELEVATED PSA: ICD-10-CM

## 2023-02-03 PROBLEM — N18.2 CKD (CHRONIC KIDNEY DISEASE) STAGE 2, GFR 60-89 ML/MIN: Status: ACTIVE | Noted: 2023-02-03

## 2023-02-03 PROBLEM — L91.8 SKIN TAGS, MULTIPLE ACQUIRED: Status: RESOLVED | Noted: 2018-10-22 | Resolved: 2023-02-03

## 2023-02-03 PROBLEM — R21 RASH OF GENITAL AREA: Status: RESOLVED | Noted: 2021-10-04 | Resolved: 2023-02-03

## 2023-02-03 LAB
ALBUMIN SERPL BCP-MCNC: 3.9 G/DL (ref 3.5–5)
ALP SERPL-CCNC: 66 U/L (ref 46–116)
ALT SERPL W P-5'-P-CCNC: 26 U/L (ref 12–78)
ANION GAP SERPL CALCULATED.3IONS-SCNC: 6 MMOL/L (ref 4–13)
AST SERPL W P-5'-P-CCNC: 15 U/L (ref 5–45)
BILIRUB SERPL-MCNC: 0.5 MG/DL (ref 0.2–1)
BUN SERPL-MCNC: 13 MG/DL (ref 5–25)
CALCIUM SERPL-MCNC: 9.2 MG/DL (ref 8.3–10.1)
CHLORIDE SERPL-SCNC: 108 MMOL/L (ref 96–108)
CHOLEST SERPL-MCNC: 200 MG/DL
CO2 SERPL-SCNC: 26 MMOL/L (ref 21–32)
CREAT SERPL-MCNC: 0.96 MG/DL (ref 0.6–1.3)
CREAT UR-MCNC: 228 MG/DL
GFR SERPL CREATININE-BSD FRML MDRD: 84 ML/MIN/1.73SQ M
GLUCOSE P FAST SERPL-MCNC: 93 MG/DL (ref 65–99)
HDLC SERPL-MCNC: 39 MG/DL
LDLC SERPL CALC-MCNC: 115 MG/DL (ref 0–100)
MICROALBUMIN UR-MCNC: 60.8 MG/L (ref 0–20)
MICROALBUMIN/CREAT 24H UR: 27 MG/G CREATININE (ref 0–30)
POTASSIUM SERPL-SCNC: 3.9 MMOL/L (ref 3.5–5.3)
PROT SERPL-MCNC: 8.3 G/DL (ref 6.4–8.4)
PSA SERPL-MCNC: 1.1 NG/ML (ref 0–4)
SL AMB POCT HEMOGLOBIN AIC: 6 (ref ?–6.5)
SODIUM SERPL-SCNC: 140 MMOL/L (ref 135–147)
TRIGL SERPL-MCNC: 230 MG/DL

## 2023-02-03 RX ORDER — CYCLOBENZAPRINE HCL 10 MG
10 TABLET ORAL 3 TIMES DAILY PRN
Qty: 30 TABLET | Refills: 1 | Status: SHIPPED | OUTPATIENT
Start: 2023-02-03

## 2023-02-03 RX ORDER — SILDENAFIL 25 MG/1
25 TABLET, FILM COATED ORAL DAILY PRN
Qty: 10 TABLET | Refills: 0 | Status: SHIPPED | OUTPATIENT
Start: 2023-02-03

## 2023-02-03 RX ORDER — LORATADINE 10 MG/1
10 TABLET ORAL DAILY
Qty: 90 TABLET | Refills: 1 | Status: SHIPPED | OUTPATIENT
Start: 2023-02-03

## 2023-02-03 RX ORDER — VIT C/E/ZN/COPPR/LUTEIN/ZEAXAN 60 MG-6 MG
1 CAPSULE ORAL DAILY
Qty: 90 CAPSULE | Refills: 1 | Status: SHIPPED | OUTPATIENT
Start: 2023-02-03

## 2023-02-03 RX ORDER — MULTIVITAMIN/IRON/FOLIC ACID 18MG-0.4MG
1 TABLET ORAL DAILY
Qty: 90 TABLET | Refills: 1 | Status: SHIPPED | OUTPATIENT
Start: 2023-02-03

## 2023-02-03 RX ORDER — NAPROXEN 500 MG/1
500 TABLET ORAL 2 TIMES DAILY WITH MEALS
Qty: 60 TABLET | Refills: 1 | Status: SHIPPED | OUTPATIENT
Start: 2023-02-03

## 2023-02-03 NOTE — ASSESSMENT & PLAN NOTE
Improved with Physical Therapy  Will refer to Sports medicine for possible steroid injection  Refilled the Naproxen and Flexeril  May also use Voltaren gel  Ice, Heat, Massage, Stretches

## 2023-02-03 NOTE — ASSESSMENT & PLAN NOTE
Lab Results   Component Value Date    HGBA1C 6 0 02/03/2023     - Current medications:  Metformin 1000 mg bid, Jardiance 25 mg daily  - Home glucose readings: fasting between 65-90, and post prandial 160-200  - Diet: eating 2 meals a day-breakfast and lunch  - +Htn: no Htn, not on an ACE  - +Hld: not taking Atorvastatin 40 mg, Lpid Panel recheck  - Urine microalb: 7/2022- normal  - Ophthalmology: 2022  - DM Foot exam: next visit  - Dentist: regular  - Pneumo vaccine: done today  - Smoker: no  - Keep log of blood glucose readings  - Encouraged: Diabetic Diet, Regular exercise, Weight loss

## 2023-02-03 NOTE — PROGRESS NOTES
Name: Davon Borges      : 1960      MRN: [id number]  Encounter Provider: Barbara Brewster MD  Encounter Date: 2/3/2023   Encounter department: [address] [place] [address]     1  Type 2 diabetes mellitus without complication, without long-term current use of insulin ([facility]ly [facility])  Assessment & Plan:    Lab Results   Component Value Date    HGBA1C 6 0 2023     - Current medications:  Metformin 1000 mg bid, Jardiance 25 mg daily  - Home glucose readings: fasting between 65-90, and post prandial 160-200  - Diet: eating 2 meals a day-breakfast and lunch  - +Htn: no Htn, not on an ACE  - +Hld: not taking Atorvastatin 40 mg, Lpid Panel recheck  - Urine microalb: 2022- normal  - Ophthalmology:   - DM Foot exam: next visit  - Dentist: regular  - Pneumo vaccine: done today  - Smoker: no  - Keep log of blood glucose readings  - Encouraged: Diabetic Diet, Regular exercise, Weight loss    Orders:  -     POCT hemoglobin A1c  -     HEMOGLOBIN A1C W/ EAG ESTIMATION; Future  -     Lipid Panel with Direct LDL reflex; Future  -     Comprehensive metabolic panel; Future  -     Microalbumin / creatinine urine ratio  -     Empagliflozin (Jardiance) 25 MG TABS; Take 1 tablet (25 mg total) by mouth daily  -     metFORMIN (GLUCOPHAGE) 1000 MG tablet; Take 1 tablet (1,000 mg total) by mouth 2 (two) times a day  -     multivitamin-minerals (CENTRUM ADULTS) tablet; Take 1 tablet by mouth daily SUPPLEMENT  -     Multiple Vitamins-Minerals (Ocuvite-Lutein) CAPS; Take 1 capsule by mouth daily    2  Chronic left shoulder pain  Assessment & Plan:    Improved with Physical Therapy  Will refer to Sports medicine for possible steroid injection  Refilled the Naproxen and Flexeril  May also use Voltaren gel  Ice, Heat, Massage, Stretches    Orders:  -     Ambulatory Referral to Sports Medicine; Future  -     cyclobenzaprine (FLEXERIL) 10 mg tablet;  Take 1 tablet (10 mg total) by mouth 3 (three) times a day as needed for muscle spasms  -     naproxen (Naprosyn) 500 mg tablet; Take 1 tablet (500 mg total) by mouth 2 (two) times a day with meals    3  Seasonal allergies  -     loratadine (CLARITIN) 10 mg tablet; Take 1 tablet (10 mg total) by mouth daily    4  Erectile dysfunction, unspecified erectile dysfunction type  -     sildenafil (VIAGRA) 25 MG tablet; Take 1 tablet (25 mg total) by mouth daily as needed for erectile dysfunction    5  Encounter for immunization  -     Pneumococcal Conjugate Vaccine 20-valent (Pcv20)         Mariluz Mills is a 58 y o  male  has a past medical history of Diabetes mellitus (HCC)who presented to the office today  For follow up of chrinic conditions  The patient states is eating two meals a day breakfast and lunch  His fasting blood sugars are well conttrolled, but does has post-prandial spikes  He walk after meals as well  He has completed Physical Therapy for his left shoulder with imrpovement, but does still have pain at night  This wakes him up and then he needs to change his position  C/o of weak urinary stream, and also weak erection  He would ilke to try Viagra to help with erections      Review of Systems   Constitutional: Negative for chills and fever  HENT: Negative for congestion, rhinorrhea and sore throat  Respiratory: Negative for cough and shortness of breath  Cardiovascular: Negative for chest pain  Gastrointestinal: Negative for diarrhea, nausea and vomiting  Skin: Negative for rash  Allergic/Immunologic: Positive for environmental allergies  Neurological: Negative for dizziness and headaches         Current Outpatient Medications:   •  cyclobenzaprine (FLEXERIL) 10 mg tablet, Take 1 tablet (10 mg total) by mouth 3 (three) times a day as needed for muscle spasms, Disp: 30 tablet, Rfl: 1  •  Empagliflozin (Jardiance) 25 MG TABS, Take 1 tablet (25 mg total) by mouth daily, Disp: 90 tablet, Rfl: 1  •  loratadine (CLARITIN) 10 mg tablet, Take 1 tablet (10 mg total) by mouth daily, Disp: 90 tablet, Rfl: 1  •  metFORMIN (GLUCOPHAGE) 1000 MG tablet, Take 1 tablet (1,000 mg total) by mouth 2 (two) times a day, Disp: 180 tablet, Rfl: 5  •  Multiple Vitamins-Minerals (Ocuvite-Lutein) CAPS, Take 1 capsule by mouth daily, Disp: 90 capsule, Rfl: 1  •  multivitamin-minerals (CENTRUM ADULTS) tablet, Take 1 tablet by mouth daily SUPPLEMENT, Disp: 90 tablet, Rfl: 1  •  naproxen (Naprosyn) 500 mg tablet, Take 1 tablet (500 mg total) by mouth 2 (two) times a day with meals, Disp: 60 tablet, Rfl: 1  •  sildenafil (VIAGRA) 25 MG tablet, Take 1 tablet (25 mg total) by mouth daily as needed for erectile dysfunction, Disp: 10 tablet, Rfl: 0  •  atorvastatin (LIPITOR) 40 mg tablet, Take 1 tablet (40 mg total) by mouth daily (Patient not taking: Reported on 11/30/2022), Disp: 90 tablet, Rfl: 1  •  Blood Glucose Monitoring Suppl (OneTouch Verio) w/Device KIT, Use 2 (two) times a day, Disp: 1 kit, Rfl: 0  •  Diclofenac Sodium (VOLTAREN) 1 %, Apply 2 g topically 4 (four) times a day, Disp: 150 g, Rfl: 1  •  glucose blood (OneTouch Verio) test strip, Use as instructed, Disp: 100 strip, Rfl: 2  •  Lancets (onetouch ultrasoft) lancets, Use as instructed, Disp: 100 each, Rfl: 2  •  tamsulosin (FLOMAX) 0 4 mg, Take 1 capsule (0 4 mg total) by mouth daily with dinner, Disp: 30 capsule, Rfl: 11  •  triamcinolone (KENALOG) 0 1 % cream, Apply topically 2 (two) times a day, Disp: 30 g, Rfl: 0        Objective     /80 (BP Location: Right arm, Patient Position: Sitting, Cuff Size: Standard)   Pulse 96   Temp 98 7 °F (37 1 °C) (Temporal)   Resp 16   Ht 5' 4" (1 626 m)   Wt 86 6 kg (191 lb)   SpO2 99%   BMI 32 79 kg/m²     Physical Exam  Vitals and nursing note reviewed  Constitutional:       Appearance: He is well-developed  HENT:      Head: Normocephalic and atraumatic        Right Ear: External ear normal       Left Ear: External ear normal    Cardiovascular: Rate and Rhythm: Normal rate and regular rhythm  Heart sounds: Normal heart sounds  No murmur heard  Pulmonary:      Effort: Pulmonary effort is normal  No respiratory distress  Breath sounds: Normal breath sounds  Abdominal:      General: Bowel sounds are normal  There is no distension  Palpations: Abdomen is soft  Musculoskeletal:      Right lower leg: No edema  Left lower leg: No edema  Neurological:      Mental Status: He is alert and oriented to person, place, and time     Psychiatric:         Mood and Affect: Mood normal        Joe Frazier MD

## 2023-02-04 LAB
EST. AVERAGE GLUCOSE BLD GHB EST-MCNC: 123 MG/DL
HBA1C MFR BLD: 5.9 %

## 2023-02-06 ENCOUNTER — TELEPHONE (OUTPATIENT)
Dept: UROLOGY | Facility: AMBULATORY SURGERY CENTER | Age: 63
End: 2023-02-06

## 2023-02-06 ENCOUNTER — PATIENT OUTREACH (OUTPATIENT)
Dept: FAMILY MEDICINE CLINIC | Facility: CLINIC | Age: 63
End: 2023-02-06

## 2023-02-06 DIAGNOSIS — E78.2 MIXED HYPERLIPIDEMIA: Primary | ICD-10-CM

## 2023-02-06 RX ORDER — ATORVASTATIN CALCIUM 20 MG/1
20 TABLET, FILM COATED ORAL DAILY
Qty: 90 TABLET | Refills: 1 | Status: SHIPPED | OUTPATIENT
Start: 2023-02-06

## 2023-02-06 NOTE — TELEPHONE ENCOUNTER
Called patient -  for son Alexis Fowler -  He is given the results of his PSA  We will see him in one year  He is asked to call if there are any further questions or concerns  Clerical - please make sure patient gets a postcard for 02/24

## 2023-02-06 NOTE — PROGRESS NOTES
Chart Review  02/06/2023    [facility] had called multiple of times Shonna Antony and left voicemail to call back  Hoa Gomez had not return calls  [facility] will mail out today contact letter  Next follow up was scheduled on 02/20/2023

## 2023-02-06 NOTE — RESULT ENCOUNTER NOTE
----- Message from Kira Bansal sent at 8/19/2020  2:59 PM CDT -----  Type:  Patient Returning Call    Who Called:pt  Who Left Message for Patient:nurse  Does the patient know what this is regarding?:he is not sure, maybe blood work   Would the patient rather a call back or a response via [employer]? Call back  Best Call Back Number:[phone]  Additional Information: .    Thank you         Called patient - LMOM for son Estelle Robles  He is given the results of his PSA  We will send him a reminder card for next year  He is asked to call if he has any further questions or concerns

## 2023-02-06 NOTE — TELEPHONE ENCOUNTER
----- Message from Aren Fernandez PA-C sent at 2/6/2023  9:07 AM EST -----  Can contact son in chart as patient speaks Mandarin  PSA trended back down from 2 1 to 1 1  No further work up indicated at this time  Would continue yearly PSA screening, next due in Feb 2023  Patient can continue follow up with our office or he can have annual follow up with his PCP and return to us if there are any other issues  Whatever is easier for him and his family  Thanks!

## 2023-02-13 ENCOUNTER — TELEPHONE (OUTPATIENT)
Dept: OBGYN CLINIC | Facility: CLINIC | Age: 63
End: 2023-02-13

## 2023-02-13 ENCOUNTER — OFFICE VISIT (OUTPATIENT)
Dept: OBGYN CLINIC | Facility: CLINIC | Age: 63
End: 2023-02-13

## 2023-02-13 VITALS
HEIGHT: 64 IN | BODY MASS INDEX: 32.44 KG/M2 | HEART RATE: 80 BPM | SYSTOLIC BLOOD PRESSURE: 120 MMHG | DIASTOLIC BLOOD PRESSURE: 82 MMHG | WEIGHT: 190 LBS

## 2023-02-13 DIAGNOSIS — M25.512 CHRONIC LEFT SHOULDER PAIN: ICD-10-CM

## 2023-02-13 DIAGNOSIS — M75.22 BICEPS TENDONITIS ON LEFT: Primary | ICD-10-CM

## 2023-02-13 DIAGNOSIS — G89.29 CHRONIC LEFT SHOULDER PAIN: ICD-10-CM

## 2023-02-13 NOTE — PROGRESS NOTES
ASSESSMENT/PLAN:      Diagnoses and all orders for this visit:    Biceps tendonitis on left      58year old male with left biceps tendonitis  We discussed that while some of his other testing today was positive, they all seem to reproduce pain in the proximal biceps  Since pt has already tried medications and therapy without full relief, I do suggest he see one of my colleagues for an u/s guided steroid injection of the biceps tendon  He was urged to continue his therapy, even after the injx is provided  We will get him set up for this  The patient verbalized understanding of exam findings and treatment plan  We engaged in the shared decision-making process and treatment options were discussed at length with the patient  Surgical and conservative management discussed today along with risks and benefits  Follow Up: With sports med      ____________________________________________________________________________________________________________________________________________      CHIEF COMPLAINT:  Chief Complaint   Patient presents with   • Left Shoulder - Pain       SUBJECTIVE:  Meena Pineda is a 58y o  year old male who presents today for evaluation and treatment of left shoulder pain  Patient presents with his son today for translation  Patient has been having pain here for approximately 1 year  Pt does not recall a specific trauma that started this  He did go to therapy and states this did help, but he still has symptoms, especially at night and with certain motions  Describes his pain as primarily being along the anterior shoulder and can radiate slightly down the arm  I have personally reviewed all the relevant PMH, PSH, SH, FH, Medications and allergies      PAST MEDICAL HISTORY:  Past Medical History:   Diagnosis Date   • Diabetes mellitus ([facility] [address] )        PAST SURGICAL HISTORY:  History reviewed  No pertinent surgical history  FAMILY HISTORY:  History reviewed   No pertinent family history      SOCIAL HISTORY:  Social History     Tobacco Use   • Smoking status: Former   • Smokeless tobacco: Never   Vaping Use   • Vaping Use: Never used   Substance Use Topics   • Alcohol use: No   • Drug use: No       MEDICATIONS:    Current Outpatient Medications:   •  atorvastatin (LIPITOR) 20 mg tablet, Take 1 tablet (20 mg total) by mouth daily, Disp: 90 tablet, Rfl: 1  •  Blood Glucose Monitoring Suppl (OneTouch Verio) w/Device KIT, Use 2 (two) times a day, Disp: 1 kit, Rfl: 0  •  cyclobenzaprine (FLEXERIL) 10 mg tablet, Take 1 tablet (10 mg total) by mouth 3 (three) times a day as needed for muscle spasms, Disp: 30 tablet, Rfl: 1  •  Diclofenac Sodium (VOLTAREN) 1 %, Apply 2 g topically 4 (four) times a day, Disp: 150 g, Rfl: 1  •  Empagliflozin (Jardiance) 25 MG TABS, Take 1 tablet (25 mg total) by mouth daily, Disp: 90 tablet, Rfl: 1  •  glucose blood (OneTouch Verio) test strip, Use as instructed, Disp: 100 strip, Rfl: 2  •  Lancets (onetouch ultrasoft) lancets, Use as instructed, Disp: 100 each, Rfl: 2  •  loratadine (CLARITIN) 10 mg tablet, Take 1 tablet (10 mg total) by mouth daily, Disp: 90 tablet, Rfl: 1  •  metFORMIN (GLUCOPHAGE) 1000 MG tablet, Take 1 tablet (1,000 mg total) by mouth 2 (two) times a day, Disp: 180 tablet, Rfl: 5  •  Multiple Vitamins-Minerals (Ocuvite-Lutein) CAPS, Take 1 capsule by mouth daily, Disp: 90 capsule, Rfl: 1  •  multivitamin-minerals (CENTRUM ADULTS) tablet, Take 1 tablet by mouth daily SUPPLEMENT, Disp: 90 tablet, Rfl: 1  •  naproxen (Naprosyn) 500 mg tablet, Take 1 tablet (500 mg total) by mouth 2 (two) times a day with meals, Disp: 60 tablet, Rfl: 1  •  sildenafil (VIAGRA) 25 MG tablet, Take 1 tablet (25 mg total) by mouth daily as needed for erectile dysfunction, Disp: 10 tablet, Rfl: 0  •  tamsulosin (FLOMAX) 0 4 mg, Take 1 capsule (0 4 mg total) by mouth daily with dinner, Disp: 30 capsule, Rfl: 11  •  triamcinolone (KENALOG) 0 1 % cream, Apply topically 2 (two) times a day, Disp: 30 g, Rfl: 0    ALLERGIES:  No Known Allergies        REVIEW OF SYSTEMS:  Musculoskeletal:        As noted in HPI  All other systems reviewed and are negative  VITALS:  Vitals:    02/13/23 1352   BP: 120/82   Pulse: 80       LABS:  HgA1c:   Lab Results   Component Value Date    HGBA1C 5 9 (H) 02/03/2023     BMP:   Lab Results   Component Value Date    GLUCOSE 223 (H) 01/14/2014    CALCIUM 9 2 02/03/2023     01/14/2014    K 3 9 02/03/2023    CO2 26 02/03/2023     02/03/2023    BUN 13 02/03/2023    CREATININE 0 96 02/03/2023       _____________________________________________________  PHYSICAL EXAMINATION:  General: well developed and well nourished, alert, oriented times 3 and appears comfortable  Psychiatric: Normal  HEENT: Normocephalic, Atraumatic Trachea Midline, No torticollis  Pulmonary: No audible wheezing or respiratory distress   Abdomen/GI: Non tender, non distended   Cardiovascular: No pitting edema, 2+ radial pulse   Skin: No masses, erythema, lacerations, fluctation, ulcerations  Neurovascular: s/m grossly intact  Musculoskeletal: Normal, except as noted in detailed exam and in HPI  MUSCULOSKELETAL EXAMINATION:  Left Shoulder:  Pt with ttp along the proximal biceps tendon  Nontender along posterior shoulder or subacromial space  Pt has well-maintained motion comparative to the contralateral side, but describes anterior discomfort with further degrees of motion  + empty can (anterior pain)  + yergason's  + pain with resisted supination      ___________________________________________________  STUDIES REVIEWED:  Xrays of the left shoulder were reviewed in PACS by myself and demonstrate some mild AC degenerative changes  No acute osseous abnormalities           PROCEDURES PERFORMED:  Procedures  No Procedures performed today    _____________________________________________________

## 2023-02-13 NOTE — TELEPHONE ENCOUNTER
Called patient to schedule USGI bicep with Dr Zane Rodríguez for either tomorroe at 3pm or Tuesday 2/21 at 3pm  [facility] to call back directly to [facility] office

## 2023-02-21 ENCOUNTER — PATIENT OUTREACH (OUTPATIENT)
Dept: FAMILY MEDICINE CLINIC | Facility: CLINIC | Age: 63
End: 2023-02-21

## 2023-02-21 ENCOUNTER — OFFICE VISIT (OUTPATIENT)
Dept: OBGYN CLINIC | Facility: MEDICAL CENTER | Age: 63
End: 2023-02-21

## 2023-02-21 VITALS
SYSTOLIC BLOOD PRESSURE: 120 MMHG | HEIGHT: 64 IN | BODY MASS INDEX: 32.61 KG/M2 | DIASTOLIC BLOOD PRESSURE: 82 MMHG | HEART RATE: 82 BPM | WEIGHT: 191 LBS

## 2023-02-21 DIAGNOSIS — M25.512 CHRONIC LEFT SHOULDER PAIN: ICD-10-CM

## 2023-02-21 DIAGNOSIS — G89.29 CHRONIC LEFT SHOULDER PAIN: ICD-10-CM

## 2023-02-21 DIAGNOSIS — M75.22 BICEPS TENDONITIS ON LEFT: Primary | ICD-10-CM

## 2023-02-21 NOTE — PROGRESS NOTES
Chart Review  02/21/2023    Per Chart review, [facility] had called multiple of times Shree Baron and left voicemail  Also contact letter was sent on 02/06/2023  No responses had been received from Sunday  This referral will be closed on my behalf today 02/21/2023 due unable to contact patient  No follow up was scheduled at this time

## 2023-02-22 RX ORDER — BETAMETHASONE SODIUM PHOSPHATE AND BETAMETHASONE ACETATE 3; 3 MG/ML; MG/ML
3 INJECTION, SUSPENSION INTRA-ARTICULAR; INTRALESIONAL; INTRAMUSCULAR; SOFT TISSUE
Status: COMPLETED | OUTPATIENT
Start: 2023-02-22 | End: 2023-02-22

## 2023-02-22 RX ADMIN — BETAMETHASONE SODIUM PHOSPHATE AND BETAMETHASONE ACETATE 3 MG: 3; 3 INJECTION, SUSPENSION INTRA-ARTICULAR; INTRALESIONAL; INTRAMUSCULAR; SOFT TISSUE at 07:02

## 2023-02-22 NOTE — PATIENT INSTRUCTIONS

## 2023-02-22 NOTE — PROGRESS NOTES
1  Biceps tendonitis on left        2  Chronic left shoulder pain          Orders Placed This Encounter   Procedures   • Hand/upper extremity injection        Imaging Studies (I personally reviewed images in PACS and report):    • X-ray left shoulder 11/15/2022: No acute osseous abnormalities  Mild AC joint arthritis  IMPRESSION:  • Chronic left shoulder pain - mainly anterior aspect  • Ongoing since fall on 2022 - reportedly after lifting while moving  • XR unremarkable other than mild AC OA (non-tender over this aspect of OA)  • No relief from formal PT >6 weeks  • DDx: biceps tendinosis/RC impingement    PLAN:    • Clinical exam and radiographic imaging reviewed with patient today, with impression as per above  I have discussed with the patient the pathophysiology of this diagnosis and reviewed how the examination correlates with this diagnosis  • Patient was referred to me today in regards to an ultrasound-guided left bicipital tendon sheath cortisone injection  After discussing risk/benefits of procedure, patient was agreeable and this was done as per procedure note below  • Recommended patient not lift anything heavier than 1 pound with his left upper extremity for at least 72 hours to minimize risk for bicipital tendon rupture  After 72 hours, he can return to lifting as tolerated with left upper extremity  • In regards to follow-up, patient and his son states that they have a follow-up in the next week or 2 with orthopedics  Portions of the record may have been created with voice recognition software  Occasional wrong word or "sound a like" substitutions may have occurred due to the inherent limitations of voice recognition software  Read the chart carefully and recognize, using context, where substitutions have occurred       Mandarin interpretation through his son was utilized for entirety of office visit including history, physical exam, results review, and patient instructions/treatment plan       CHIEF COMPLAINT:  Chief Complaint   Patient presents with   • Left Shoulder - Pain         HPI:  Cady Lara is a 58 y o  male  who presents in regards to internal referral from [place], [place], for       Visit 2/21/2023:  Initial evaluation of left shoulder pain:  Internally for from orthopedics in regards to providing an ultrasound-guided left bicipital tendon sheath injection  Patient reports that this is been an ongoing issue since the beginning of fall 2022  He states that there is no specific precipitating injury other than he was helping moving heavy boxes out and thinks he may have overused his arm  He states since this incident he has had consistent left anterior shoulder pain that describes as sharp/stabbing  He states can radiate to the lateral aspect as well as down his upper arm  Denies prior surgeries of his left shoulder in the past   Pain will be aggravated with any range of motion movements of his arm especially reaching outwards and behind his back  Denies any N/T of his left upper extremity  He had seen physical therapy for this issue which she started on 12/5/2022 and had been ongoing for at least 6 weeks without significant relief  He has imaging done as noted above  Reports no relief with NSAIDs, acetaminophen, heat/ice therapy  He has never had an injection of her shoulder previously  Medical, Surgical, Family, and Social History    Past Medical History:   Diagnosis Date   • Diabetes mellitus ([facility] [address] )      History reviewed  No pertinent surgical history  Social History   Social History     Substance and Sexual Activity   Alcohol Use No     Social History     Substance and Sexual Activity   Drug Use No     Social History     Tobacco Use   Smoking Status Former   Smokeless Tobacco Never     History reviewed  No pertinent family history    No Known Allergies       Physical Exam  /82   Pulse 82   Ht 5' 4" (1 626 m)   Wt 86 6 kg (191 lb)   BMI 32 79 kg/m² Constitutional:  see vital signs  Gen: well-developed, normocephalic/atraumatic, well-groomed  Pulmonary/Chest: Effort normal  No respiratory distress  Ortho Exam  Shoulder exam:       LEFT    Inspection Erythema None     Swelling None     Increased Warmth None    Rotator cuff ER 5/5     IR 5/5     Abduction 5/5    ROM      Abduction 150     ER0 60     IRb Lower lumbar    TTP:  +anterior aspect of shoulder over bicipital groove/anterior glenohumeral joint line    Special Tests:       Cross body Adduction Negative     Speeds  Positive     Yergason's Positive     Drop arm Negative     Neer Negative     Hernandez Positive                Hand/upper extremity injection  Universal Protocol:  Consent: Verbal consent not obtained  Risks and benefits: risks, benefits and alternatives were discussed  Consent given by: patient  Patient understanding: patient states understanding of the procedure being performed  Site marked: the operative site was marked  Radiology Images displayed and confirmed   If images not available, report reviewed: imaging studies available  Required items: required blood products, implants, devices, and special equipment available  Patient identity confirmed: verbally with patient    Supporting Documentation  Indications: joint swelling, pain, diagnostic and therapeutic   Procedure Details  Condition:tendonitis Condition comment: Left sided bicipital tendinosis   Preparation: Patient was prepped and draped in the usual sterile fashion  Needle size: 25 G  Ultrasound guidance: yes  Approach: dorsal  Medications administered: 3 mg betamethasone acetate-betamethasone sodium phosphate 6 (3-3) mg/mL  Analysis: fluid sample sent for laboratory analysis  Patient tolerance: patient tolerated the procedure well with no immediate complications    LEFT Ultrasound-guided long head of the biceps tendon injection performed using anesthetic and corticosteroid with 25 gauge needle advanced to target site in line with linear array probe short axis to biceps tendon in bicipital groove  Anterior circumflex artery visualized and avoided during injection  Injectate visualized filling tendinous sheath  Patient tolerated well and had significant improvement of symptoms on repeat palpation after procedure performed  Sterile bandage placed  Minimal to no bleeding

## 2023-02-28 ENCOUNTER — OFFICE VISIT (OUTPATIENT)
Dept: DENTISTRY | Facility: CLINIC | Age: 63
End: 2023-02-28

## 2023-02-28 VITALS — TEMPERATURE: 97.6 F | DIASTOLIC BLOOD PRESSURE: 69 MMHG | HEART RATE: 106 BPM | SYSTOLIC BLOOD PRESSURE: 106 MMHG

## 2023-02-28 DIAGNOSIS — K08.89 NON-RESTORABLE TOOTH: Primary | ICD-10-CM

## 2023-02-28 NOTE — PROGRESS NOTES
Oral Surgery    Chrystal Mccauley presents for Ext #18,20,22,27,28,29, 27    [address], patient denies any changes  Obtained a direct and personal consent  Risks and complications were explained  Pt agreed and consented  Consent scanned in doc center  Pre-Op BP WNL  Administered 2 5 carps 2 % Lidocaine w/ 1:100,000 epi via IANB (2 carps) and infiltration (0 5 carps), and 3 carps 4% septocaine 1:100 000 epi (2 carps via IANB and 1 carp via infiltration)   ? Adequate anesthesia obtained, Sectioned bridge on the M and D of tooth #22  Reflected gingiva, elevated, and extracted #18,20,22,27,28,29, 30  Socket irrigated  Sharp bone was palpated around #22  Two vertical releasing flaps were raised around #22 and sharp bone removed with rongeurs and bone file  and  and 4 0 chromic gut sutures placed around #22  Discussed that we will take prelim impressions in 6-8 weeks  Discussed again that 6 months of healing are required for conventional dentures   We can begin dentures in 6-8 weeks, however, final dentures should not be fabricated until 6 months of healing have passed and an updated PVS wash should be done at that point to ensure the most accurate fit of the dentures     Pre-auth expires soon and was resubmitted today     Upon dismissal, patient received POI, ice, gauze    NV: prelim impressions in 6-8 weeks

## 2023-03-06 ENCOUNTER — OFFICE VISIT (OUTPATIENT)
Dept: FAMILY MEDICINE CLINIC | Facility: CLINIC | Age: 63
End: 2023-03-06

## 2023-03-06 VITALS
RESPIRATION RATE: 14 BRPM | OXYGEN SATURATION: 97 % | HEART RATE: 96 BPM | HEIGHT: 64 IN | TEMPERATURE: 97.3 F | SYSTOLIC BLOOD PRESSURE: 118 MMHG | WEIGHT: 192.6 LBS | DIASTOLIC BLOOD PRESSURE: 72 MMHG | BODY MASS INDEX: 32.88 KG/M2

## 2023-03-06 DIAGNOSIS — L60.0 INGROWN NAIL OF GREAT TOE OF RIGHT FOOT: ICD-10-CM

## 2023-03-06 DIAGNOSIS — S91.101A OPEN WOUND OF RIGHT GREAT TOE, INITIAL ENCOUNTER: Primary | ICD-10-CM

## 2023-03-09 NOTE — PROGRESS NOTES
Name: Larry Snyder      : 1960      MRN: [id number]  Encounter Provider: German Cheng MD  Encounter Date: 3/6/2023   Encounter department: [address] [place] [address]     1  Open wound of right great toe, initial encounter  Assessment & Plan:  Apply Bacitracin ointment bid  Refer Podiatry for evaluation of possible ingrown toenail      Orders:  -     Ambulatory Referral to Podiatry; Future    2  Ingrown nail of great toe of right foot  -     Ambulatory Referral to Podiatry; Future         Subjective      Larry Snydre is a 58 y o  male  who presented to the office today with c/o of right great toe possible ingrown nail  He tried to cut his nail and trimmed it very short  It is also dry around the area and has a linear open wound  The following portions of the patient's history were reviewed and updated as appropriate: allergies, current medications, past family history, past medical history, past social history, past surgical history and problem list       Review of Systems   Constitutional: Negative for chills and fever  HENT: Negative for congestion, rhinorrhea and sore throat  Respiratory: Negative for cough and shortness of breath  Cardiovascular: Negative for chest pain  Gastrointestinal: Negative for diarrhea, nausea and vomiting  Skin: Negative for rash  Allergic/Immunologic: Positive for environmental allergies  Neurological: Negative for dizziness and headaches         Current Outpatient Medications on File Prior to Visit   Medication Sig   • atorvastatin (LIPITOR) 20 mg tablet Take 1 tablet (20 mg total) by mouth daily   • Blood Glucose Monitoring Suppl (OneTouch Verio) w/Device KIT Use 2 (two) times a day   • cyclobenzaprine (FLEXERIL) 10 mg tablet Take 1 tablet (10 mg total) by mouth 3 (three) times a day as needed for muscle spasms   • Diclofenac Sodium (VOLTAREN) 1 % Apply 2 g topically 4 (four) times a day   • Empagliflozin (Jardiance) 25 MG TABS Take 1 tablet (25 mg total) by mouth daily   • glucose blood (OneTouch Verio) test strip Use as instructed   • Lancets (onetouch ultrasoft) lancets Use as instructed   • loratadine (CLARITIN) 10 mg tablet Take 1 tablet (10 mg total) by mouth daily   • metFORMIN (GLUCOPHAGE) 1000 MG tablet Take 1 tablet (1,000 mg total) by mouth 2 (two) times a day   • Multiple Vitamins-Minerals (Ocuvite-Lutein) CAPS Take 1 capsule by mouth daily   • multivitamin-minerals (CENTRUM ADULTS) tablet Take 1 tablet by mouth daily SUPPLEMENT   • naproxen (Naprosyn) 500 mg tablet Take 1 tablet (500 mg total) by mouth 2 (two) times a day with meals   • sildenafil (VIAGRA) 25 MG tablet Take 1 tablet (25 mg total) by mouth daily as needed for erectile dysfunction   • tamsulosin (FLOMAX) 0 4 mg Take 1 capsule (0 4 mg total) by mouth daily with dinner   • triamcinolone (KENALOG) 0 1 % cream Apply topically 2 (two) times a day   • [DISCONTINUED] nystatin (MYCOSTATIN) cream Apply topically 2 (two) times a day       Objective     /72 (BP Location: Right arm, Patient Position: Sitting, Cuff Size: Adult)   Pulse 96   Temp (!) 97 3 °F (36 3 °C) (Temporal)   Resp 14   Ht 5' 4" (1 626 m)   Wt 87 4 kg (192 lb 9 6 oz)   SpO2 97%   BMI 33 06 kg/m²     Physical Exam  Vitals and nursing note reviewed  Constitutional:       Appearance: He is well-developed  HENT:      Head: Normocephalic and atraumatic  Right Ear: External ear normal       Left Ear: External ear normal    Cardiovascular:      Rate and Rhythm: Normal rate and regular rhythm  Heart sounds: Normal heart sounds  No murmur heard  Pulmonary:      Effort: Pulmonary effort is normal  No respiratory distress  Breath sounds: Normal breath sounds  Abdominal:      General: Bowel sounds are normal  There is no distension  Palpations: Abdomen is soft  Musculoskeletal:      Right lower leg: No edema  Left lower leg: No edema     Feet:      Comments: Right great toenail trimmed short, 1 cm linear open wound/fissure  Neurological:      Mental Status: He is alert and oriented to person, place, and time     Psychiatric:         Mood and Affect: Mood normal        Shaina Flores MD

## 2023-03-10 PROBLEM — S91.101A OPEN WOUND OF RIGHT GREAT TOE: Status: ACTIVE | Noted: 2023-03-10

## 2023-04-27 ENCOUNTER — PATIENT OUTREACH (OUTPATIENT)
Dept: FAMILY MEDICINE CLINIC | Facility: CLINIC | Age: 63
End: 2023-04-27

## 2023-04-27 NOTE — PROGRESS NOTES
CARLIE GUERRERO did place a second call to Pt but he did not  the phone  CARLIE GUERRERO will send Unable to Reach Letter, and will close the case  CARLIE GUERRERO will close this referral today due to unable to contact Pt  CARLIE GUERRERO is remain available for further assistance as needed

## 2023-04-27 NOTE — LETTER
[address] [facility], [facility] [address]  [address]  [phone]    Re: Care coordination   4/27/2023       Dear Kaylen Whipple,    We tried to reach you by phone on 4/11/23 and 4/27/23 and was unfortunately unable to reach you  It is important that you contact the [facility]  as soon as possible at: [phone]      Sincerely,         [address]

## 2023-04-28 ENCOUNTER — OFFICE VISIT (OUTPATIENT)
Dept: DENTISTRY | Facility: CLINIC | Age: 63
End: 2023-04-28

## 2023-04-28 VITALS — DIASTOLIC BLOOD PRESSURE: 76 MMHG | TEMPERATURE: 96.2 F | SYSTOLIC BLOOD PRESSURE: 123 MMHG | HEART RATE: 99 BPM

## 2023-04-28 DIAGNOSIS — K08.109 COMPLETE EDENTULISM, UNSPECIFIED EDENTULISM CLASS: Primary | ICD-10-CM

## 2023-04-28 NOTE — PROGRESS NOTES
"Encounter for Denture Impressions (No Treatment Performed)    Owen Thakur is a 65yo [facility] male who presented to [facility] with son who acted as  for pt who speaks Mandarin  PMHR, significant for:  · DM II (Today's AM POC glucose = 106mg/dL)  · Chronic kidney dz Stg 2 (when questioning the son with how this was going son had no idea about this dg)  · RX: atorvastatin, flexeril, Lipitor, Viagra, Glucophage    ASA 2/3 (chronic kidney dz, Stg 2)  Pain = 0/10  BP = 123/76mmHg    Pt presented for prelim impressions for C/C dentures following OMFS ext  Pt reports having had remaining teeth ext \"February 28th\"  Upon intraoral inspection pt had several areas where bone felt sharp when palpated and pt had discomfort upon pressure  Wrote OS referral for alveoloplasty for the following areas:  · UR-  #6 site  · U anterior - #11 site  · UL - #12, 16 site  · LL - #22 site  · LR - #27-29 site    Explained to pt through son that it is best to smooth these areas out so that we can get accurate impressions and have a comfortable fit  Pt understood and consented to plan      Referral: [facility]    NV:  Pt will call to make appt to begin denture fabrication 6-8 wks AFTER alveoloplasty/pre-prosth sx completed    NNV: annual recall exam  "

## 2023-05-05 ENCOUNTER — OFFICE VISIT (OUTPATIENT)
Dept: FAMILY MEDICINE CLINIC | Facility: CLINIC | Age: 63
End: 2023-05-05

## 2023-05-05 VITALS
DIASTOLIC BLOOD PRESSURE: 74 MMHG | BODY MASS INDEX: 32.95 KG/M2 | WEIGHT: 193 LBS | RESPIRATION RATE: 18 BRPM | HEIGHT: 64 IN | OXYGEN SATURATION: 97 % | TEMPERATURE: 98.2 F | HEART RATE: 85 BPM | SYSTOLIC BLOOD PRESSURE: 122 MMHG

## 2023-05-05 DIAGNOSIS — E78.2 MIXED HYPERLIPIDEMIA: ICD-10-CM

## 2023-05-05 DIAGNOSIS — N40.1 BENIGN PROSTATIC HYPERPLASIA WITH NOCTURIA: ICD-10-CM

## 2023-05-05 DIAGNOSIS — E66.09 CLASS 1 OBESITY DUE TO EXCESS CALORIES WITH SERIOUS COMORBIDITY AND BODY MASS INDEX (BMI) OF 33.0 TO 33.9 IN ADULT: ICD-10-CM

## 2023-05-05 DIAGNOSIS — G89.29 CHRONIC LEFT SHOULDER PAIN: ICD-10-CM

## 2023-05-05 DIAGNOSIS — M25.512 CHRONIC LEFT SHOULDER PAIN: ICD-10-CM

## 2023-05-05 DIAGNOSIS — E11.9 TYPE 2 DIABETES MELLITUS WITHOUT COMPLICATION, WITHOUT LONG-TERM CURRENT USE OF INSULIN (HCC): Primary | ICD-10-CM

## 2023-05-05 DIAGNOSIS — J30.2 SEASONAL ALLERGIES: ICD-10-CM

## 2023-05-05 DIAGNOSIS — R21 RASH: ICD-10-CM

## 2023-05-05 DIAGNOSIS — R35.1 BENIGN PROSTATIC HYPERPLASIA WITH NOCTURIA: ICD-10-CM

## 2023-05-05 LAB — SL AMB POCT HEMOGLOBIN AIC: 6.9 (ref ?–6.5)

## 2023-05-05 RX ORDER — ATORVASTATIN CALCIUM 40 MG/1
40 TABLET, FILM COATED ORAL DAILY
Qty: 90 TABLET | Refills: 1 | Status: SHIPPED | OUTPATIENT
Start: 2023-05-05

## 2023-05-05 RX ORDER — TRIAMCINOLONE ACETONIDE 1 MG/G
CREAM TOPICAL 2 TIMES DAILY
Qty: 30 G | Refills: 0 | Status: SHIPPED | OUTPATIENT
Start: 2023-05-05

## 2023-05-05 RX ORDER — LORATADINE 10 MG/1
10 TABLET ORAL DAILY
Qty: 90 TABLET | Refills: 1 | Status: SHIPPED | OUTPATIENT
Start: 2023-05-05

## 2023-05-05 RX ORDER — MULTIVITAMIN/IRON/FOLIC ACID 18MG-0.4MG
1 TABLET ORAL DAILY
Qty: 90 TABLET | Refills: 1 | Status: SHIPPED | OUTPATIENT
Start: 2023-05-05

## 2023-05-05 RX ORDER — TAMSULOSIN HYDROCHLORIDE 0.4 MG/1
0.4 CAPSULE ORAL
Qty: 30 CAPSULE | Refills: 11 | Status: SHIPPED | OUTPATIENT
Start: 2023-05-05

## 2023-05-05 RX ORDER — VIT C/E/ZN/COPPR/LUTEIN/ZEAXAN 60 MG-6 MG
1 CAPSULE ORAL DAILY
Qty: 90 CAPSULE | Refills: 1 | Status: SHIPPED | OUTPATIENT
Start: 2023-05-05

## 2023-05-05 NOTE — ASSESSMENT & PLAN NOTE
Lab Results   Component Value Date    HGBA1C 6 9 (A) 05/05/2023     Increased  - Current medications:  Metformin 1000 mg bid, Jardiance 25 mg daily  - Home glucose readings: fasting between 65-90, and post prandial 160-200  - Diet: eating 2 meals a day-breakfast and lunch  - +Htn: no Htn, not on an ACE  - +Hld: not taking Atorvastatin 40 mg, Lipid Panel recheck  - Urine microalb: 7/2022- normal  - Ophthalmology: 2022  - DM Foot exam: pending  - Dentist: regular  - Pneumo vaccine: done today  - Smoker: no  - Keep log of blood glucose readings  - Encouraged: Diabetic Diet, Regular exercise, Weight loss

## 2023-05-05 NOTE — ASSESSMENT & PLAN NOTE
Lab Results   Component Value Date/Time    CHOLESTEROL 200 02/03/2023 11:04 AM    TRIG 230 (H) 02/03/2023 11:04 AM    TRIG 217 01/14/2014 10:00 AM    HDL 39 (L) 02/03/2023 11:04 AM    HDL 33 01/14/2014 10:00 AM    LDLCALC 115 (H) 02/03/2023 11:04 AM    LDLCALC 131 01/14/2014 10:00 AM     The 10-year ASCVD risk score (Celio SHETH, et al , 2019) is: 20 1%  Continue Atorvastatin  Low Fat Diet, regular Exercise

## 2023-05-05 NOTE — PROGRESS NOTES
Name: Delma Jeans      : 1960      MRN: [id number]  Encounter Provider: Jackie Lutz MD  Encounter Date: 2023   Encounter department: [address] [place] [address]     1  Type 2 diabetes mellitus without complication, without long-term current use of insulin ([facility]ly [facility])  Assessment & Plan:    Lab Results   Component Value Date    HGBA1C 6 9 (A) 2023     Increased  - Current medications:  Metformin 1000 mg bid, Jardiance 25 mg daily  - Home glucose readings: fasting between 65-90, and post prandial 160-200  - Diet: eating 2 meals a day-breakfast and lunch  - +Htn: no Htn, not on an ACE  - +Hld: not taking Atorvastatin 40 mg, Lipid Panel recheck  - Urine microalb: 2022- normal  - Ophthalmology:   - DM Foot exam: pending  - Dentist: regular  - Pneumo vaccine: done today  - Smoker: no  - Keep log of blood glucose readings  - Encouraged: Diabetic Diet, Regular exercise, Weight loss    Orders:  -     POCT hemoglobin A1c  -     multivitamin-minerals (CENTRUM ADULTS) tablet; Take 1 tablet by mouth daily SUPPLEMENT  -     Multiple Vitamins-Minerals (Ocuvite-Lutein) CAPS; Take 1 capsule by mouth daily  -     metFORMIN (GLUCOPHAGE) 1000 MG tablet; Take 1 tablet (1,000 mg total) by mouth 2 (two) times a day  -     Empagliflozin (Jardiance) 25 MG TABS; Take 1 tablet (25 mg total) by mouth daily    2  Mixed hyperlipidemia  Assessment & Plan:  Lab Results   Component Value Date/Time    CHOLESTEROL 200 2023 11:04 AM    TRIG 230 (H) 2023 11:04 AM    TRIG 217 2014 10:00 AM    HDL 39 (L) 2023 11:04 AM    HDL 33 2014 10:00 AM    LDLCALC 115 (H) 2023 11:04 AM    LDLCALC 131 2014 10:00 AM     The 10-year ASCVD risk score (Celio SHETH, et al , 2019) is: 20 1%  Continue Atorvastatin  Low Fat Diet, regular Exercise      Orders:  -     atorvastatin (LIPITOR) 40 mg tablet; Take 1 tablet (40 mg total) by mouth daily    3   Class 1 obesity due to excess calories with serious comorbidity and body mass index (BMI) of 33 0 to 33 9 in adult  Assessment & Plan: Body mass index is 33 13 kg/m²  Encouraged weight loss      4  Chronic left shoulder pain  Assessment & Plan:  Increased left shoulder pain again  Will refer back to Ortho      5  Rash  -     triamcinolone (KENALOG) 0 1 % cream; Apply topically 2 (two) times a day    6  Seasonal allergies  -     loratadine (CLARITIN) 10 mg tablet; Take 1 tablet (10 mg total) by mouth daily    7  Benign prostatic hyperplasia with nocturia  -     tamsulosin (FLOMAX) 0 4 mg; Take 1 capsule (0 4 mg total) by mouth daily with dinner         Subjective      Shaheed Craft is a 58 y o  male  has a past medical history of Diabetes mellitus (HCC)who presented to the office today  For follow up of chronic conditions  The patient is concerned about a Jardiance recall and stopped taking his medication  He has completed Physical Therapy for his left shoulder with imrpovement, but does still have pain at night  This wakes him up and then he needs to change his position  He is accompanied by his son Claudene Huguenin, who helped to translate  Shoulder Pain   Pertinent negatives include no fever  Review of Systems   Constitutional: Negative for chills and fever  HENT: Negative for congestion, rhinorrhea and sore throat  Respiratory: Negative for cough and shortness of breath  Cardiovascular: Negative for chest pain  Gastrointestinal: Negative for diarrhea, nausea and vomiting  Skin: Negative for rash  Allergic/Immunologic: Positive for environmental allergies  Neurological: Negative for dizziness and headaches         Current Outpatient Medications:     atorvastatin (LIPITOR) 40 mg tablet, Take 1 tablet (40 mg total) by mouth daily, Disp: 90 tablet, Rfl: 1    Empagliflozin (Jardiance) 25 MG TABS, Take 1 tablet (25 mg total) by mouth daily, Disp: 90 tablet, Rfl: 1    loratadine (CLARITIN) 10 mg tablet, Take 1 tablet (10 "mg total) by mouth daily, Disp: 90 tablet, Rfl: 1    metFORMIN (GLUCOPHAGE) 1000 MG tablet, Take 1 tablet (1,000 mg total) by mouth 2 (two) times a day, Disp: 180 tablet, Rfl: 5    Multiple Vitamins-Minerals (Ocuvite-Lutein) CAPS, Take 1 capsule by mouth daily, Disp: 90 capsule, Rfl: 1    multivitamin-minerals (CENTRUM ADULTS) tablet, Take 1 tablet by mouth daily SUPPLEMENT, Disp: 90 tablet, Rfl: 1    tamsulosin (FLOMAX) 0 4 mg, Take 1 capsule (0 4 mg total) by mouth daily with dinner, Disp: 30 capsule, Rfl: 11    triamcinolone (KENALOG) 0 1 % cream, Apply topically 2 (two) times a day, Disp: 30 g, Rfl: 0    Blood Glucose Monitoring Suppl (OneTouch Verio) w/Device KIT, Use 2 (two) times a day, Disp: 1 kit, Rfl: 0    cyclobenzaprine (FLEXERIL) 10 mg tablet, Take 1 tablet (10 mg total) by mouth 3 (three) times a day as needed for muscle spasms, Disp: 30 tablet, Rfl: 1    Diclofenac Sodium (VOLTAREN) 1 %, Apply 2 g topically 4 (four) times a day, Disp: 150 g, Rfl: 1    glucose blood (OneTouch Verio) test strip, Use as instructed, Disp: 100 strip, Rfl: 2    Lancets (onetouch ultrasoft) lancets, Use as instructed, Disp: 100 each, Rfl: 2    naproxen (Naprosyn) 500 mg tablet, Take 1 tablet (500 mg total) by mouth 2 (two) times a day with meals, Disp: 60 tablet, Rfl: 1    sildenafil (VIAGRA) 25 MG tablet, Take 1 tablet (25 mg total) by mouth daily as needed for erectile dysfunction, Disp: 10 tablet, Rfl: 0        Objective     /74 (BP Location: Left arm, Patient Position: Sitting, Cuff Size: Standard)   Pulse 85   Temp 98 2 °F (36 8 °C) (Temporal)   Resp 18   Ht 5' 4\" (1 626 m)   Wt 87 5 kg (193 lb)   SpO2 97%   BMI 33 13 kg/m²     Physical Exam  Vitals and nursing note reviewed  Constitutional:       Appearance: He is well-developed  HENT:      Head: Normocephalic and atraumatic        Right Ear: External ear normal       Left Ear: External ear normal    Cardiovascular:      Rate and Rhythm: " Normal rate and regular rhythm  Heart sounds: Normal heart sounds  No murmur heard  Pulmonary:      Effort: Pulmonary effort is normal  No respiratory distress  Breath sounds: Normal breath sounds  Abdominal:      General: Bowel sounds are normal  There is no distension  Palpations: Abdomen is soft  Musculoskeletal:      Right lower leg: No edema  Left lower leg: No edema  Neurological:      Mental Status: He is alert and oriented to person, place, and time     Psychiatric:         Mood and Affect: Mood normal        Enrique Jimenez MD

## 2023-05-10 ENCOUNTER — PATIENT OUTREACH (OUTPATIENT)
Dept: FAMILY MEDICINE CLINIC | Facility: CLINIC | Age: 63
End: 2023-05-10

## 2023-05-10 DIAGNOSIS — Z74.8 ASSISTANCE NEEDED WITH TRANSPORTATION: Primary | ICD-10-CM

## 2023-05-10 NOTE — PROGRESS NOTES
CARLIE GUERRERO did receive IB message from provider Nini Greer regarding Pt needs assistance with transportation  After chart review CARLIE GUERRERO attempted to reach out Pt and also, placed a call to Pt's son Ellie Rodriguez with no response, CARLIE GUERRERO was able to leave a detailed VM requesting a returned call  CARLIE GUERRERO will attempt to call Pt again  CARLIE GUERRERO is remain available for further assistance as needed

## 2023-05-25 ENCOUNTER — APPOINTMENT (OUTPATIENT)
Dept: LAB | Age: 63
End: 2023-05-25

## 2023-05-25 DIAGNOSIS — E11.29 TYPE 2 DIABETES MELLITUS WITH OTHER KIDNEY COMPLICATION, UNSPECIFIED WHETHER LONG TERM INSULIN USE (HCC): ICD-10-CM

## 2023-05-25 LAB
ANION GAP SERPL CALCULATED.3IONS-SCNC: 2 MMOL/L (ref 4–13)
BUN SERPL-MCNC: 14 MG/DL (ref 5–25)
CALCIUM SERPL-MCNC: 9.1 MG/DL (ref 8.3–10.1)
CHLORIDE SERPL-SCNC: 108 MMOL/L (ref 96–108)
CHOLEST SERPL-MCNC: 118 MG/DL
CO2 SERPL-SCNC: 26 MMOL/L (ref 21–32)
CREAT SERPL-MCNC: 1 MG/DL (ref 0.6–1.3)
GFR SERPL CREATININE-BSD FRML MDRD: 80 ML/MIN/1.73SQ M
GLUCOSE P FAST SERPL-MCNC: 103 MG/DL (ref 65–99)
HDLC SERPL-MCNC: 39 MG/DL
LDLC SERPL CALC-MCNC: 48 MG/DL (ref 0–100)
NONHDLC SERPL-MCNC: 79 MG/DL
POTASSIUM SERPL-SCNC: 4.4 MMOL/L (ref 3.5–5.3)
SODIUM SERPL-SCNC: 136 MMOL/L (ref 135–147)
TRIGL SERPL-MCNC: 157 MG/DL

## 2023-06-19 ENCOUNTER — PATIENT OUTREACH (OUTPATIENT)
Dept: FAMILY MEDICINE CLINIC | Facility: CLINIC | Age: 63
End: 2023-06-19

## 2023-06-19 NOTE — LETTER
[address]  [phone]    Re: Care coordination   6/19/2023       Dear Radha Murphy,    We tried to reach you by phone on 5/10/23 and 6/19/23 and was unfortunately unable to reach you  It is important that you contact the [facility] as soon as possible at: [phone]      Sincerely,         [address]

## 2023-06-19 NOTE — PROGRESS NOTES
CARLIE GUERRERO did place another call to Pt but he did no  the phone, CARLIE GUERRERO was able to leave a VM requesting a returned call  CARLIE GUERRERO will send Unable to Reach Letter  CARLIE GUERRERO is closing this referral today due to unable to contact Pt  CARLIE GUERRERO is remain available for further assistance as needed

## 2023-08-07 ENCOUNTER — OFFICE VISIT (OUTPATIENT)
Dept: FAMILY MEDICINE CLINIC | Facility: CLINIC | Age: 63
End: 2023-08-07

## 2023-08-07 VITALS
SYSTOLIC BLOOD PRESSURE: 110 MMHG | OXYGEN SATURATION: 99 % | DIASTOLIC BLOOD PRESSURE: 70 MMHG | HEIGHT: 64 IN | TEMPERATURE: 96.7 F | WEIGHT: 193 LBS | RESPIRATION RATE: 16 BRPM | BODY MASS INDEX: 32.95 KG/M2 | HEART RATE: 98 BPM

## 2023-08-07 DIAGNOSIS — Z12.11 SCREEN FOR COLON CANCER: ICD-10-CM

## 2023-08-07 DIAGNOSIS — J30.2 SEASONAL ALLERGIES: ICD-10-CM

## 2023-08-07 DIAGNOSIS — E66.09 CLASS 1 OBESITY DUE TO EXCESS CALORIES WITH SERIOUS COMORBIDITY AND BODY MASS INDEX (BMI) OF 33.0 TO 33.9 IN ADULT: ICD-10-CM

## 2023-08-07 DIAGNOSIS — E11.9 TYPE 2 DIABETES MELLITUS WITHOUT COMPLICATION, WITHOUT LONG-TERM CURRENT USE OF INSULIN (HCC): Primary | ICD-10-CM

## 2023-08-07 DIAGNOSIS — M19.012 ARTHRITIS OF LEFT ACROMIOCLAVICULAR JOINT: ICD-10-CM

## 2023-08-07 DIAGNOSIS — E78.2 MIXED HYPERLIPIDEMIA: ICD-10-CM

## 2023-08-07 DIAGNOSIS — R21 RASH: ICD-10-CM

## 2023-08-07 DIAGNOSIS — G89.29 CHRONIC LEFT SHOULDER PAIN: ICD-10-CM

## 2023-08-07 DIAGNOSIS — M25.512 CHRONIC LEFT SHOULDER PAIN: ICD-10-CM

## 2023-08-07 DIAGNOSIS — E11.29 TYPE 2 DIABETES MELLITUS WITH OTHER KIDNEY COMPLICATION, UNSPECIFIED WHETHER LONG TERM INSULIN USE (HCC): ICD-10-CM

## 2023-08-07 LAB — SL AMB POCT HEMOGLOBIN AIC: 7.4 (ref ?–6.5)

## 2023-08-07 PROCEDURE — 83036 HEMOGLOBIN GLYCOSYLATED A1C: CPT | Performed by: FAMILY MEDICINE

## 2023-08-07 PROCEDURE — 99214 OFFICE O/P EST MOD 30 MIN: CPT | Performed by: FAMILY MEDICINE

## 2023-08-07 RX ORDER — MULTIVITAMIN/IRON/FOLIC ACID 18MG-0.4MG
1 TABLET ORAL DAILY
Qty: 90 TABLET | Refills: 1 | Status: SHIPPED | OUTPATIENT
Start: 2023-08-07

## 2023-08-07 RX ORDER — NAPROXEN 500 MG/1
500 TABLET ORAL 2 TIMES DAILY WITH MEALS
Qty: 60 TABLET | Refills: 1 | Status: SHIPPED | OUTPATIENT
Start: 2023-08-07

## 2023-08-07 RX ORDER — LORATADINE 10 MG/1
10 TABLET ORAL DAILY
Qty: 90 TABLET | Refills: 1 | Status: SHIPPED | OUTPATIENT
Start: 2023-08-07

## 2023-08-07 RX ORDER — VIT C/E/ZN/COPPR/LUTEIN/ZEAXAN 60 MG-6 MG
1 CAPSULE ORAL DAILY
Qty: 90 CAPSULE | Refills: 1 | Status: SHIPPED | OUTPATIENT
Start: 2023-08-07

## 2023-08-07 RX ORDER — LANCETS
EACH MISCELLANEOUS
Qty: 100 EACH | Refills: 2 | Status: SHIPPED | OUTPATIENT
Start: 2023-08-07

## 2023-08-07 RX ORDER — TRIAMCINOLONE ACETONIDE 1 MG/G
CREAM TOPICAL 2 TIMES DAILY
Qty: 30 G | Refills: 0 | Status: SHIPPED | OUTPATIENT
Start: 2023-08-07

## 2023-08-07 RX ORDER — BLOOD SUGAR DIAGNOSTIC
STRIP MISCELLANEOUS
Qty: 100 STRIP | Refills: 2 | Status: SHIPPED | OUTPATIENT
Start: 2023-08-07

## 2023-08-07 NOTE — ASSESSMENT & PLAN NOTE
Lab Results   Component Value Date    HGBA1C 6.9 (A) 05/05/2023     Increased from 6.9 to 7.4  Pt does not want to increase his medications and will try dietary changes  - Current medications:  Metformin 1000 mg bid, Jardiance 25 mg daily  - Home glucose readings: fasting between 65-90, and post prandial 160-200  - Diet: eating 2 meals a day-breakfast and lunch  - +Htn: no Htn, not on an ACE  - +Hld: taking Atorvastatin 40 mg  - Urine microalb: 7/2022- normal  - Ophthalmology: 2023  - DM Foot exam: completed today  - Dentist: regular  - Pneumo vaccine: done today  - Smoker: no  - Keep log of blood glucose readings  - Encouraged: Diabetic Diet, Regular exercise, Weight loss

## 2023-08-07 NOTE — ASSESSMENT & PLAN NOTE
Lab Results   Component Value Date/Time    CHOLESTEROL 118 05/25/2023 08:54 AM    TRIG 157 (H) 05/25/2023 08:54 AM    TRIG 217 01/14/2014 10:00 AM    HDL 39 (L) 05/25/2023 08:54 AM    HDL 33 01/14/2014 10:00 AM    LDLCALC 48 05/25/2023 08:54 AM    LDLCALC 131 01/14/2014 10:00 AM     The 10-year ASCVD risk score: 20.1%  Continue Atorvastatin 40 mg daily  Recheck Lipid panel  Low Fat Diet, regular Exercise

## 2023-08-08 ENCOUNTER — TELEPHONE (OUTPATIENT)
Dept: FAMILY MEDICINE CLINIC | Facility: CLINIC | Age: 63
End: 2023-08-08

## 2023-08-08 NOTE — TELEPHONE ENCOUNTER
Hi, this is Kenzie Law calling at the [employer] in [facility] concerning 2 scripts that were sent over today from doctor Clifton Lesch. It's for a patient Alena Tidwell last name is F as in [facility] birth date of 1960. They sent over script for both the One Touch Verio test strips and the One Touch Ultra soft handsets. But it just says use as instructed. We can't do that because we have to have some type of frequency. How often? He used to be testing per day his blood glucose. So it has to actually specifically say on the prescription how often the doctor wants to test the blood glucose a day. We can't use use as instructed as valid directions. If you could please send it over new scripts with the how often the doctor wants him to test this blood glucose or call us. It's our phone number [phone]. Or you can just send new prescriptions electronically with the actual detailed instructions on how often you should test. Again, our number [phone]. Thank you very much. Jhonatan Schaefer.

## 2023-11-07 ENCOUNTER — OFFICE VISIT (OUTPATIENT)
Dept: FAMILY MEDICINE CLINIC | Facility: CLINIC | Age: 63
End: 2023-11-07

## 2023-11-07 VITALS
HEART RATE: 79 BPM | TEMPERATURE: 97.3 F | HEIGHT: 64 IN | RESPIRATION RATE: 18 BRPM | BODY MASS INDEX: 32.2 KG/M2 | OXYGEN SATURATION: 99 % | DIASTOLIC BLOOD PRESSURE: 78 MMHG | WEIGHT: 188.6 LBS | SYSTOLIC BLOOD PRESSURE: 128 MMHG

## 2023-11-07 DIAGNOSIS — E11.9 TYPE 2 DIABETES MELLITUS WITHOUT COMPLICATION, WITHOUT LONG-TERM CURRENT USE OF INSULIN (HCC): Primary | ICD-10-CM

## 2023-11-07 DIAGNOSIS — R21 RASH: ICD-10-CM

## 2023-11-07 DIAGNOSIS — Z23 ENCOUNTER FOR IMMUNIZATION: ICD-10-CM

## 2023-11-07 PROBLEM — S91.101A OPEN WOUND OF RIGHT GREAT TOE: Status: RESOLVED | Noted: 2023-03-10 | Resolved: 2023-11-07

## 2023-11-07 PROBLEM — R22.9 MASS OF SKIN: Status: RESOLVED | Noted: 2018-10-22 | Resolved: 2023-11-07

## 2023-11-07 LAB — SL AMB POCT HEMOGLOBIN AIC: 6.9 (ref ?–6.5)

## 2023-11-07 PROCEDURE — 90686 IIV4 VACC NO PRSV 0.5 ML IM: CPT | Performed by: FAMILY MEDICINE

## 2023-11-07 PROCEDURE — 90471 IMMUNIZATION ADMIN: CPT | Performed by: FAMILY MEDICINE

## 2023-11-07 PROCEDURE — 83036 HEMOGLOBIN GLYCOSYLATED A1C: CPT | Performed by: FAMILY MEDICINE

## 2023-11-07 PROCEDURE — 99213 OFFICE O/P EST LOW 20 MIN: CPT | Performed by: FAMILY MEDICINE

## 2023-11-07 RX ORDER — TRIAMCINOLONE ACETONIDE 1 MG/G
CREAM TOPICAL 2 TIMES DAILY
Qty: 30 G | Refills: 1 | Status: SHIPPED | OUTPATIENT
Start: 2023-11-07

## 2023-11-07 RX ORDER — BLOOD-GLUCOSE METER
EACH MISCELLANEOUS
Qty: 100 STRIP | Refills: 2 | Status: SHIPPED | OUTPATIENT
Start: 2023-11-07

## 2023-11-07 NOTE — ASSESSMENT & PLAN NOTE
Lab Results   Component Value Date    HGBA1C 6.9 (A) 11/07/2023     Improved from 7.4 to 6.9  - Current medications:  Metformin 1000 mg bid, Jardiance 25 mg daily  - Diet: eating 2 meals a day-breakfast and lunch  - +Htn: no Htn, not on an ACE  - +Hld: taking Atorvastatin 40 mg  - Urine microalb: 7/2022- normal  - Ophthalmology: 2023  - DM Foot exam: completed 2023  - Dentist: regular  - Pneumo vaccine: 2023  - Smoker: no  - Keep log of blood glucose readings  - Encouraged: Diabetic Diet, Regular exercise, Weight loss

## 2023-11-07 NOTE — PROGRESS NOTES
Name: Brand Canavan      : 1960      MRN: [id number]  Encounter Provider: Tyrell Mcgregor MD  Encounter Date: 2023   Encounter department: [facility]     1. Type 2 diabetes mellitus without complication, without long-term current use of insulin ([facility])  Assessment & Plan:    Lab Results   Component Value Date    HGBA1C 6.9 (A) 2023     Improved from 7.4 to 6.9  - Current medications:  Metformin 1000 mg bid, Jardiance 25 mg daily  - Diet: eating 2 meals a day-breakfast and lunch  - +Htn: no Htn, not on an ACE  - +Hld: taking Atorvastatin 40 mg  - Urine microalb: 2022- normal  - Ophthalmology:   - DM Foot exam: completed   - Dentist: regular  - Pneumo vaccine:   - Smoker: no  - Keep log of blood glucose readings  - Encouraged: Diabetic Diet, Regular exercise, Weight loss    Orders:  -     POCT hemoglobin A1c  -     glucose blood (OneTouch Verio) test strip; Use as instructed    2. Rash  -     triamcinolone (KENALOG) 0.1 % cream; Apply topically 2 (two) times a day    3. Encounter for immunization  -     influenza vaccine, quadrivalent, 0.5 mL, preservative-free, for adult and pediatric patients 6 mos+ (AFLURIA, FLUARIX, FLULAVAL, FLUZONE)           Subjective      HPI  Brand Canavan is a 61 y.o. male  who presented to the office today to follow up for his chronic conditions. He is accompanied by his son. He brings in his blood glucose logs, which frequently is a ER. He thought that meant hypoglycemia it was explained to him that that is actually an error in using glucometer. Otherwise patient states that he is feeling well, his chronic shoulder pain is stable. It does bother him at night sometimes but he is able to sleep.     The following portions of the patient's history were reviewed and updated as appropriate: allergies, current medications, past family history, past medical history, past social history, past surgical history and problem list.    Review of Systems   Constitutional:  Negative for chills and fever. HENT:  Negative for congestion, rhinorrhea and sore throat. Respiratory:  Negative for cough and shortness of breath. Cardiovascular:  Negative for chest pain. Gastrointestinal:  Negative for diarrhea, nausea and vomiting. Skin:  Negative for rash. Allergic/Immunologic: Positive for environmental allergies. Neurological:  Negative for dizziness and headaches.        Current Outpatient Medications on File Prior to Visit   Medication Sig   • atorvastatin (LIPITOR) 40 mg tablet Take 1 tablet (40 mg total) by mouth daily   • Blood Glucose Monitoring Suppl (OneTouch Verio) w/Device KIT Use 2 (two) times a day   • cyclobenzaprine (FLEXERIL) 10 mg tablet Take 1 tablet (10 mg total) by mouth 3 (three) times a day as needed for muscle spasms   • Diclofenac Sodium (VOLTAREN) 1 % Apply 2 g topically 4 (four) times a day   • Empagliflozin (Jardiance) 25 MG TABS Take 1 tablet (25 mg total) by mouth daily   • Lancets (onetouch ultrasoft) lancets Use as instructed   • loratadine (CLARITIN) 10 mg tablet Take 1 tablet (10 mg total) by mouth daily   • metFORMIN (GLUCOPHAGE) 1000 MG tablet Take 1 tablet (1,000 mg total) by mouth 2 (two) times a day   • Multiple Vitamins-Minerals (Ocuvite-Lutein) CAPS Take 1 capsule by mouth daily   • multivitamin-minerals (CENTRUM ADULTS) tablet Take 1 tablet by mouth daily SUPPLEMENT   • naproxen (Naprosyn) 500 mg tablet Take 1 tablet (500 mg total) by mouth 2 (two) times a day with meals   • sildenafil (VIAGRA) 25 MG tablet Take 1 tablet (25 mg total) by mouth daily as needed for erectile dysfunction   • tamsulosin (FLOMAX) 0.4 mg Take 1 capsule (0.4 mg total) by mouth daily with dinner   • [DISCONTINUED] nystatin (MYCOSTATIN) cream Apply topically 2 (two) times a day       Objective     /78 (BP Location: Left arm, Patient Position: Sitting, Cuff Size: Standard)   Pulse 79   Temp (!) 97.3 °F (36.3 °C) (Temporal)   Resp 18   Ht 5' 4" (1.626 m)   Wt 85.5 kg (188 lb 9.6 oz)   SpO2 99%   BMI 32.37 kg/m²     Physical Exam  Vitals and nursing note reviewed. Constitutional:       Appearance: He is well-developed. HENT:      Head: Normocephalic and atraumatic. Right Ear: External ear normal.      Left Ear: External ear normal.   Cardiovascular:      Rate and Rhythm: Normal rate and regular rhythm. Pulses:           Dorsalis pedis pulses are 2+ on the right side and 2+ on the left side. Heart sounds: Normal heart sounds. No murmur heard. Pulmonary:      Effort: Pulmonary effort is normal. No respiratory distress. Breath sounds: Normal breath sounds. Abdominal:      General: Bowel sounds are normal. There is no distension. Palpations: Abdomen is soft. Musculoskeletal:      Right lower leg: No edema. Left lower leg: No edema. Feet:      Right foot:      Skin integrity: No ulcer, skin breakdown, erythema, warmth, callus or dry skin. Left foot:      Skin integrity: No ulcer, skin breakdown, erythema, warmth, callus or dry skin. Neurological:      Mental Status: He is alert and oriented to person, place, and time.    Psychiatric:         Mood and Affect: Mood normal.         Behavior: Behavior normal.       Conner Chandler MD

## 2024-01-09 ENCOUNTER — TELEPHONE (OUTPATIENT)
Dept: FAMILY MEDICINE CLINIC | Facility: CLINIC | Age: 64
End: 2024-01-09

## 2024-01-09 NOTE — TELEPHONE ENCOUNTER
first attempt to contact patient. left message to return my call on answering machine. Appointment needs to be rescheduled.

## 2024-02-06 ENCOUNTER — TELEPHONE (OUTPATIENT)
Dept: FAMILY MEDICINE CLINIC | Facility: CLINIC | Age: 64
End: 2024-02-06

## 2024-02-06 NOTE — TELEPHONE ENCOUNTER
Hi, this is Liborio Adhikari. I got a letter saying I had to reschedule my father's appointment. Corona Adhikari and my number is [phone], but.      Left message for Liborio to call back.

## 2024-02-06 NOTE — TELEPHONE ENCOUNTER
I'm calling from my dad. I've been calling for two days now trying to get a hold of somebody because I got a letter saying we had to reschedule appointment for tomorrow, the 7th. And I can't get through to nobody and I can't reschedule so I don't know. So my father's name is Sena Adhikari, September 1st, 1960. My phone number is [phone]. Try and call me back. I've been calling around like 3/3/30 every time and no one picks up bye.      Appointment rescheduled.

## 2024-02-20 ENCOUNTER — OFFICE VISIT (OUTPATIENT)
Dept: FAMILY MEDICINE CLINIC | Facility: CLINIC | Age: 64
End: 2024-02-20

## 2024-02-20 VITALS
BODY MASS INDEX: 33.12 KG/M2 | HEART RATE: 81 BPM | RESPIRATION RATE: 16 BRPM | WEIGHT: 194 LBS | OXYGEN SATURATION: 99 % | TEMPERATURE: 97.2 F | HEIGHT: 64 IN | DIASTOLIC BLOOD PRESSURE: 64 MMHG | SYSTOLIC BLOOD PRESSURE: 130 MMHG

## 2024-02-20 DIAGNOSIS — L50.8 CHRONIC URTICARIA: ICD-10-CM

## 2024-02-20 DIAGNOSIS — J30.2 SEASONAL ALLERGIES: ICD-10-CM

## 2024-02-20 DIAGNOSIS — Z23 ENCOUNTER FOR IMMUNIZATION: ICD-10-CM

## 2024-02-20 DIAGNOSIS — M25.512 CHRONIC LEFT SHOULDER PAIN: ICD-10-CM

## 2024-02-20 DIAGNOSIS — E11.9 TYPE 2 DIABETES MELLITUS WITHOUT COMPLICATION, WITHOUT LONG-TERM CURRENT USE OF INSULIN (HCC): Primary | ICD-10-CM

## 2024-02-20 DIAGNOSIS — E78.2 MIXED HYPERLIPIDEMIA: ICD-10-CM

## 2024-02-20 DIAGNOSIS — M19.012 ARTHRITIS OF LEFT ACROMIOCLAVICULAR JOINT: ICD-10-CM

## 2024-02-20 DIAGNOSIS — G89.29 CHRONIC LEFT SHOULDER PAIN: ICD-10-CM

## 2024-02-20 LAB — SL AMB POCT HEMOGLOBIN AIC: 7 (ref ?–6.5)

## 2024-02-20 PROCEDURE — 99214 OFFICE O/P EST MOD 30 MIN: CPT | Performed by: FAMILY MEDICINE

## 2024-02-20 PROCEDURE — 83036 HEMOGLOBIN GLYCOSYLATED A1C: CPT | Performed by: FAMILY MEDICINE

## 2024-02-20 RX ORDER — ATORVASTATIN CALCIUM 40 MG/1
40 TABLET, FILM COATED ORAL DAILY
Qty: 90 TABLET | Refills: 1 | Status: SHIPPED | OUTPATIENT
Start: 2024-02-20

## 2024-02-20 RX ORDER — CETIRIZINE HYDROCHLORIDE 10 MG/1
10 TABLET ORAL DAILY
Qty: 30 TABLET | Refills: 5 | Status: SHIPPED | OUTPATIENT
Start: 2024-02-20

## 2024-02-20 NOTE — PROGRESS NOTES
Name: Sena Adhikari      : 1960      MRN: [id number]  Encounter Provider: Bianka Caldera MD  Encounter Date: 2024   Encounter department: [facility]    Assessment & Plan     1. Type 2 diabetes mellitus without complication, without long-term current use of insulin (HCC)  Assessment & Plan:    Lab Results   Component Value Date    HGBA1C 7.0 (A) 2024     Stable 6.9 to 7.0  - Current medications:  Metformin 1000 mg bid, Jardiance 25 mg daily  - Diet: eating 2 meals a day-breakfast and lunch, snacking often  - +Htn: no Htn, not on an ACE  - +Hld: taking Atorvastatin 40 mg  - Urine microalb: 2022- normal  - Ophthalmology:   - DM Foot exam: completed   - Dentist: regular  - Pneumo vaccine:   - Smoker: no  - Keep log of blood glucose readings  - Encouraged: Diabetic Diet, Regular exercise, Weight loss    Orders:  -     POCT hemoglobin A1c  -     Empagliflozin (Jardiance) 25 MG TABS; Take 1 tablet (25 mg total) by mouth daily  -     Albumin / creatinine urine ratio; Future; Expected date: 2024  -     Comprehensive metabolic panel; Future; Expected date: 2024  -     Hemoglobin A1C; Future; Expected date: 2024    2. Chronic urticaria  Assessment & Plan:  Unclear the cause of the urticaria  Possibly a food vs saunas  Advised pt to start an elimination diet  Also try to avoid sauna for a while and see if helps    Orders:  -     cetirizine (ZyrTEC) 10 mg tablet; Take 1 tablet (10 mg total) by mouth daily    3. Seasonal allergies  -     cetirizine (ZyrTEC) 10 mg tablet; Take 1 tablet (10 mg total) by mouth daily    4. Chronic left shoulder pain  Assessment & Plan:  Increased left shoulder pain again  Will refer back to Ortho for steroid injection    Orders:  -     Ambulatory referral to Orthopedic Surgery; Future  -     Diclofenac Sodium (VOLTAREN) 1 %; Apply 2 g topically 4 (four) times a day    5. Mixed hyperlipidemia  -     atorvastatin  (LIPITOR) 40 mg tablet; Take 1 tablet (40 mg total) by mouth daily    6. Arthritis of left acromioclavicular joint  -     Diclofenac Sodium (VOLTAREN) 1 %; Apply 2 g topically 4 (four) times a day    7. Encounter for immunization  -     Zoster Vaccine Recombinant IM      BMI Counseling: Body mass index is 33.3 kg/m². The BMI is above normal. Nutrition recommendations include decreasing portion sizes, encouraging healthy choices of fruits and vegetables, decreasing fast food intake, consuming healthier snacks, limiting drinks that contain sugar and moderation in carbohydrate intake. Exercise recommendations include moderate physical activity 150 minutes/week. Rationale for BMI follow-up plan is due to patient being overweight or obese.     Depression Screening and Follow-up Plan: Patient was screened for depression during today's encounter. They screened negative with a PHQ-2 score of 0.        Subjective      HPI  Sena Adhikari is a 63 y.o. male  has a past medical history of Diabetes mellitus (HCC). who presented to the office today to follow up for Diabetes.    Pt states that for the past 6 months or so, he gets a rash and intense itching all over his body after eating certain foods.  He thinks it may be the seaweed, but is not sure.    C/o of continued left shoulder pain.  It wkes him up at night for about 2 hours, and then tries to sleep and change positions.  His son has tried to massage the jeremie with not much relief.  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    No weakness or numbness or pain raditing to the left arm.        The following portions of the patient's history were reviewed and updated as appropriate: allergies, current medications, past family history, past medical history, past social history, past surgical history and problem list.    Review of Systems   Constitutional:  Negative for chills and fever.   HENT:  Negative for congestion, rhinorrhea and  sore throat.    Respiratory:  Negative for cough and shortness of breath.    Cardiovascular:  Negative for chest pain.   Gastrointestinal:  Negative for diarrhea, nausea and vomiting.   Skin:  Negative for rash.   Allergic/Immunologic: Positive for environmental allergies.   Neurological:  Negative for dizziness and headaches.       Current Outpatient Medications on File Prior to Visit   Medication Sig   • Blood Glucose Monitoring Suppl (OneTouch Verio) w/Device KIT Use 2 (two) times a day   • glucose blood (OneTouch Verio) test strip Use as instructed   • Lancets (onetouch ultrasoft) lancets Use as instructed   • metFORMIN (GLUCOPHAGE) 1000 MG tablet Take 1 tablet (1,000 mg total) by mouth 2 (two) times a day   • Multiple Vitamins-Minerals (Ocuvite-Lutein) CAPS Take 1 capsule by mouth daily   • multivitamin-minerals (CENTRUM ADULTS) tablet Take 1 tablet by mouth daily SUPPLEMENT   • naproxen (Naprosyn) 500 mg tablet Take 1 tablet (500 mg total) by mouth 2 (two) times a day with meals   • sildenafil (VIAGRA) 25 MG tablet Take 1 tablet (25 mg total) by mouth daily as needed for erectile dysfunction   • tamsulosin (FLOMAX) 0.4 mg Take 1 capsule (0.4 mg total) by mouth daily with dinner   • triamcinolone (KENALOG) 0.1 % cream Apply topically 2 (two) times a day   • [DISCONTINUED] atorvastatin (LIPITOR) 40 mg tablet Take 1 tablet (40 mg total) by mouth daily   • [DISCONTINUED] cyclobenzaprine (FLEXERIL) 10 mg tablet Take 1 tablet (10 mg total) by mouth 3 (three) times a day as needed for muscle spasms   • [DISCONTINUED] Diclofenac Sodium (VOLTAREN) 1 % Apply 2 g topically 4 (four) times a day   • [DISCONTINUED] Empagliflozin (Jardiance) 25 MG TABS Take 1 tablet (25 mg total) by mouth daily   • [DISCONTINUED] loratadine (CLARITIN) 10 mg tablet Take 1 tablet (10 mg total) by mouth daily   • [DISCONTINUED] nystatin (MYCOSTATIN) cream Apply topically 2 (two) times a day       Objective     /64 (BP Location: Right arm,  "Patient Position: Sitting, Cuff Size: Standard)   Pulse 81   Temp (!) 97.2 °F (36.2 °C) (Temporal)   Resp 16   Ht 5' 4\" (1.626 m)   Wt 88 kg (194 lb)   SpO2 99%   BMI 33.30 kg/m²     Physical Exam  Vitals and nursing note reviewed.   Constitutional:       Appearance: He is well-developed.   HENT:      Head: Normocephalic and atraumatic.      Right Ear: External ear normal.      Left Ear: External ear normal.   Cardiovascular:      Rate and Rhythm: Normal rate and regular rhythm.      Heart sounds: Normal heart sounds. No murmur heard.  Pulmonary:      Effort: Pulmonary effort is normal. No respiratory distress.      Breath sounds: Normal breath sounds.   Abdominal:      General: Bowel sounds are normal. There is no distension.      Palpations: Abdomen is soft.   Musculoskeletal:      Left shoulder: Tenderness present. Normal range of motion.      Right lower leg: No edema.      Left lower leg: No edema.      Comments: + left anterior shoulder tenderness AC joint  FROM   Neurological:      Mental Status: He is alert and oriented to person, place, and time.   Psychiatric:         Mood and Affect: Mood normal.       Bianka Caldera MD    "

## 2024-02-20 NOTE — ASSESSMENT & PLAN NOTE
Lab Results   Component Value Date    HGBA1C 7.0 (A) 02/20/2024     Stable 6.9 to 7.0  - Current medications:  Metformin 1000 mg bid, Jardiance 25 mg daily  - Diet: eating 2 meals a day-breakfast and lunch, snacking often  - +Htn: no Htn, not on an ACE  - +Hld: taking Atorvastatin 40 mg  - Urine microalb: 7/2022- normal  - Ophthalmology: 2023  - DM Foot exam: completed 2023  - Dentist: regular  - Pneumo vaccine: 2023  - Smoker: no  - Keep log of blood glucose readings  - Encouraged: Diabetic Diet, Regular exercise, Weight loss

## 2024-02-20 NOTE — PATIENT INSTRUCTIONS
??????????   ??????   ??????????????????????????????????????????????????????????????????????  ?????   ?????????????????????????? 911??  ????????    ???? 50 mg/dL?????????    ????????    ?????    ?????????????????????????  ????????????    ??????????????????????    ????????????????    ???  ????????? ????????    ???????????? ????????????    ??????? ?????????????????????????????????????????????????????????????????????????????????????????????????????????    ???????  ???????????????????? ????? ? 70 mg/dL ???????????????????????????????????????????    ??????????????? 15 ???????????? ??????????????????? 4 ???½ ????? 4 ??????????2 ??????? 1 ???????     15 ?????????????????????????? 100 mg/dL????? 15 ??????????????? 100 mg/dL ??????????????????? 1 ????????????????????????    3/4 ???    1 ????????    6 ?????    1/2 ??????    15 ?????  ???????????????????????????????  ???????????????? ????????????????????????????????????????????????????????????????????????????????????????????????????????????????????????????????????????????    ?????????????????????? ???????????????????????????????????????????????????????????????????????????????????? ??????????? 911? ??????????????????????????????????????    ????????? ?????????????????????????       ???????  ?????????? ?????????????????????????????????? ?? ??????????????????????????????????????????????????????????????????????????????????????????????????????????????????????????????????????????????????????????    ?????? ?????? 24 ?????????????????????????????????????         ????? ?????????????????       ?????????? ??????????????????????????????         ????????????? ???????????????????????????????????????????????????????????????????????? 3 ??????????????????????????????????????????????????????????????????????????? (CGM)?CGM ?????????????CGM ? 5 ????????????????????????????????            ?????????????? ????????????????????????? 100 mg/dL??????????????????????? 4 ? 6 ?????????8 ????????? 1% ???? 4  ??????????????????? 1 ????????? 30 min ????????????????????????????????    ??????????? ????????????????????????????????????????????????????? 21 ????? 65 ???????????????? 1 ???? 21 ? 64 ?????????????? 2 ????????? 12 ?????5 ?????? 1.5 ?????    ?????????????????????? ???????????????????  © Copyright [employer] 2023 Information is for End User's use only and may not be sold, redistributed or otherwise used for commercial purposes.  The above information is an  only. It is not intended as medical advice for individual conditions or treatments. Talk to your doctor, nurse or pharmacist before following any medical regimen to see if it is safe and effective for you.

## 2024-02-20 NOTE — ASSESSMENT & PLAN NOTE
Unclear the cause of the urticaria  Possibly a food vs saunas  Advised pt to start an elimination diet  Also try to avoid sauna for a while and see if helps

## 2024-02-23 ENCOUNTER — CLINICAL SUPPORT (OUTPATIENT)
Dept: FAMILY MEDICINE CLINIC | Facility: CLINIC | Age: 64
End: 2024-02-23

## 2024-02-23 DIAGNOSIS — E11.9 TYPE 2 DIABETES MELLITUS WITHOUT COMPLICATION, WITHOUT LONG-TERM CURRENT USE OF INSULIN (HCC): Primary | ICD-10-CM

## 2024-02-23 PROCEDURE — 90471 IMMUNIZATION ADMIN: CPT

## 2024-02-23 PROCEDURE — 90750 HZV VACC RECOMBINANT IM: CPT

## 2024-03-01 DIAGNOSIS — L50.8 CHRONIC URTICARIA: ICD-10-CM

## 2024-03-01 DIAGNOSIS — E11.9 TYPE 2 DIABETES MELLITUS WITHOUT COMPLICATION, WITHOUT LONG-TERM CURRENT USE OF INSULIN (HCC): ICD-10-CM

## 2024-03-01 DIAGNOSIS — J30.2 SEASONAL ALLERGIES: ICD-10-CM

## 2024-03-01 RX ORDER — MULTIVIT-MIN/IRON FUM/FOLIC AC 7.5 MG-4
1 TABLET ORAL DAILY
Qty: 90 TABLET | Refills: 1 | Status: SHIPPED | OUTPATIENT
Start: 2024-03-01

## 2024-03-01 RX ORDER — CETIRIZINE HYDROCHLORIDE 10 MG/1
10 TABLET, CHEWABLE ORAL DAILY
Qty: 30 TABLET | Refills: 2 | Status: SHIPPED | OUTPATIENT
Start: 2024-03-01

## 2024-03-04 ENCOUNTER — OFFICE VISIT (OUTPATIENT)
Dept: DENTISTRY | Facility: CLINIC | Age: 64
End: 2024-03-04

## 2024-03-04 VITALS — DIASTOLIC BLOOD PRESSURE: 71 MMHG | SYSTOLIC BLOOD PRESSURE: 108 MMHG | TEMPERATURE: 97.9 F | HEART RATE: 81 BPM

## 2024-03-04 DIAGNOSIS — Z01.20 ENCOUNTER FOR DENTAL EXAMINATION: Primary | ICD-10-CM

## 2024-03-04 PROCEDURE — WIS5000 PRELIMINARY IMPRESSIONS

## 2024-03-05 NOTE — PROGRESS NOTES
Removable    Pt presents for a denture visit and gave verbal consent for treatment:    Reviewed health history - Pt is ASA Class II    Treatment consents signed: Yes    Perio: Non applicable    Pain Scale: 0    Caries Assessment: Non applicable     Radiographs: Films are current    Oral Hygiene instruction reviewed    Annual evaluation of soft tissue recommended    Explained denture making process to patient, including the necessary number of visits and timeframe to make denture. Reviewed denture expectations, adjustment period, and hygiene.     Upper and lower silgimix impressions made using stock trays.     NV: Border molding and final impression

## 2024-03-11 ENCOUNTER — OFFICE VISIT (OUTPATIENT)
Dept: OBGYN CLINIC | Facility: MEDICAL CENTER | Age: 64
End: 2024-03-11
Payer: MEDICARE

## 2024-03-11 VITALS — BODY MASS INDEX: 33.8 KG/M2 | HEIGHT: 64 IN | WEIGHT: 198 LBS

## 2024-03-11 DIAGNOSIS — G89.29 CHRONIC LEFT SHOULDER PAIN: Primary | ICD-10-CM

## 2024-03-11 DIAGNOSIS — M25.512 CHRONIC LEFT SHOULDER PAIN: Primary | ICD-10-CM

## 2024-03-11 DIAGNOSIS — M75.82 TENDINITIS OF LEFT ROTATOR CUFF: ICD-10-CM

## 2024-03-11 PROCEDURE — 20610 DRAIN/INJ JOINT/BURSA W/O US: CPT | Performed by: EMERGENCY MEDICINE

## 2024-03-11 PROCEDURE — 99213 OFFICE O/P EST LOW 20 MIN: CPT | Performed by: EMERGENCY MEDICINE

## 2024-03-11 RX ORDER — LIDOCAINE HYDROCHLORIDE 10 MG/ML
4 INJECTION, SOLUTION INFILTRATION; PERINEURAL
Status: COMPLETED | OUTPATIENT
Start: 2024-03-11 | End: 2024-03-11

## 2024-03-11 RX ORDER — TRIAMCINOLONE ACETONIDE 40 MG/ML
40 INJECTION, SUSPENSION INTRA-ARTICULAR; INTRAMUSCULAR
Status: COMPLETED | OUTPATIENT
Start: 2024-03-11 | End: 2024-03-11

## 2024-03-11 RX ADMIN — TRIAMCINOLONE ACETONIDE 40 MG: 40 INJECTION, SUSPENSION INTRA-ARTICULAR; INTRAMUSCULAR at 16:30

## 2024-03-11 RX ADMIN — LIDOCAINE HYDROCHLORIDE 4 ML: 10 INJECTION, SOLUTION INFILTRATION; PERINEURAL at 16:30

## 2024-03-11 NOTE — PROGRESS NOTES
Assessment/Plan:    Diagnoses and all orders for this visit:    Chronic left shoulder pain  -     Ambulatory referral to Orthopedic Surgery  -     Large joint arthrocentesis: L subacromial bursa    Tendinitis of left rotator cuff  -     Large joint arthrocentesis: L subacromial bursa    Left SA CSI performed today  He has performed PT in the past as well as prior U/S guided biceps tendon CSI  Reviewed Xrays and prior [facility] note    Return in about 4 weeks (around 4/8/2024).      Subjective:   Patient ID: Sena Adhikari is a 63 y.o. male.    Patient new to me presents with his son for about a month of left shoulder pain denies any injury.  He does have a history of prior left shoulder pain he was treated with physical therapy and an ultrasound-guided biceps tendon injection which did help his symptoms.        Review of Systems    The following portions of the patient's chart were reviewed and updated as appropriate:   Allergy:  No Known Allergies    Medications:    Current Outpatient Medications:     atorvastatin (LIPITOR) 40 mg tablet, Take 1 tablet (40 mg total) by mouth daily, Disp: 90 tablet, Rfl: 1    Blood Glucose Monitoring Suppl (OneTouch Verio) w/Device KIT, Use 2 (two) times a day, Disp: 1 kit, Rfl: 0    cetirizine (ZyrTEC) 10 MG chewable tablet, Chew 1 tablet (10 mg total) daily, Disp: 30 tablet, Rfl: 2    cetirizine (ZyrTEC) 10 mg tablet, Take 1 tablet (10 mg total) by mouth daily, Disp: 30 tablet, Rfl: 5    Diclofenac Sodium (VOLTAREN) 1 %, Apply 2 g topically 4 (four) times a day, Disp: 150 g, Rfl: 1    Empagliflozin (Jardiance) 25 MG TABS, Take 1 tablet (25 mg total) by mouth daily, Disp: 90 tablet, Rfl: 1    glucose blood (OneTouch Verio) test strip, Use as instructed, Disp: 100 strip, Rfl: 2    Lancets (onetouch ultrasoft) lancets, Use as instructed, Disp: 100 each, Rfl: 2    metFORMIN (GLUCOPHAGE) 1000 MG tablet, Take 1 tablet (1,000 mg total) by mouth 2 (two) times a day, Disp: 180 tablet, Rfl: 5     "Multiple Vitamins-Minerals (multivitamin with minerals) tablet, TAKE ONE TABLET BY MOUTH EVERY DAY, Disp: 90 tablet, Rfl: 1    Multiple Vitamins-Minerals (Ocuvite-Lutein) CAPS, Take 1 capsule by mouth daily, Disp: 90 capsule, Rfl: 1    naproxen (Naprosyn) 500 mg tablet, Take 1 tablet (500 mg total) by mouth 2 (two) times a day with meals, Disp: 60 tablet, Rfl: 1    sildenafil (VIAGRA) 25 MG tablet, Take 1 tablet (25 mg total) by mouth daily as needed for erectile dysfunction, Disp: 10 tablet, Rfl: 0    tamsulosin (FLOMAX) 0.4 mg, Take 1 capsule (0.4 mg total) by mouth daily with dinner, Disp: 30 capsule, Rfl: 11    triamcinolone (KENALOG) 0.1 % cream, Apply topically 2 (two) times a day (Patient not taking: Reported on 3/11/2024), Disp: 30 g, Rfl: 1    Patient Active Problem List   Diagnosis    Type 2 diabetes mellitus without complication, without long-term current use of insulin ([facility])    Cholinergic urticaria    Subclinical thyrotoxicosis    Class 1 obesity due to excess calories with serious comorbidity and body mass index (BMI) of 33.0 to 33.9 in adult    Benign prostatic hyperplasia with lower urinary tract symptoms    Microscopic hematuria    Arthritis of left acromioclavicular joint    Chronic left shoulder pain    CKD (chronic kidney disease) stage 2, GFR 60-89 ml/min    Mixed hyperlipidemia    Chronic urticaria       Objective:  Ht 5' 4\" (1.626 m)   Wt 89.8 kg (198 lb)   BMI 33.99 kg/m²     Left Shoulder Exam     Range of Motion   Active abduction:  abnormal   External rotation:  abnormal     Muscle Strength   External rotation: 4/5     Tests   Drop arm: negative    Other   Erythema: absent             Physical Exam      Neurologic Exam    Large joint arthrocentesis: L subacromial bursa  Universal Protocol:  Consent: Verbal consent obtained.  Risks and benefits: risks, benefits and alternatives were discussed  Consent given by: patient  Time out: Immediately prior to procedure a \"time out\" was called to " verify the correct patient, procedure, equipment, support staff and site/side marked as required.  Timeout called at: 3/11/2024 4:53 PM.  Patient understanding: patient states understanding of the procedure being performed  Test results: test results available and properly labeled  Site marked: the operative site was marked  Patient identity confirmed: verbally with patient  Supporting Documentation  Indications: pain   Procedure Details  Location: shoulder - L subacromial bursa  Preparation: Patient was prepped and draped in the usual sterile fashion  Needle size: 22 G  Ultrasound guidance: no  Approach: posterolateral  Medications administered: 4 mL lidocaine 1 %; 40 mg triamcinolone acetonide 40 mg/mL    Patient tolerance: patient tolerated the procedure well with no immediate complications  Dressing:  Sterile dressing applied    No erythema of Shoulder(s)          I have personally reviewed the written report of the pertinent studies.     X-ray shoulder        Past Medical History:   Diagnosis Date    Diabetes mellitus (HCC)        History reviewed. No pertinent surgical history.    Social History     Socioeconomic History    Marital status: Single     Spouse name: Not on file    Number of children: Not on file    Years of education: Not on file    Highest education level: Not on file   Occupational History    Not on file   Tobacco Use    Smoking status: Former     Passive exposure: Past    Smokeless tobacco: Never   Vaping Use    Vaping status: Never Used   Substance and Sexual Activity    Alcohol use: No    Drug use: No    Sexual activity: Not Currently   Other Topics Concern    Not on file   Social History Narrative    No Jehovah's witness beliefs    Primary spoken language Mandarin     Social Determinants of Health     Financial Resource Strain: Low Risk  (2/20/2024)    Overall Financial Resource Strain (CARDIA)     Difficulty of Paying Living Expenses: Not hard at all   Food Insecurity: No Food Insecurity  (2/20/2024)    Hunger Vital Sign     Worried About Running Out of Food in the Last Year: Never true     Ran Out of Food in the Last Year: Never true   Transportation Needs: No Transportation Needs (2/20/2024)    PRAPARE - Transportation     Lack of Transportation (Medical): No     Lack of Transportation (Non-Medical): No   Physical Activity: Not on file   Stress: Not on file   Social Connections: Not on file   Intimate Partner Violence: Not on file   Housing Stability: Unknown (12/28/2022)    Housing Stability Vital Sign     Unable to Pay for Housing in the Last Year: No     Number of Places Lived in the Last Year: Not on file     Unstable Housing in the Last Year: No       History reviewed. No pertinent family history.

## 2024-03-11 NOTE — PATIENT INSTRUCTIONS
You may use Advil (ibuprofen) 400-600mg every 6 hours or at least twice per day (OR Aleve (naproxen) 250-500mg every 12 hours as needed for pain and inflammation).    You may also take Tylenol (acetaminophen) together with Advil (ibuprofen) or Aleve (naproxen) as this is safe and can help decrease your pain levels.  The dosing for Tylenol is 500mg every 4-6 hours as needed OR max 1,000mg per dose up to 3 times per day for a total of 3,000mg per day  *Check with your primary care physician to see if these medications are safe to take and to make sure they do not interfere with your other medications and medical issues.      Rotator Cuff Injury Exercises   AMBULATORY CARE:   What you need to know about rotator cuff injury exercises:  Exercises help improve shoulder movement and strength, and decrease pain. Your physical therapist or healthcare provider will tell you when to start doing the exercises. He or she will tell you how often to do them. You will need to start slowly to prevent more injury. You will move through several levels over time as you get stronger and more flexible.       Safety guidelines:   Always warm up before you do the exercises.  Walk or ride a stationary bike for 5 to 10 minutes to help you warm up.    Do not put your arm over your head until directed.  You will need to wait until your injury has healed. The movement of some exercises could continue until your arm is over your head. You will need to stop the movement where directed.    Stop if you feel pain.  You may feel some tight or stiff areas when you start. This should get better as you continue the exercises. You should not feel pain. Pain means you are not ready to do the exercise yet. Stop and call your physical therapist or healthcare provider right away.    Always work both rotator cuffs.  Even if your injury is only on 1 side, it is important to do each exercise on both sides. This helps prevent injury and maintain balance in your  shoulders and back.    Use correct posture.  Your physical therapist or healthcare provider will show you the proper posture for each exercise. You will be shown how to pull your shoulders back and down to engage the correct muscles. Remember not to let your shoulders shrug during an exercise unless it is part of the movement.    How to do stretching exercises:  You will not feel every exercise in your shoulder area. You may feel some of the stretches in your back, side, or upper arm muscles. You need to work muscles in and around your rotator cuffs and down your arms. This helps stabilize your shoulders. Your physical therapist or healthcare provider will tell you how long to hold each stretch. He or she will also tell you how many times to repeat each stretch during an exercise session. You may be told to do only certain exercises, or to do them in a specific order. The following are general directions to help you remember the exercises you are taught:  Pendulum swings:  Lean forward and rest your arm on a table. Do not round your back or lock your knees during the exercise. Let your other arm hang freely by your side. Gently swing your free arm forward and backward, side to side, and in circles.         Crossover arm stretch:  Relax your shoulders. Hold your upper arm with the opposite hand. Pull your arm across your chest until you feel a stretch. Hold the stretch. Return to the starting position.         Sleeper stretch:  Lie on your side on a firm, flat surface. Bend the elbow of your top arm 90°. Use your other hand to slowly push your arm down. Stop when you feel a stretch at the back of your shoulder. Hold the stretch. Slowly return to the starting position.         Shoulder movement, up and down:  This exercise may also be called shoulder extension. Sit in a chair that has a back but no armrests. Raise your arm like you are reaching for the wall in front of you. Continue to raise the arm until it is over  your head, or as high as directed. Bring your arm back down to your side. Bring it back as far as possible behind your body. Return your arm to the starting position.         Shoulder movement, side to side:  These movements may be called flexion, internal rotation, and external rotation. Sit in a chair that has a back but no armrests. Raise your arm to the side and then up over your head as far as directed. Return your arm to your side. Bring your arm across the front of your body and reach for the opposite shoulder. Return your arm to the starting position.         Shoulder rolls:  Stand and raise both shoulders toward your ears. Lower them to the starting position. Relax your shoulders. Pull your shoulders back. Then relax them again. Roll your shoulders in a smooth Ute. Then roll your shoulders in a smooth Ute in the other direction.         Wall reach exercise:  This may also be called a flexion stretch. Stand facing a wall. Slowly walk your fingers up the wall until you feel a stretch. Hold the stretch. Return to the starting position.         Arm reach exercise:  Lie on your back with your legs straight. Reach your arms like you are trying to touch the ceiling. Reach as high as you can so you feel a stretch in the back of your arms. Hold the stretch. Then lower your arms to your sides.         Elbow bends:  Stand with your arms down to your sides. Keep your palm facing forward. Bend your elbow and try to touch your shoulder with your fingertips. Return your arm to the starting position.         Up the back stretch:  Stand and put both arms behind your back. Put one hand under the other. Move the bottom hand and slowly push the upper hand up toward your head. You should feel a stretch in the front of your arm and shoulder. Be careful not to push too hard. Hold the stretch. Then return to the starting position.         Triceps stretch:  Stand and drop your forearm down your back so your hand is pointing  to the ground behind you. Your elbow should be pointing at the ceiling. Take your other hand and place it on your elbow. Gently and slowly push on the elbow until you feel a stretch in the back of your arm. Hold the stretch. Let go of your elbow and relax your arm. You may be shown how to do this stretch with a towel. The towel can be pulled gently with a hand behind your back at waist level.       How to do strengthening exercises:  Strengthening exercises may include handheld weights or resistance bands. Your physical therapist or healthcare provider will tell you how much weight or resistance to use. The general guide is to use light weights or low resistance and to do a high number of repetitions. You may be told to do only certain exercises, or to do them in a specific order. The following are general directions to help you remember the exercises you are taught:  Scapular squeeze:  Stand with your arms at your sides. Squeeze your shoulder blades together and hold this position. Then relax the muscles. Keep your shoulder back during the entire exercise.         Wall pushups:  This exercise is similar to a pushup done on the ground. The goal is to use your back and shoulder muscles to bring your upper body toward and away from the wall. Stand facing a wall. Put your hands on the wall. Bend your elbows to bring your upper body toward the wall. Straighten your arms to return to the starting position. Keep your feet close enough to the wall that you do not take a step when you bend your elbows.         Standing row with exercise band:  Wrap the exercise band around a heavy, stable object at waist level. Make loop in the end of the band to create a handle, if needed. Hold the handle or loop and pull the band straight back toward your hip. Keep your shoulder down. Squeeze your shoulder blade. Hold this position. Then slowly return to the starting position.         External rotation with arm abducted 90 degrees:  Wrap  the exercise band around a heavy, stable object at waist level. Make loop in the end of the band to create a handle, if needed. Stand and hold the handle or loop. Bend your elbow and raise your arm to shoulder height. Keep your arm in this position. Raise your hand like you are pointing at the ceiling. Slowly return to the starting position. You may also be shown how to do this exercise lying down and with a weight.         Shoulder abduction with weight:  Stand and hold a weight in your hand with your palm facing your body. Slowly raise your arm to the side with your thumb pointing up. Then raise your arm as far as you can without pain. Hold this position. Then return to the starting position.         Shoulder abduction with exercise band:  Wrap the exercise band around a heavy, stable object near your foot. Grab the band. Keep your arm straight. Slowly raise your arm to the side with your thumb pointing up. Then, slowly pull the band as far as you can without pain. Slowly return to the starting position.         Shoulder adduction with weight:  Lie on your back on a firm surface. Hold a weight in your hand at your shoulder. Slowly raise your arm toward the ceiling and straighten your elbow. Hold this position. Then slowly return to the starting position.         Shoulder adduction with exercise band:  Wrap the exercise band around a heavy, stable object. Stand and face away from where the band is anchored. Hold each end of the band in both hands with your elbows bent. Your elbows should not be behind your body. Keep your arms parallel to the floor and slowly straighten your elbows. Hold this position. Slowly return to the starting position.       Call your doctor or physical therapist if:   You have sharp or worsening pain during exercise or at rest.    You have questions or concerns about your rotator cuff injury exercises.    © Copyright [employer] 2023 Information is for End User's use only and may not be sold,  redistributed or otherwise used for commercial purposes.  The above information is an  only. It is not intended as medical advice for individual conditions or treatments. Talk to your doctor, nurse or pharmacist before following any medical regimen to see if it is safe and effective for you.

## 2024-04-25 ENCOUNTER — OFFICE VISIT (OUTPATIENT)
Dept: DENTISTRY | Facility: CLINIC | Age: 64
End: 2024-04-25

## 2024-04-25 VITALS — TEMPERATURE: 97.8 F

## 2024-04-25 DIAGNOSIS — K08.109 COMPLETE EDENTULISM, UNSPECIFIED EDENTULISM CLASS: Primary | ICD-10-CM

## 2024-04-25 PROCEDURE — WIS5001 FINAL IMPRESSIONS DENTURE

## 2024-04-27 DIAGNOSIS — R35.1 BENIGN PROSTATIC HYPERPLASIA WITH NOCTURIA: ICD-10-CM

## 2024-04-27 DIAGNOSIS — N40.1 BENIGN PROSTATIC HYPERPLASIA WITH NOCTURIA: ICD-10-CM

## 2024-04-29 RX ORDER — TAMSULOSIN HYDROCHLORIDE 0.4 MG/1
0.4 CAPSULE ORAL
Qty: 30 CAPSULE | Refills: 11 | Status: SHIPPED | OUTPATIENT
Start: 2024-04-29

## 2024-04-29 NOTE — DENTAL PROCEDURE DETAILS
Denture Final Impression    Sena Adhikari presents for maxillary and mandibular complete denture final impression. PMH reviewed. ASA 2, pain 0. Pt understands extent of denture fabrication process and consents to tx today.     Custom tray modified to properly fit arch and extend short of vestibule. Tray adhesive applied and dried. Border molding performed with heavy body around vestibules then palatal seal. Light body wash performed. Contours, vestibule and intaglio captured.       NV: Wax rim try in

## 2024-05-21 ENCOUNTER — OFFICE VISIT (OUTPATIENT)
Dept: FAMILY MEDICINE CLINIC | Facility: CLINIC | Age: 64
End: 2024-05-21

## 2024-05-21 VITALS
TEMPERATURE: 97.1 F | HEART RATE: 85 BPM | WEIGHT: 188.2 LBS | OXYGEN SATURATION: 96 % | HEIGHT: 64 IN | SYSTOLIC BLOOD PRESSURE: 108 MMHG | RESPIRATION RATE: 14 BRPM | DIASTOLIC BLOOD PRESSURE: 73 MMHG | BODY MASS INDEX: 32.13 KG/M2

## 2024-05-21 DIAGNOSIS — E78.2 MIXED HYPERLIPIDEMIA: ICD-10-CM

## 2024-05-21 DIAGNOSIS — Z12.11 SCREEN FOR COLON CANCER: ICD-10-CM

## 2024-05-21 DIAGNOSIS — Z12.5 SCREENING FOR PROSTATE CANCER: ICD-10-CM

## 2024-05-21 DIAGNOSIS — Z00.00 ANNUAL PHYSICAL EXAM: Primary | ICD-10-CM

## 2024-05-21 DIAGNOSIS — E11.9 TYPE 2 DIABETES MELLITUS WITHOUT COMPLICATION, WITHOUT LONG-TERM CURRENT USE OF INSULIN (HCC): ICD-10-CM

## 2024-05-21 DIAGNOSIS — Z23 ENCOUNTER FOR IMMUNIZATION: ICD-10-CM

## 2024-05-21 DIAGNOSIS — M25.512 CHRONIC LEFT SHOULDER PAIN: ICD-10-CM

## 2024-05-21 DIAGNOSIS — G89.29 CHRONIC LEFT SHOULDER PAIN: ICD-10-CM

## 2024-05-21 LAB — SL AMB POCT HEMOGLOBIN AIC: 6.6 (ref ?–6.5)

## 2024-05-21 PROCEDURE — 90471 IMMUNIZATION ADMIN: CPT

## 2024-05-21 PROCEDURE — 99396 PREV VISIT EST AGE 40-64: CPT | Performed by: FAMILY MEDICINE

## 2024-05-21 PROCEDURE — 83036 HEMOGLOBIN GLYCOSYLATED A1C: CPT | Performed by: FAMILY MEDICINE

## 2024-05-21 PROCEDURE — 90750 HZV VACC RECOMBINANT IM: CPT

## 2024-05-21 RX ORDER — NAPROXEN 500 MG/1
500 TABLET ORAL
Qty: 60 TABLET | Refills: 0 | Status: SHIPPED | OUTPATIENT
Start: 2024-05-21

## 2024-05-21 RX ORDER — ATORVASTATIN CALCIUM 40 MG/1
40 TABLET, FILM COATED ORAL DAILY
Qty: 90 TABLET | Refills: 1 | Status: SHIPPED | OUTPATIENT
Start: 2024-05-21

## 2024-05-21 NOTE — ASSESSMENT & PLAN NOTE
Colon Cancer screening- Pt agrees to Cologaurd and does not want Colonoscopy  PSA screening ordered today  Nonsmoker  DM Eye exam complete  Zoster # 2 today

## 2024-05-21 NOTE — PATIENT INSTRUCTIONS
Wellness Visit for Adults   AMBULATORY CARE:   A wellness visit  is when you see your healthcare provider to get screened for health problems. Your healthcare provider will also give you advice on how to stay healthy. Write down your questions so you remember to ask them. Ask your healthcare provider how often you should have a wellness visit.  What happens at a wellness visit:  Your healthcare provider will ask about your health, and your family history of health problems. This includes high blood pressure, heart disease, and cancer. He or she will ask if you have symptoms that concern you, if you smoke, and about your mood. You may also be asked about your intake of medicines, supplements, food, and alcohol. Any of the following may be done:  Your weight  will be checked. Your height may also be checked so your body mass index (BMI) can be calculated. Your BMI shows if you are at a healthy weight.    Your blood pressure  and heart rate will be checked. Your temperature may also be checked.    Blood and urine tests  may be done. Blood tests may be done to check your cholesterol levels. Abnormal cholesterol levels increase your risk for heart disease and stroke. You may also need a blood or urine test to check for diabetes if you are at increased risk. Urine tests may be done to look for signs of an infection or kidney disease.    A physical exam  includes checking your heartbeat and lungs with a stethoscope. Your healthcare provider may also check your skin to look for sun damage.    Screening tests  may be recommended. A screening test is done to check for diseases that may not cause symptoms. The screening tests you may need depend on your age, gender, family history, and lifestyle habits. For example, colorectal screening may be recommended if you are 50 years old or older.    Screening tests you need if you are a woman:   A Pap smear  is used to screen for cervical cancer. Pap smears are usually done every 3 to  5 years depending on your age. You may need them more often if you have had abnormal Pap smear test results in the past. Ask your healthcare provider how often you should have a Pap smear.    A mammogram  is an x-ray of your breasts to screen for breast cancer. Experts recommend mammograms every 2 years starting at age 50 years. You may need a mammogram at age 49 years or younger if you have an increased risk for breast cancer. Talk to your healthcare provider about when you should start having mammograms and how often you need them.    Vaccines you may need:   Get an influenza vaccine  every year. The influenza vaccine protects you from the flu. Several types of viruses cause the flu. The viruses change over time, so new vaccines are made each year.    Get a tetanus-diphtheria (Td) booster vaccine  every 10 years. This vaccine protects you against tetanus and diphtheria. Tetanus is a severe infection that may cause painful muscle spasms and lockjaw. Diphtheria is a severe bacterial infection that causes a thick covering in the back of your mouth and throat.    Get a human papillomavirus (HPV) vaccine  if you are female and aged 19 to 26 or male 19 to 21 and never received it. This vaccine protects you from HPV infection. HPV is the most common infection spread by sexual contact. HPV may also cause vaginal, penile, and anal cancers.    Get a pneumococcal vaccine  if you are aged 65 years or older. The pneumococcal vaccine is an injection given to protect you from pneumococcal disease. Pneumococcal disease is an infection caused by pneumococcal bacteria. The infection may cause pneumonia, meningitis, or an ear infection.    Get a shingles vaccine  if you are 60 or older, even if you have had shingles before. The shingles vaccine is an injection to protect you from the varicella-zoster virus. This is the same virus that causes chickenpox. Shingles is a painful rash that develops in people who had chickenpox or have  been exposed to the virus.    How to eat healthy:  My Plate is a model for planning healthy meals. It shows the types and amounts of foods that should go on your plate. Fruits and vegetables make up about half of your plate, and grains and protein make up the other half. A serving of dairy is included on the side of your plate. The amount of calories and serving sizes you need depends on your age, gender, weight, and height. Examples of healthy foods are listed below:  Eat a variety of vegetables  such as dark green, red, and orange vegetables. You can also include canned vegetables low in sodium (salt) and frozen vegetables without added butter or sauces.    Eat a variety of fresh fruits , canned fruit in 100% juice, frozen fruit, and dried fruit.    Include whole grains.  At least half of the grains you eat should be whole grains. Examples include whole-wheat bread, wheat pasta, brown rice, and whole-grain cereals such as oatmeal.    Eat a variety of protein foods such as seafood (fish and shellfish), lean meat, and poultry without skin (turkey and chicken). Examples of lean meats include pork leg, shoulder, or tenderloin, and beef round, sirloin, tenderloin, and extra lean ground beef. Other protein foods include eggs and egg substitutes, beans, peas, soy products, nuts, and seeds.    Choose low-fat dairy products such as skim or 1% milk or low-fat yogurt, cheese, and cottage cheese.    Limit unhealthy fats  such as butter, hard margarine, and shortening.       Exercise:  Exercise at least 30 minutes per day on most days of the week. Some examples of exercise include walking, biking, dancing, and swimming. You can also fit in more physical activity by taking the stairs instead of the elevator or parking farther away from stores. Include muscle strengthening activities 2 days each week. Regular exercise provides many health benefits. It helps you manage your weight, and decreases your risk for type 2 diabetes,  heart disease, stroke, and high blood pressure. Exercise can also help improve your mood. Ask your healthcare provider about the best exercise plan for you.       General health and safety guidelines:   Do not smoke.  Nicotine and other chemicals in cigarettes and cigars can cause lung damage. Ask your healthcare provider for information if you currently smoke and need help to quit. E-cigarettes or smokeless tobacco still contain nicotine. Talk to your healthcare provider before you use these products.    Limit alcohol.  A drink of alcohol is 12 ounces of beer, 5 ounces of wine, or 1½ ounces of liquor.    Lose weight, if needed.  Being overweight increases your risk of certain health conditions. These include heart disease, high blood pressure, type 2 diabetes, and certain types of cancer.    Protect your skin.  Do not sunbathe or use tanning beds. Use sunscreen with a SPF 15 or higher. Apply sunscreen at least 15 minutes before you go outside. Reapply sunscreen every 2 hours. Wear protective clothing, hats, and sunglasses when you are outside.    Drive safely.  Always wear your seatbelt. Make sure everyone in your car wears a seatbelt. A seatbelt can save your life if you are in an accident. Do not use your cell phone when you are driving. This could distract you and cause an accident. Pull over if you need to make a call or send a text message.    Practice safe sex.  Use latex condoms if are sexually active and have more than one partner. Your healthcare provider may recommend screening tests for sexually transmitted infections (STIs).    Wear helmets, lifejackets, and protective gear.  Always wear a helmet when you ride a bike or motorcycle, go skiing, or play sports that could cause a head injury. Wear protective equipment when you play sports. Wear a lifejacket when you are on a boat or doing water sports.    © Copyright [employer] 2023 Information is for End User's use only and may not be sold, redistributed or  otherwise used for commercial purposes.  The above information is an  only. It is not intended as medical advice for individual conditions or treatments. Talk to your doctor, nurse or pharmacist before following any medical regimen to see if it is safe and effective for you.    Obesity   AMBULATORY CARE:   Obesity  means your body mass index (BMI) is greater than 30. Your healthcare provider will use your age, height, and weight to measure your BMI.  The risks of obesity include  many health problems, including injuries or physical disability.  Diabetes (high blood sugar level)    High blood pressure or high cholesterol    Heart disease or heart failure    Stroke    Gallbladder or liver disease    Cancer of the colon, breast, prostate, liver, or kidney    Sleep apnea    Arthritis or gout    Screening  is done to check for health conditions before you have signs or symptoms. If you are 35 to 70 years old, your blood sugar level may be checked every 3 years for signs of prediabetes or diabetes. Your healthcare provider will check your blood pressure at each visit. High blood pressure can lead to a stroke or other problems. Your provider may check for signs of heart disease, cancer, or other health problems.  Seek care immediately if:   You have a severe headache, confusion, or difficulty speaking.    You have weakness on one side of your body.    You have chest pain, sweating, or shortness of breath.    Call your doctor if:   You have symptoms of gallbladder or liver disease, such as pain in your upper abdomen.    You have knee or hip pain and discomfort while walking.    You have symptoms of diabetes, such as intense hunger and thirst, and frequent urination.    You have symptoms of sleep apnea, such as snoring or daytime sleepiness.    You have questions or concerns about your condition or care.    Treatment for obesity  focuses on helping you lose weight to improve your health. Even a small decrease  in BMI can reduce the risk for many health problems. Your healthcare provider will help you set a weight-loss goal.  Lifestyle changes  are the first step in treating obesity. These include making healthy food choices and getting regular physical activity. Your healthcare provider may suggest a weight-loss program that involves coaching, education, and therapy.    Medicine  may help you lose weight when it is used with a healthy foods and physical activity.    Surgery  can help you lose weight if you have obesity along with other health problems. Several types of weight-loss surgery are available. Ask your healthcare provider for more information.    Tips for safe weight loss:   Set small, realistic goals.  An example of a small goal is to walk for 20 minutes 5 days a week. Anther goal is to lose 5% of your body weight.    Ask for support.  Tell friends, family members, and coworkers about your goals. Ask someone to lose weight with you. You may also want to join a weight-loss support group.    Identify foods or triggers that may cause you to overeat.  Remove tempting high-calorie foods from your home and workplace. Place a bowl of fresh fruit on your kitchen counter. If stress causes you to eat, find other ways to cope with stress. A counselor or therapist may be able to help you.    Track your daily calories and activity.  Write down what you eat and drink. Also write down how many minutes of physical activity you do each day.     Track your weekly weight.  Weigh yourself in the morning, before you eat or drink anything but after you use the bathroom. Use the same scale, in the same place, and in similar clothing each time. Only weigh yourself 1 to 2 times each week, or as directed. You may become discouraged if you weigh yourself every day.    Eating changes:  You will need to eat 500 to 1,000 fewer calories each day than you currently eat to lose 1 to 2 pounds a week. The following changes will help you cut  calories:  Eat smaller portions.  Use small plates, no larger than 9 inches in diameter. Fill your plate half full of fruits and vegetables. Measure your food using measuring cups until you know what a serving size looks like.         Eat 3 meals and 1 or 2 snacks each day.  Plan your meals in advance. Cook and eat at home most of the time. Eat slowly. Do not skip meals. Skipping meals can lead to overeating later in the day. This can make it harder for you to lose weight. Talk with a dietitian to help you make a meal plan and schedule that is right for you.    Eat fruits and vegetables at every meal.  They are low in calories and high in fiber, which makes you feel full. Do not add butter, margarine, or cream sauce to vegetables. Use herbs to season steamed vegetables.    Eat less fat and fewer fried foods.  Eat more baked or grilled chicken and fish. These protein sources are lower in calories and fat than red meat. Limit fast food. Dress your salads with olive oil and vinegar instead of bottled dressing.    Limit the amount of sugar you eat.  Do not drink sugary beverages. Limit alcohol.       Activity changes:  Physical activity is good for your body in many ways. It helps you burn calories and build strong muscles. It decreases stress and depression, and improves your mood. It can also help you sleep better. Talk to your healthcare provider before you begin an exercise program.  Exercise for at least 30 minutes 5 days a week.  Start slowly. Set aside time each day for physical activity that you enjoy and that is convenient for you. It is best to do both weight training and an activity that increases your heart rate, such as walking, bicycling, or swimming.            Find ways to be more active.  Do yard work and housecleaning. Walk up the stairs instead of using elevators. Spend your leisure time going to events that require walking, such as outdoor festivals or fairs. This extra physical activity can help you  lose weight and keep it off.       Follow up with your doctor as directed:  You may need to meet with a dietitian. Write down your questions so you remember to ask them during your visits.  © Copyright [employer] 2023 Information is for End User's use only and may not be sold, redistributed or otherwise used for commercial purposes.  The above information is an  only. It is not intended as medical advice for individual conditions or treatments. Talk to your doctor, nurse or pharmacist before following any medical regimen to see if it is safe and effective for you.    Wellness Visit for Adults   AMBULATORY CARE:   A wellness visit  is when you see your healthcare provider to get screened for health problems. Your healthcare provider will also give you advice on how to stay healthy. Write down your questions so you remember to ask them. Ask your healthcare provider how often you should have a wellness visit.  What happens at a wellness visit:  Your healthcare provider will ask about your health, and your family history of health problems. This includes high blood pressure, heart disease, and cancer. He or she will ask if you have symptoms that concern you, if you smoke, and about your mood. You may also be asked about your intake of medicines, supplements, food, and alcohol. Any of the following may be done:  Your weight  will be checked. Your height may also be checked so your body mass index (BMI) can be calculated. Your BMI shows if you are at a healthy weight.    Your blood pressure  and heart rate will be checked. Your temperature may also be checked.    Blood and urine tests  may be done. Blood tests may be done to check your cholesterol levels. Abnormal cholesterol levels increase your risk for heart disease and stroke. You may also need a blood or urine test to check for diabetes if you are at increased risk. Urine tests may be done to look for signs of an infection or kidney disease.    A  physical exam  includes checking your heartbeat and lungs with a stethoscope. Your healthcare provider may also check your skin to look for sun damage.    Screening tests  may be recommended. A screening test is done to check for diseases that may not cause symptoms. The screening tests you may need depend on your age, gender, family history, and lifestyle habits. For example, colorectal screening may be recommended if you are 50 years old or older.    Screening tests you need if you are a woman:   A Pap smear  is used to screen for cervical cancer. Pap smears are usually done every 3 to 5 years depending on your age. You may need them more often if you have had abnormal Pap smear test results in the past. Ask your healthcare provider how often you should have a Pap smear.    A mammogram  is an x-ray of your breasts to screen for breast cancer. Experts recommend mammograms every 2 years starting at age 50 years. You may need a mammogram at age 49 years or younger if you have an increased risk for breast cancer. Talk to your healthcare provider about when you should start having mammograms and how often you need them.    Vaccines you may need:   Get an influenza vaccine  every year. The influenza vaccine protects you from the flu. Several types of viruses cause the flu. The viruses change over time, so new vaccines are made each year.    Get a tetanus-diphtheria (Td) booster vaccine  every 10 years. This vaccine protects you against tetanus and diphtheria. Tetanus is a severe infection that may cause painful muscle spasms and lockjaw. Diphtheria is a severe bacterial infection that causes a thick covering in the back of your mouth and throat.    Get a human papillomavirus (HPV) vaccine  if you are female and aged 19 to 26 or male 19 to 21 and never received it. This vaccine protects you from HPV infection. HPV is the most common infection spread by sexual contact. HPV may also cause vaginal, penile, and anal  cancers.    Get a pneumococcal vaccine  if you are aged 65 years or older. The pneumococcal vaccine is an injection given to protect you from pneumococcal disease. Pneumococcal disease is an infection caused by pneumococcal bacteria. The infection may cause pneumonia, meningitis, or an ear infection.    Get a shingles vaccine  if you are 60 or older, even if you have had shingles before. The shingles vaccine is an injection to protect you from the varicella-zoster virus. This is the same virus that causes chickenpox. Shingles is a painful rash that develops in people who had chickenpox or have been exposed to the virus.    How to eat healthy:  My Plate is a model for planning healthy meals. It shows the types and amounts of foods that should go on your plate. Fruits and vegetables make up about half of your plate, and grains and protein make up the other half. A serving of dairy is included on the side of your plate. The amount of calories and serving sizes you need depends on your age, gender, weight, and height. Examples of healthy foods are listed below:  Eat a variety of vegetables  such as dark green, red, and orange vegetables. You can also include canned vegetables low in sodium (salt) and frozen vegetables without added butter or sauces.    Eat a variety of fresh fruits , canned fruit in 100% juice, frozen fruit, and dried fruit.    Include whole grains.  At least half of the grains you eat should be whole grains. Examples include whole-wheat bread, wheat pasta, brown rice, and whole-grain cereals such as oatmeal.    Eat a variety of protein foods such as seafood (fish and shellfish), lean meat, and poultry without skin (turkey and chicken). Examples of lean meats include pork leg, shoulder, or tenderloin, and beef round, sirloin, tenderloin, and extra lean ground beef. Other protein foods include eggs and egg substitutes, beans, peas, soy products, nuts, and seeds.    Choose low-fat dairy products such as  skim or 1% milk or low-fat yogurt, cheese, and cottage cheese.    Limit unhealthy fats  such as butter, hard margarine, and shortening.       Exercise:  Exercise at least 30 minutes per day on most days of the week. Some examples of exercise include walking, biking, dancing, and swimming. You can also fit in more physical activity by taking the stairs instead of the elevator or parking farther away from stores. Include muscle strengthening activities 2 days each week. Regular exercise provides many health benefits. It helps you manage your weight, and decreases your risk for type 2 diabetes, heart disease, stroke, and high blood pressure. Exercise can also help improve your mood. Ask your healthcare provider about the best exercise plan for you.       General health and safety guidelines:   Do not smoke.  Nicotine and other chemicals in cigarettes and cigars can cause lung damage. Ask your healthcare provider for information if you currently smoke and need help to quit. E-cigarettes or smokeless tobacco still contain nicotine. Talk to your healthcare provider before you use these products.    Limit alcohol.  A drink of alcohol is 12 ounces of beer, 5 ounces of wine, or 1½ ounces of liquor.    Lose weight, if needed.  Being overweight increases your risk of certain health conditions. These include heart disease, high blood pressure, type 2 diabetes, and certain types of cancer.    Protect your skin.  Do not sunbathe or use tanning beds. Use sunscreen with a SPF 15 or higher. Apply sunscreen at least 15 minutes before you go outside. Reapply sunscreen every 2 hours. Wear protective clothing, hats, and sunglasses when you are outside.    Drive safely.  Always wear your seatbelt. Make sure everyone in your car wears a seatbelt. A seatbelt can save your life if you are in an accident. Do not use your cell phone when you are driving. This could distract you and cause an accident. Pull over if you need to make a call or  send a text message.    Practice safe sex.  Use latex condoms if are sexually active and have more than one partner. Your healthcare provider may recommend screening tests for sexually transmitted infections (STIs).    Wear helmets, lifejackets, and protective gear.  Always wear a helmet when you ride a bike or motorcycle, go skiing, or play sports that could cause a head injury. Wear protective equipment when you play sports. Wear a lifejacket when you are on a boat or doing water sports.    © Copyright [employer] 2023 Information is for End User's use only and may not be sold, redistributed or otherwise used for commercial purposes.  The above information is an  only. It is not intended as medical advice for individual conditions or treatments. Talk to your doctor, nurse or pharmacist before following any medical regimen to see if it is safe and effective for you.

## 2024-05-21 NOTE — PROGRESS NOTES
Adult Annual Physical  Name: Sena Adhikari      : 1960      MRN: [id number]  Encounter Provider: Bianka Caldera MD  Encounter Date: 2024   Encounter department: [facility]    Assessment & Plan   1. Annual physical exam  Assessment & Plan:  Colon Cancer screening- Pt agrees to Cologaurd and does not want Colonoscopy  PSA screening ordered today  Nonsmoker  DM Eye exam complete  Zoster # 2 today    2. Type 2 diabetes mellitus without complication, without long-term current use of insulin (HCC)  Assessment & Plan:    Lab Results   Component Value Date    HGBA1C 7.0 (A) 2024     Stable 6.9 to 7.0  - Current medications:  Metformin 1000 mg bid, Jardiance 25 mg daily  - Diet: eating 2 meals a day-breakfast and lunch, snacking often  - +Htn: no Htn, not on an ACE  - +Hld: taking Atorvastatin 40 mg  - Urine microalb: 2022- normal  - Ophthalmology:   - DM Foot exam: completed   - Dentist: regular  - Pneumo vaccine:   - Smoker: no  - Keep log of blood glucose readings  - Encouraged: Diabetic Diet, Regular exercise, Weight loss  Orders:  -     POCT hemoglobin A1c  3. Mixed hyperlipidemia  -     Lipid Panel with Direct LDL reflex; Future; Expected date: 2024  -     atorvastatin (LIPITOR) 40 mg tablet; Take 1 tablet (40 mg total) by mouth daily  4. Chronic left shoulder pain  -     naproxen (Naprosyn) 500 mg tablet; Take 1 tablet (500 mg total) by mouth daily at bedtime  5. Encounter for immunization  -     Zoster Vaccine Recombinant IM  6. Screen for colon cancer  -     Cologuard  7. Screening for prostate cancer  -     PSA, Total Screen; Future    Immunizations and preventive care screenings were discussed with patient today. Appropriate education was printed on patient's after visit summary.        Counseling:  Alcohol/drug use: discussed moderation in alcohol intake, the recommendations for healthy alcohol use, and avoidance of illicit drug  use.  Dental Health: discussed importance of regular tooth brushing, flossing, and dental visits.  Injury prevention: discussed safety/seat belts, safety helmets, smoke detectors, carbon dioxide detectors, and smoking near bedding or upholstery.  Sexual health: discussed sexually transmitted diseases  Exercise: the importance of regular exercise/physical activity was discussed. Recommend exercise 3-5 times per week for at least 30 minutes.          History of Present Illness       Sena Adhikari is a 63 y.o. male  has a past medical history of Diabetes mellitus (HCC). who presented to the office today for Annual Physical.  Pt presents with his son, Ludwig.    Pt states that he has been eating a low carb diet, and has lost about 10 pounds I th past 3 months.    The following portions of the patient's history were reviewed and updated as appropriate: allergies, current medications, past family history, past medical history, past social history, past surgical history and problem list.    Adult Annual Physical:  Patient presents for annual physical.     Diet and Physical Activity:  - Diet/Nutrition: well balanced diet, diabetic diet, consuming 3-5 servings of fruits/vegetables daily and low calorie diet.  - Exercise: walking and 3-4 times a week on average. [employer]-goes in the [employer]    General Health:  - Sleep: sleeps poorly. Disturbed sleep due Left shoulder pain  - Hearing: normal hearing right ear and normal hearing left ear.  - Vision: no vision problems and most recent eye exam < 1 year ago. Last visit with [facility]-Pt reports was told has no vision problems  - Dental: regular dental visits and brushes teeth twice daily. Dentures are being prepared now     Health:  - History of STDs: no.   - Urinary symptoms: urinary frequency.   He notices when he eats more sugar, the urine burns sometimes, and then resolves when he eats less sugars.    Review of Systems   Constitutional:  Negative for chills and  fever.   HENT:  Negative for congestion, rhinorrhea and sore throat.    Respiratory:  Negative for cough and shortness of breath.    Cardiovascular:  Negative for chest pain.   Gastrointestinal:  Negative for diarrhea, nausea and vomiting.   Skin:  Negative for rash.   Allergic/Immunologic: Positive for environmental allergies.   Neurological:  Negative for dizziness and headaches.       Current Outpatient Medications on File Prior to Visit   Medication Sig Dispense Refill   • Blood Glucose Monitoring Suppl (OneTouch Verio) w/Device KIT Use 2 (two) times a day 1 kit 0   • cetirizine (ZyrTEC) 10 MG chewable tablet Chew 1 tablet (10 mg total) daily 30 tablet 2   • cetirizine (ZyrTEC) 10 mg tablet Take 1 tablet (10 mg total) by mouth daily 30 tablet 5   • Diclofenac Sodium (VOLTAREN) 1 % Apply 2 g topically 4 (four) times a day 150 g 1   • Empagliflozin (Jardiance) 25 MG TABS Take 1 tablet (25 mg total) by mouth daily 90 tablet 1   • glucose blood (OneTouch Verio) test strip Use as instructed 100 strip 2   • Lancets (onetouch ultrasoft) lancets Use as instructed 100 each 2   • metFORMIN (GLUCOPHAGE) 1000 MG tablet Take 1 tablet (1,000 mg total) by mouth 2 (two) times a day 180 tablet 5   • Multiple Vitamins-Minerals (multivitamin with minerals) tablet TAKE ONE TABLET BY MOUTH EVERY DAY 90 tablet 1   • Multiple Vitamins-Minerals (Ocuvite-Lutein) CAPS Take 1 capsule by mouth daily 90 capsule 1   • sildenafil (VIAGRA) 25 MG tablet Take 1 tablet (25 mg total) by mouth daily as needed for erectile dysfunction 10 tablet 0   • tamsulosin (FLOMAX) 0.4 mg TAKE ONE CAPSULE BY MOUTH EVERY DAY WITH DINNER 30 capsule 11   • triamcinolone (KENALOG) 0.1 % cream Apply topically 2 (two) times a day (Patient not taking: Reported on 3/11/2024) 30 g 1   • [DISCONTINUED] atorvastatin (LIPITOR) 40 mg tablet Take 1 tablet (40 mg total) by mouth daily 90 tablet 1   • [DISCONTINUED] naproxen (Naprosyn) 500 mg tablet Take 1 tablet (500 mg  "total) by mouth 2 (two) times a day with meals 60 tablet 1   • [DISCONTINUED] nystatin (MYCOSTATIN) cream Apply topically 2 (two) times a day 30 g 0     No current facility-administered medications on file prior to visit.        Objective     /73 (BP Location: Left arm, Patient Position: Sitting, Cuff Size: Standard)   Pulse 85   Temp (!) 97.1 °F (36.2 °C) (Temporal)   Resp 14   Ht 5' 4\" (1.626 m)   Wt 85.4 kg (188 lb 3.2 oz)   SpO2 96%   BMI 32.30 kg/m²     Physical Exam  Vitals and nursing note reviewed.   Constitutional:       General: He is not in acute distress.     Appearance: He is well-developed.   HENT:      Head: Normocephalic and atraumatic.      Right Ear: Tympanic membrane, ear canal and external ear normal.      Left Ear: Tympanic membrane, ear canal and external ear normal.      Mouth/Throat:      Mouth: Mucous membranes are moist.   Eyes:      Extraocular Movements: Extraocular movements intact.      Conjunctiva/sclera: Conjunctivae normal.   Cardiovascular:      Rate and Rhythm: Normal rate and regular rhythm.      Heart sounds: Normal heart sounds. No murmur heard.  Pulmonary:      Effort: Pulmonary effort is normal. No respiratory distress.      Breath sounds: Normal breath sounds.   Abdominal:      Palpations: Abdomen is soft.      Tenderness: There is no abdominal tenderness.   Musculoskeletal:         General: No swelling.      Cervical back: Neck supple.      Right lower leg: No edema.      Left lower leg: No edema.   Skin:     General: Skin is warm and dry.      Capillary Refill: Capillary refill takes less than 2 seconds.   Neurological:      General: No focal deficit present.      Mental Status: He is alert and oriented to person, place, and time.   Psychiatric:         Mood and Affect: Mood normal.         Behavior: Behavior normal.       Administrative Statements         "

## 2024-05-21 NOTE — PROGRESS NOTES
Adult Annual Physical  Name: Sena Adhikari      : 1960      MRN: [id number]  Encounter Provider: Bianka Caldera MD  Encounter Date: 2024   Encounter department: [facility]    Assessment & Plan   1. Annual physical exam  Assessment & Plan:  Colon Cancer screening- Pt agrees to Cologaurd and does not want Colonoscopy  PSA screening ordered today  Nonsmoker  DM Eye exam complete  Zoster # 2 today  2. Type 2 diabetes mellitus without complication, without long-term current use of insulin (HCC)  Assessment & Plan:    Lab Results   Component Value Date    HGBA1C 7.0 (A) 2024     Stable 6.9 to 7.0  - Current medications:  Metformin 1000 mg bid, Jardiance 25 mg daily  - Diet: eating 2 meals a day-breakfast and lunch, snacking often  - +Htn: no Htn, not on an ACE  - +Hld: taking Atorvastatin 40 mg  - Urine microalb: 2022- normal  - Ophthalmology:   - DM Foot exam: completed   - Dentist: regular  - Pneumo vaccine:   - Smoker: no  - Keep log of blood glucose readings  - Encouraged: Diabetic Diet, Regular exercise, Weight loss  Orders:  -     POCT hemoglobin A1c  3. Mixed hyperlipidemia  -     Lipid Panel with Direct LDL reflex; Future; Expected date: 2024  -     atorvastatin (LIPITOR) 40 mg tablet; Take 1 tablet (40 mg total) by mouth daily  4. Chronic left shoulder pain  -     naproxen (Naprosyn) 500 mg tablet; Take 1 tablet (500 mg total) by mouth daily at bedtime  5. Encounter for immunization  -     Zoster Vaccine Recombinant IM  6. Screen for colon cancer  -     Cologuard  7. Screening for prostate cancer  -     PSA, Total Screen; Future    Immunizations and preventive care screenings were discussed with patient today. Appropriate education was printed on patient's after visit summary.    {Prostate cancer screening - consider in males between age of 40-75 depending on age, race, and risk factors. There is difference in the guidelines in regards to  "the optimal age for screening.  USPSTF states to consider periodic screening in males between the age of 50 to 69. This text is informational and does not need to be selected but if you wish, you can insert standard documentation in your progress note by using F2 (Optional):10380}    Counseling:  {Annual Physical; Counselin}         History of Present Illness   {Disappearing Hyperlinks I Encounters * My Last Note * Since Last Visit * History :98380}  Adult Annual Physical  Review of Systems  {Select to Display PMH (Optional):54850}    Objective   {Disappearing Hyperlinks   Review Vitals * Enter New Vitals * Results Review * Labs * Imaging * Cardiology * Procedures * Lung Cancer Screening :28786}  /73 (BP Location: Left arm, Patient Position: Sitting, Cuff Size: Standard)   Pulse 85   Temp (!) 97.1 °F (36.2 °C) (Temporal)   Resp 14   Ht 5' 4\" (1.626 m)   Wt 85.4 kg (188 lb 3.2 oz)   SpO2 96%   BMI 32.30 kg/m²     Physical Exam  Administrative Statements {Disappearing Hyperlinks I  Level of Service * PCMH/PCSP:72184}  {Time Spent Statement (Optional):90186}      "

## 2024-06-12 ENCOUNTER — OFFICE VISIT (OUTPATIENT)
Dept: DENTISTRY | Facility: CLINIC | Age: 64
End: 2024-06-12

## 2024-06-12 VITALS — HEART RATE: 77 BPM | DIASTOLIC BLOOD PRESSURE: 68 MMHG | SYSTOLIC BLOOD PRESSURE: 102 MMHG | TEMPERATURE: 98.1 F

## 2024-06-12 DIAGNOSIS — K08.109 COMPLETE EDENTULISM, UNSPECIFIED EDENTULISM CLASS: Primary | ICD-10-CM

## 2024-06-12 PROCEDURE — WIS5002 BITE RIMS

## 2024-06-12 NOTE — DENTAL PROCEDURE DETAILS
Reviewed health history-  Pt is ASA type II  Treatment consents signed: Yes   Perio: Non applicable   Pain Scale: 0   Caries Assessment: Non applicable     Radiographs: Films are current  Oral Hygiene instruction reviewed  Annual evaluation of soft tissue recommended    Dental Procedure:    Pt presents for max and carrol wax rims. PMH reviewed, no changes. Wax rims tried intraorally. Adjusted maxillary rim to garcia plane, proper esthetics, phonetics, and tissue support. Adjusted mandibular rim to maxillary rim at VDO with proper tissue support. Marked midline, high smile line, and distal of canine lines. Notched rims and obtained bite registration with Aluwax.  shade will  Be A3 and confirmed by pt via mirror. Pt left satisfied and ambulatory.     Prognosis is Good.   Referrals Needed: No  Next visit: wax try in

## 2024-07-23 ENCOUNTER — TELEPHONE (OUTPATIENT)
Dept: FAMILY MEDICINE CLINIC | Facility: CLINIC | Age: 64
End: 2024-07-23

## 2024-07-23 NOTE — TELEPHONE ENCOUNTER
first attempt to contact patient. left message to return my call on answering machine, appointment was rescheduled from 2pm to 1120am, if that's ok no need for a call back if not good please call us back and reschedule appointment.

## 2024-07-30 ENCOUNTER — OFFICE VISIT (OUTPATIENT)
Dept: DENTISTRY | Facility: CLINIC | Age: 64
End: 2024-07-30

## 2024-07-30 VITALS — DIASTOLIC BLOOD PRESSURE: 63 MMHG | SYSTOLIC BLOOD PRESSURE: 95 MMHG | TEMPERATURE: 98.2 F | HEART RATE: 78 BPM

## 2024-07-30 DIAGNOSIS — K08.109 COMPLETE EDENTULISM, UNSPECIFIED EDENTULISM CLASS: Primary | ICD-10-CM

## 2024-07-30 PROCEDURE — WIS5003 WAX TRY-IN

## 2024-07-30 PROCEDURE — D5899 UNSPECIFIED REMOVABLE PROSTHODONTIC PROCEDURE, BY REPORT: HCPCS

## 2024-07-30 NOTE — PROGRESS NOTES
Pt presents for wax try in . PMH reviewed, no changes. Tooth set up tried intraorally. Confirmed proper tissue support and occlusion. Pt confirmed satisfaction with function and esthetics via pt mirror. Selected gingival shade L-199 LRP and confirmed by pt.     Tray adhesive applied to lower baseplate and dried. Light body wash performed. Contours, vestibule and intaglio captured.     Pt left satisfied and ambulatory.     NV: delivery

## 2024-08-17 ENCOUNTER — APPOINTMENT (OUTPATIENT)
Dept: LAB | Age: 64
End: 2024-08-17
Payer: MEDICARE

## 2024-08-17 DIAGNOSIS — E78.2 MIXED HYPERLIPIDEMIA: ICD-10-CM

## 2024-08-17 DIAGNOSIS — E11.9 TYPE 2 DIABETES MELLITUS WITHOUT COMPLICATION, WITHOUT LONG-TERM CURRENT USE OF INSULIN (HCC): ICD-10-CM

## 2024-08-17 DIAGNOSIS — Z12.5 SCREENING FOR PROSTATE CANCER: ICD-10-CM

## 2024-08-17 LAB
ALBUMIN SERPL BCG-MCNC: 4.2 G/DL (ref 3.5–5)
ALP SERPL-CCNC: 48 U/L (ref 34–104)
ALT SERPL W P-5'-P-CCNC: 28 U/L (ref 7–52)
ANION GAP SERPL CALCULATED.3IONS-SCNC: 9 MMOL/L (ref 4–13)
AST SERPL W P-5'-P-CCNC: 23 U/L (ref 13–39)
BILIRUB SERPL-MCNC: 0.7 MG/DL (ref 0.2–1)
BUN SERPL-MCNC: 17 MG/DL (ref 5–25)
CALCIUM SERPL-MCNC: 9.5 MG/DL (ref 8.4–10.2)
CHLORIDE SERPL-SCNC: 106 MMOL/L (ref 96–108)
CHOLEST SERPL-MCNC: 135 MG/DL
CO2 SERPL-SCNC: 27 MMOL/L (ref 21–32)
CREAT SERPL-MCNC: 0.93 MG/DL (ref 0.6–1.3)
CREAT UR-MCNC: 87.1 MG/DL
EST. AVERAGE GLUCOSE BLD GHB EST-MCNC: 154 MG/DL
GFR SERPL CREATININE-BSD FRML MDRD: 87 ML/MIN/1.73SQ M
GLUCOSE P FAST SERPL-MCNC: 84 MG/DL (ref 65–99)
HBA1C MFR BLD: 7 %
HDLC SERPL-MCNC: 43 MG/DL
LDLC SERPL CALC-MCNC: 60 MG/DL (ref 0–100)
MICROALBUMIN UR-MCNC: <7 MG/L
POTASSIUM SERPL-SCNC: 4.1 MMOL/L (ref 3.5–5.3)
PROT SERPL-MCNC: 7.8 G/DL (ref 6.4–8.4)
PSA SERPL-MCNC: 1.3 NG/ML (ref 0–4)
SODIUM SERPL-SCNC: 142 MMOL/L (ref 135–147)
TRIGL SERPL-MCNC: 162 MG/DL

## 2024-08-17 PROCEDURE — 82043 UR ALBUMIN QUANTITATIVE: CPT

## 2024-08-17 PROCEDURE — 80053 COMPREHEN METABOLIC PANEL: CPT

## 2024-08-17 PROCEDURE — 36415 COLL VENOUS BLD VENIPUNCTURE: CPT

## 2024-08-17 PROCEDURE — 83036 HEMOGLOBIN GLYCOSYLATED A1C: CPT

## 2024-08-17 PROCEDURE — 80061 LIPID PANEL: CPT

## 2024-08-17 PROCEDURE — G0103 PSA SCREENING: HCPCS

## 2024-08-17 PROCEDURE — 82570 ASSAY OF URINE CREATININE: CPT

## 2024-08-23 ENCOUNTER — OFFICE VISIT (OUTPATIENT)
Dept: FAMILY MEDICINE CLINIC | Facility: CLINIC | Age: 64
End: 2024-08-23

## 2024-08-23 VITALS
WEIGHT: 194 LBS | OXYGEN SATURATION: 99 % | TEMPERATURE: 98 F | SYSTOLIC BLOOD PRESSURE: 100 MMHG | HEART RATE: 73 BPM | DIASTOLIC BLOOD PRESSURE: 64 MMHG | RESPIRATION RATE: 16 BRPM | BODY MASS INDEX: 33.12 KG/M2 | HEIGHT: 64 IN

## 2024-08-23 DIAGNOSIS — E78.2 MIXED HYPERLIPIDEMIA: ICD-10-CM

## 2024-08-23 DIAGNOSIS — J30.2 SEASONAL ALLERGIES: ICD-10-CM

## 2024-08-23 DIAGNOSIS — L50.8 CHRONIC URTICARIA: ICD-10-CM

## 2024-08-23 DIAGNOSIS — Z12.12 SCREENING FOR COLORECTAL CANCER: ICD-10-CM

## 2024-08-23 DIAGNOSIS — M19.012 ARTHRITIS OF LEFT ACROMIOCLAVICULAR JOINT: ICD-10-CM

## 2024-08-23 DIAGNOSIS — E11.9 TYPE 2 DIABETES MELLITUS WITHOUT COMPLICATION, WITHOUT LONG-TERM CURRENT USE OF INSULIN (HCC): Primary | ICD-10-CM

## 2024-08-23 DIAGNOSIS — G89.29 CHRONIC LEFT SHOULDER PAIN: ICD-10-CM

## 2024-08-23 DIAGNOSIS — N18.2 CKD (CHRONIC KIDNEY DISEASE) STAGE 2, GFR 60-89 ML/MIN: ICD-10-CM

## 2024-08-23 DIAGNOSIS — Z12.11 SCREENING FOR COLORECTAL CANCER: ICD-10-CM

## 2024-08-23 DIAGNOSIS — M25.512 CHRONIC LEFT SHOULDER PAIN: ICD-10-CM

## 2024-08-23 PROBLEM — L50.5 CHOLINERGIC URTICARIA: Status: RESOLVED | Noted: 2021-03-23 | Resolved: 2024-08-23

## 2024-08-23 PROBLEM — Z00.00 ANNUAL PHYSICAL EXAM: Status: RESOLVED | Noted: 2024-05-21 | Resolved: 2024-08-23

## 2024-08-23 PROCEDURE — 99214 OFFICE O/P EST MOD 30 MIN: CPT | Performed by: FAMILY MEDICINE

## 2024-08-23 RX ORDER — LIDOCAINE 40 MG/G
CREAM TOPICAL DAILY
Qty: 28 G | Refills: 0 | Status: SHIPPED | OUTPATIENT
Start: 2024-08-23

## 2024-08-23 RX ORDER — CETIRIZINE HYDROCHLORIDE 10 MG/1
10 TABLET ORAL DAILY
Qty: 30 TABLET | Refills: 5 | Status: SHIPPED | OUTPATIENT
Start: 2024-08-23

## 2024-08-23 RX ORDER — ATORVASTATIN CALCIUM 40 MG/1
40 TABLET, FILM COATED ORAL DAILY
Qty: 90 TABLET | Refills: 1 | Status: SHIPPED | OUTPATIENT
Start: 2024-08-23

## 2024-08-23 NOTE — ASSESSMENT & PLAN NOTE
Chronic left shoulder pain, now with right shoulder pain as well  S/p ICS left shoulder 3/2024  Will refer back to Ortho

## 2024-08-23 NOTE — PROGRESS NOTES
Name: Sena Adhikari      : 1960      MRN: [id number]  Encounter Provider: Bianka Caldera MD  Encounter Date: 2024   Encounter department: [facility]    Assessment & Plan     1. Type 2 diabetes mellitus without complication, without long-term current use of insulin (HCC)  Assessment & Plan:    Lab Results   Component Value Date    HGBA1C 7.0 (H) 2024     Stable 6.9 to 7.0  - Current medications:  Metformin 1000 mg bid, Jardiance 25 mg daily  - Diet: eating 2 meals a day-breakfast and lunch, snacking often  - +Htn: no Htn, not on an ACE  - +Hld: taking Atorvastatin 40 mg  - Urine microalb: 2022- normal  - Ophthalmology:   - DM Foot exam: completed   - Dentist: regular  - Pneumo vaccine:   - Smoker: no  - Keep log of blood glucose readings  - Encouraged: Diabetic Diet, Regular exercise, Weight loss  Orders:  -     IRIS Diabetic eye exam  -     metFORMIN (GLUCOPHAGE) 1000 MG tablet; Take 1 tablet (1,000 mg total) by mouth 2 (two) times a day  -     Empagliflozin (Jardiance) 25 MG TABS; Take 1 tablet (25 mg total) by mouth daily  2. Arthritis of left acromioclavicular joint  Assessment & Plan:  Chronic left shoulder pain, now with right shoulder pain as well  S/p ICS left shoulder 3/2024  Will refer back to Ortho   Orders:  -     lidocaine (LMX) 4 % cream; Apply topically daily  3. Chronic urticaria  -     cetirizine (ZyrTEC) 10 mg tablet; Take 1 tablet (10 mg total) by mouth daily  4. Seasonal allergies  -     cetirizine (ZyrTEC) 10 mg tablet; Take 1 tablet (10 mg total) by mouth daily  5. Mixed hyperlipidemia  Assessment & Plan:  Lab Results   Component Value Date/Time    CHOLESTEROL 135 2024 10:00 AM    TRIG 162 (H) 2024 10:00 AM    TRIG 217 2014 10:00 AM    HDL 43 2024 10:00 AM    HDL 33 2014 10:00 AM    LDLCALC 60 2024 10:00 AM    LDLCALC 131 2014 10:00 AM     The 10-year ASCVD risk score: 11%  Continue  Atorvastatin 40 mg daily  Low Fat Diet, regular Exercise    Orders:  -     atorvastatin (LIPITOR) 40 mg tablet; Take 1 tablet (40 mg total) by mouth daily  6. Chronic left shoulder pain  Assessment & Plan:  Chronic left shoulder pain, now with right shoulder pain as well  S/p ICS left shoulder 3/2024  Will refer back to Ortho     7. Screening for colorectal cancer  -     Max  8. CKD (chronic kidney disease) stage 2, GFR 60-89 ml/min  Assessment & Plan:  Lab Results   Component Value Date    EGFR 87 08/17/2024    EGFR 80 05/25/2023    EGFR 84 02/03/2023    CREATININE 0.93 08/17/2024    CREATININE 1.00 05/25/2023    CREATININE 0.96 02/03/2023     Declines colonoscopy    BMI Counseling: Body mass index is 33.3 kg/m². The BMI is above normal. Nutrition recommendations include decreasing portion sizes, encouraging healthy choices of fruits and vegetables, decreasing fast food intake, consuming healthier snacks, limiting drinks that contain sugar and moderation in carbohydrate intake. Exercise recommendations include moderate physical activity 150 minutes/week. Rationale for BMI follow-up plan is due to patient being overweight or obese.         Subjective      ANDREA Adhikari is a 63 y.o. male  has a past medical history of Diabetes mellitus (HCC). who presented to the office today to follow up for Diabetes.  Patient states that he has been eating more often, he feels like his mouth is dry and constantly feels like he needs to eat something.  He has been checking his fingersticks but often it shows a result of ER 4.  Goes for walks 2-3 times a week and also swims about 2 times a week    C/o of continued left shoulder pain, now also has right shoulder pain.  He thinks it may be his sleeping position.  Had a left steroid injection about 5 months ago.      The following portions of the patient's history were reviewed and updated as appropriate: allergies, current medications, past family history, past medical history,  past social history, past surgical history and problem list.    Review of Systems   Constitutional:  Negative for chills and fever.   HENT:  Negative for congestion, rhinorrhea and sore throat.    Respiratory:  Negative for cough and shortness of breath.    Cardiovascular:  Negative for chest pain.   Gastrointestinal:  Negative for diarrhea, nausea and vomiting.   Skin:  Negative for rash.   Allergic/Immunologic: Positive for environmental allergies.   Neurological:  Negative for dizziness and headaches.       Current Outpatient Medications on File Prior to Visit   Medication Sig   • Blood Glucose Monitoring Suppl (OneTouch Verio) w/Device KIT Use 2 (two) times a day   • Diclofenac Sodium (VOLTAREN) 1 % Apply 2 g topically 4 (four) times a day   • glucose blood (OneTouch Verio) test strip Use as instructed   • Lancets (onetouch ultrasoft) lancets Use as instructed   • Multiple Vitamins-Minerals (multivitamin with minerals) tablet TAKE ONE TABLET BY MOUTH EVERY DAY   • Multiple Vitamins-Minerals (Ocuvite-Lutein) CAPS Take 1 capsule by mouth daily   • sildenafil (VIAGRA) 25 MG tablet Take 1 tablet (25 mg total) by mouth daily as needed for erectile dysfunction   • tamsulosin (FLOMAX) 0.4 mg TAKE ONE CAPSULE BY MOUTH EVERY DAY WITH DINNER   • triamcinolone (KENALOG) 0.1 % cream Apply topically 2 (two) times a day (Patient not taking: Reported on 3/11/2024)   • [DISCONTINUED] atorvastatin (LIPITOR) 40 mg tablet Take 1 tablet (40 mg total) by mouth daily   • [DISCONTINUED] cetirizine (ZyrTEC) 10 MG chewable tablet Chew 1 tablet (10 mg total) daily   • [DISCONTINUED] cetirizine (ZyrTEC) 10 mg tablet Take 1 tablet (10 mg total) by mouth daily   • [DISCONTINUED] Empagliflozin (Jardiance) 25 MG TABS Take 1 tablet (25 mg total) by mouth daily   • [DISCONTINUED] metFORMIN (GLUCOPHAGE) 1000 MG tablet Take 1 tablet (1,000 mg total) by mouth 2 (two) times a day   • [DISCONTINUED] naproxen (Naprosyn) 500 mg tablet Take 1 tablet  "(500 mg total) by mouth daily at bedtime   • [DISCONTINUED] nystatin (MYCOSTATIN) cream Apply topically 2 (two) times a day       Objective     /64 (BP Location: Right arm, Patient Position: Sitting, Cuff Size: Standard)   Pulse 73   Temp 98 °F (36.7 °C) (Temporal)   Resp 16   Ht 5' 4\" (1.626 m)   Wt 88 kg (194 lb)   SpO2 99%   BMI 33.30 kg/m²     Physical Exam  Vitals and nursing note reviewed.   Constitutional:       Appearance: He is well-developed.   HENT:      Head: Normocephalic and atraumatic.      Right Ear: External ear normal.      Left Ear: External ear normal.   Cardiovascular:      Rate and Rhythm: Normal rate and regular rhythm.      Pulses: no weak pulses.           Dorsalis pedis pulses are 2+ on the right side and 2+ on the left side.      Heart sounds: Normal heart sounds. No murmur heard.  Pulmonary:      Effort: Pulmonary effort is normal. No respiratory distress.      Breath sounds: Normal breath sounds.   Abdominal:      General: Bowel sounds are normal. There is no distension.      Palpations: Abdomen is soft.   Musculoskeletal:      Right shoulder: Tenderness present. Normal range of motion.      Left shoulder: Tenderness present. Normal range of motion.      Right lower leg: No edema.      Left lower leg: No edema.      Comments: + left anterior shoulder tenderness AC joint  FROM   Feet:      Right foot:      Skin integrity: No ulcer, skin breakdown, erythema, warmth, callus or dry skin.      Left foot:      Skin integrity: No ulcer, skin breakdown, erythema, warmth, callus or dry skin.   Neurological:      Mental Status: He is alert and oriented to person, place, and time.   Psychiatric:         Mood and Affect: Mood normal.     Patient's shoes and socks removed.    Right Foot/Ankle   Right Foot Inspection  Skin Exam: skin normal and skin intact. No dry skin, no warmth, no callus, no erythema, no maceration, no abnormal color, no pre-ulcer, no ulcer and no callus.     Toe Exam: " ROM and strength within normal limits.     Sensory   Proprioception: intact  Monofilament testing: intact    Vascular  The right DP pulse is 2+.     Left Foot/Ankle  Left Foot Inspection  Skin Exam: skin normal and skin intact. No dry skin, no warmth, no erythema, no maceration, normal color, no pre-ulcer, no ulcer and no callus.     Toe Exam: ROM and strength within normal limits.     Sensory   Proprioception: intact  Monofilament testing: intact    Vascular  The left DP pulse is 2+.     Assign Risk Category  No deformity present  No loss of protective sensation  No weak pulses  Risk: 0      Bianka Caldera MD

## 2024-08-23 NOTE — ASSESSMENT & PLAN NOTE
Lab Results   Component Value Date/Time    CHOLESTEROL 135 08/17/2024 10:00 AM    TRIG 162 (H) 08/17/2024 10:00 AM    TRIG 217 01/14/2014 10:00 AM    HDL 43 08/17/2024 10:00 AM    HDL 33 01/14/2014 10:00 AM    LDLCALC 60 08/17/2024 10:00 AM    LDLCALC 131 01/14/2014 10:00 AM     The 10-year ASCVD risk score: 11%  Continue Atorvastatin 40 mg daily  Low Fat Diet, regular Exercise

## 2024-08-23 NOTE — ASSESSMENT & PLAN NOTE
Lab Results   Component Value Date    EGFR 87 08/17/2024    EGFR 80 05/25/2023    EGFR 84 02/03/2023    CREATININE 0.93 08/17/2024    CREATININE 1.00 05/25/2023    CREATININE 0.96 02/03/2023

## 2024-08-23 NOTE — ASSESSMENT & PLAN NOTE
Lab Results   Component Value Date    HGBA1C 7.0 (H) 08/17/2024     Stable 6.9 to 7.0  - Current medications:  Metformin 1000 mg bid, Jardiance 25 mg daily  - Diet: eating 2 meals a day-breakfast and lunch, snacking often  - +Htn: no Htn, not on an ACE  - +Hld: taking Atorvastatin 40 mg  - Urine microalb: 7/2022- normal  - Ophthalmology: 2023  - DM Foot exam: completed 2023  - Dentist: regular  - Pneumo vaccine: 2023  - Smoker: no  - Keep log of blood glucose readings  - Encouraged: Diabetic Diet, Regular exercise, Weight loss

## 2024-08-25 DIAGNOSIS — E11.9 TYPE 2 DIABETES MELLITUS WITHOUT COMPLICATION, WITHOUT LONG-TERM CURRENT USE OF INSULIN (HCC): ICD-10-CM

## 2024-08-26 RX ORDER — MULTIVIT-MIN/IRON FUM/FOLIC AC 7.5 MG-4
1 TABLET ORAL DAILY
Qty: 90 TABLET | Refills: 1 | Status: SHIPPED | OUTPATIENT
Start: 2024-08-26

## 2024-09-11 ENCOUNTER — OFFICE VISIT (OUTPATIENT)
Dept: OBGYN CLINIC | Facility: MEDICAL CENTER | Age: 64
End: 2024-09-11
Payer: MEDICARE

## 2024-09-11 VITALS
DIASTOLIC BLOOD PRESSURE: 74 MMHG | BODY MASS INDEX: 33.12 KG/M2 | HEIGHT: 64 IN | WEIGHT: 194 LBS | HEART RATE: 76 BPM | SYSTOLIC BLOOD PRESSURE: 118 MMHG

## 2024-09-11 DIAGNOSIS — G89.29 CHRONIC LEFT SHOULDER PAIN: Primary | ICD-10-CM

## 2024-09-11 DIAGNOSIS — M75.82 TENDINITIS OF LEFT ROTATOR CUFF: ICD-10-CM

## 2024-09-11 DIAGNOSIS — M25.512 CHRONIC LEFT SHOULDER PAIN: Primary | ICD-10-CM

## 2024-09-11 PROCEDURE — 99213 OFFICE O/P EST LOW 20 MIN: CPT | Performed by: EMERGENCY MEDICINE

## 2024-09-11 NOTE — PROGRESS NOTES
Assessment/Plan:    Diagnoses and all orders for this visit:    Chronic left shoulder pain  -     Ambulatory Referral to Physical Therapy; Future    Tendinitis of left rotator cuff  -     Ambulatory Referral to Physical Therapy; Future      Most recent Ha1c 7.0  Discussed Naproxen use and PT  If no improvement t/c repeat Left shoulder CSI vs MRI  T/c Xray C spine    Return for 4-6 weeks.      Subjective:   Patient ID: Sena Adhikari is a 64 y.o. male.    Sena returns with son for several weeks of Lateral anterior lateral and posterior shoulder pain and notes intermittent sharp pains with reaching.  He did have Left SA CSI last visit with significant benefit.  Also with less right shoulder pain.  He notes tightness of the upper back and left periscapular/UT area.    He exercises with swimming and to a lesser extent lifting weights recently  Was prescribed Voltaren and naproxen     Patient new to me presents with his son for about a month of left shoulder pain denies any injury.  He does have a history of prior left shoulder pain he was treated with physical therapy and an ultrasound-guided biceps tendon injection which did help his symptoms.      Review of Systems    The following portions of the patient's chart were reviewed and updated as appropriate:   Allergy:  No Known Allergies    Medications:    Current Outpatient Medications:     atorvastatin (LIPITOR) 40 mg tablet, Take 1 tablet (40 mg total) by mouth daily, Disp: 90 tablet, Rfl: 1    Blood Glucose Monitoring Suppl (OneTouch Verio) w/Device KIT, Use 2 (two) times a day, Disp: 1 kit, Rfl: 0    cetirizine (ZyrTEC) 10 mg tablet, Take 1 tablet (10 mg total) by mouth daily, Disp: 30 tablet, Rfl: 5    Diclofenac Sodium (VOLTAREN) 1 %, Apply 2 g topically 4 (four) times a day, Disp: 150 g, Rfl: 1    Empagliflozin (Jardiance) 25 MG TABS, Take 1 tablet (25 mg total) by mouth daily, Disp: 90 tablet, Rfl: 1    glucose blood (OneTouch Verio) test strip, Use as  "instructed, Disp: 100 strip, Rfl: 2    Lancets (onetouch ultrasoft) lancets, Use as instructed, Disp: 100 each, Rfl: 2    lidocaine (LMX) 4 % cream, Apply topically daily, Disp: 28 g, Rfl: 0    metFORMIN (GLUCOPHAGE) 1000 MG tablet, Take 1 tablet (1,000 mg total) by mouth 2 (two) times a day, Disp: 180 tablet, Rfl: 5    Multiple Vitamins-Minerals (multivitamin with minerals) tablet, TAKE ONE TABLET BY MOUTH EVERY DAY, Disp: 90 tablet, Rfl: 1    Multiple Vitamins-Minerals (Ocuvite-Lutein) CAPS, Take 1 capsule by mouth daily, Disp: 90 capsule, Rfl: 1    sildenafil (VIAGRA) 25 MG tablet, Take 1 tablet (25 mg total) by mouth daily as needed for erectile dysfunction, Disp: 10 tablet, Rfl: 0    tamsulosin (FLOMAX) 0.4 mg, TAKE ONE CAPSULE BY MOUTH EVERY DAY WITH DINNER, Disp: 30 capsule, Rfl: 11    triamcinolone (KENALOG) 0.1 % cream, Apply topically 2 (two) times a day (Patient not taking: Reported on 3/11/2024), Disp: 30 g, Rfl: 1    Patient Active Problem List   Diagnosis    Type 2 diabetes mellitus without complication, without long-term current use of insulin ([facility])    Subclinical thyrotoxicosis    Class 1 obesity due to excess calories with serious comorbidity and body mass index (BMI) of 33.0 to 33.9 in adult    Benign prostatic hyperplasia with lower urinary tract symptoms    Microscopic hematuria    Arthritis of left acromioclavicular joint    Chronic left shoulder pain    CKD (chronic kidney disease) stage 2, GFR 60-89 ml/min    Mixed hyperlipidemia    Chronic urticaria       Objective:  /74   Pulse 76   Ht 5' 4\" (1.626 m)   Wt 88 kg (194 lb)   BMI 33.30 kg/m²     Right Shoulder Exam     Range of Motion   External rotation:  normal       Left Shoulder Exam     Range of Motion   Active abduction:  normal   External rotation:  abnormal     Muscle Strength   External rotation: 5/5   Supraspinatus: 5/5     Tests   Drop arm: negative             Physical Exam      Neurologic Exam    Procedures    I have " personally reviewed the written report of the pertinent studies.             Past Medical History:   Diagnosis Date    Diabetes mellitus (HCC)        History reviewed. No pertinent surgical history.    Social History     Socioeconomic History    Marital status: Single     Spouse name: Not on file    Number of children: Not on file    Years of education: Not on file    Highest education level: Not on file   Occupational History    Not on file   Tobacco Use    Smoking status: Former     Passive exposure: Past    Smokeless tobacco: Never   Vaping Use    Vaping status: Never Used   Substance and Sexual Activity    Alcohol use: No    Drug use: No    Sexual activity: Not Currently   Other Topics Concern    Not on file   Social History Narrative    No Hindu beliefs    Primary spoken language Mandarin     Social Determinants of Health     Financial Resource Strain: Low Risk  (2/20/2024)    Overall Financial Resource Strain (CARDIA)     Difficulty of Paying Living Expenses: Not hard at all   Food Insecurity: No Food Insecurity (2/20/2024)    Hunger Vital Sign     Worried About Running Out of Food in the Last Year: Never true     Ran Out of Food in the Last Year: Never true   Transportation Needs: No Transportation Needs (2/20/2024)    PRAPARE - Transportation     Lack of Transportation (Medical): No     Lack of Transportation (Non-Medical): No   Physical Activity: Not on file   Stress: Not on file   Social Connections: Not on file   Intimate Partner Violence: Not on file   Housing Stability: Low Risk  (5/21/2024)    Housing Stability Vital Sign     Unable to Pay for Housing in the Last Year: No     Number of Times Moved in the Last Year: 1     Homeless in the Last Year: No       History reviewed. No pertinent family history.

## 2024-09-11 NOTE — PATIENT INSTRUCTIONS
You may use Advil (ibuprofen) 400-600mg every 6 hours or at least twice per day (OR Aleve (naproxen) 250-500mg every 12 hours as needed for pain and inflammation).  However do not mix or take other NSAIDs together such as Advil, Motrin, ibuprofen, Celebrex, naproxen, diclofenac or Aleve.    You may also take Tylenol (acetaminophen) together with Advil (ibuprofen) or Aleve (naproxen) as this is safe and can help decrease your pain levels.  The dosing for Tylenol is 500mg every 4-6 hours as needed OR max 1,000mg per dose up to 3 times per day for a total of 3,000mg per day  *Check with your primary care physician to see if these medications are safe to take and to make sure they do not interfere with your other medications and medical issues.      Patient Education     Rotator Cuff Tear Exercises   About this topic   A rotator cuff tear is a problem that can limit how much you can move your shoulder. It can also be painful. The rotator cuff is a group of muscles and tendons in your shoulder. It helps your shoulder move and be steady. These muscles help to rotate and lift the arm. Tendons are strong bands that connect muscles to bones. You can tear a tendon in your shoulder if you fall or move your shoulder too fast and with too much force. Your tendon can also wear out over time and tear. Large tears may require surgery. For small tears, exercises may help make this problem better.  General   Before starting with a program, ask your doctor if you are healthy enough to do these exercises. Your doctor may have you work with a  or physical therapist to make a safe exercise program to meet your needs. You should not do the exercises if they cause sharp pains. You may need to start out with easy exercises at first. Then, once your shoulder is feeling better, you may start the harder exercises.  Passive Exercises:   You use the movement of your body to move your arm. Do NOT use your shoulder muscles to move your  arm.  Pendulum exercises ? Hold onto a table with one hand and bend forward at the waist until your back is level with the table. Let your sore arm hang in front of you. Do each exercise for 1 minute.  Circles ? Move your body in large circles one way. After you move a few times, your arm should start gently moving in circles. Now, move your body in circles the other way until your arm moves the other way.  Swinging side-to-side ? Move your body side to side. After you move a few times, your arm should start gently moving side-to-side.  Swinging back and forth ? Move your body forwards and then backwards. After you move a few times, your arm should start gently moving back and forth.  Stretching Exercises   Stretching exercises keep your muscles flexible. They also stop them from getting tight. Start by doing each of these stretches 2 to 3 times. In order for your body to make changes, you will need to hold these stretches for 20 to 30 seconds. Try to do the stretches 2 to 3 times each day. Do all exercises slowly.  Strengthening Exercises   Strengthening exercises keep your muscles firm and strong. Start by repeating each exercise 2 to 3 times. Work up to doing each exercise 10 times. Try to do the exercises 2 to 3 times each day. Do all exercises slowly.  Shoulder blade squeezes ? Pinch your shoulder blades together on your upper back and hold 3 to 5 seconds. Relax.  Cane rehab exercises:  Overhead flexions ? Lie down and grab the cane with both hands. Start with your arms straight and the cane resting on your upper legs. Keep your arms straight and lift the cane over your head.  Abductions or side-to-side motion ? Stand and grab the cane with both hands. Turn your thumbs to the left to stretch your left shoulder. Start with your arms straight and the cane resting on your upper legs. Push the cane to the left, raising your left arm. Keep your left elbow straight. Keep pushing until you feel a good stretch. Switch  the position of your hands to point your thumbs to the right and push the cane right to stretch your right shoulder.  External rotations or side-to-side rotations ? Lie down and grab the cane with both hands. Start with your elbows bent and touching your body. Put the tip of the cane in the palm of your right hand to stretch your right shoulder. Grab the cane at the other end with your left hand. Push the cane sideways into your right palm until you feel a stretch in your shoulder. Be sure to keep your right elbow close to your body while you are stretching. Switch hands to stretch the left shoulder.  Internal rotations ? Stand up and grab the cane with both hands behind your back. With your palms facing backwards, slide the cane up your back. Go until you feel a good stretch in front of the shoulder.  Shoulder blade exercises ? Lie on your stomach near the edge of a mat or bed or supported on an exercise ball. Start with your arms hanging down in a relaxed position.  Y position ? Raise your arms up and to the sides so your elbows are near your ears and your arms are straight out diagonally like the letter Y. Lower the arms back to the starting position.  T position ? Raise your arms straight out to the sides, even with your shoulders. Lower the arms back to the starting position.  W position ? Raise your arms up and to the sides with your elbows bent. Your hands should be just above your shoulders and looks like a W from above. Lower the arms back to the starting position.  L position ? Keep your elbows at your sides and raise just your lower arms out to the side to make a letter L and a backwards letter L. Lower the arms back to the starting position.  Shoulder exercises ? Tie a knot in an exercise band. Secure the band in a door by shutting it in around waist level. Wrap the band around one hand for a good . Stand away from the door enough to have slight tension in the band. As the exercises get easier, you  "may need to change to a heavier band. Moving closer to, or farther away from, the point where the band is tied down will decrease or increase the tension in the band.  Internal rotations ? Face sideways with the arm holding the band closest to the door. Bend the elbow to 90 degrees or in an \"L\" position. Keeping your elbow by your side and bent, pull the band across your body. Bring it back to the starting position. Repeat with the other arm.  External rotations ? Face sideways with the arm holding the band farthest from the door. Bend the elbow to 90 degrees or in an \"L\" position. Keeping your elbow by your side and bent, pull the band away from your body. Bring it back to the starting position. Repeat with the other arm.  Abductions ? Face sideways with the arm holding the band farthest from the door. Be sure that your thumb is facing upward. Keep your elbow straight and pull the band out to the side and away from your body. Go up to shoulder level. Bring it back to the starting position. Repeat with the other arm.  Flexions ? Face away from the door. Keeping your elbow straight, pull the band straight out in front of you. Bring it back to the starting position. Repeat with the other arm.               What will the results be?   Less pain  More strength  Able to move your arm more  Easier to do daily activities  Shoulder is more stable  Prevent re-injury  Helpful tips   Stay active and work out to keep your muscles strong and flexible.  Eat a healthy diet to keep your muscles healthy.  Be sure you do not hold your breath when exercising. This can raise your blood pressure. If you tend to hold your breath, try counting out loud when exercising. If any exercise bothers you, stop right away.  Always warm up before stretching. Heated muscles stretch much easier than cool muscles. Stretching cool muscles can lead to injury.  Try swinging your arms at an easy pace for a few minutes to warm up your muscles. Do this " again after exercising.  Never bounce when doing stretches.  After exercising, it is a good idea to use ice. Place an ice pack or a bag of frozen peas wrapped in a towel over the painful part. Never put ice right on the skin. Do not leave the ice on more than 10 to 15 minutes at a time. Ice after activity may help decrease pain and swelling. Never ice before stretching.  Doing exercises before a meal may be a good way to get into a routine.  Exercise may be slightly uncomfortable, but you should not have sharp pains. If you do get sharp pains, stop what you are doing. If the sharp pains continue, call your doctor.  Last Reviewed Date   2020-03-10  Consumer Information Use and Disclaimer   This generalized information is a limited summary of diagnosis, treatment, and/or medication information. It is not meant to be comprehensive and should be used as a tool to help the user understand and/or assess potential diagnostic and treatment options. It does NOT include all information about conditions, treatments, medications, side effects, or risks that may apply to a specific patient. It is not intended to be medical advice or a substitute for the medical advice, diagnosis, or treatment of a health care provider based on the health care provider's examination and assessment of a patient’s specific and unique circumstances. Patients must speak with a health care provider for complete information about their health, medical questions, and treatment options, including any risks or benefits regarding use of medications. This information does not endorse any treatments or medications as safe, effective, or approved for treating a specific patient. [employer] and its affiliates disclaim any warranty or liability relating to this information or the use thereof. The use of this information is governed by the Terms of Use, available at https://www.[employer].com/en/know/clinical-effectiveness-terms   Copyright   Copyright ©  2024 [employer], [employer] and its affiliates and/or licensors. All rights reserved.

## 2024-09-16 ENCOUNTER — OFFICE VISIT (OUTPATIENT)
Dept: DENTISTRY | Facility: CLINIC | Age: 64
End: 2024-09-16

## 2024-09-16 VITALS — HEART RATE: 76 BPM | TEMPERATURE: 96.8 F | SYSTOLIC BLOOD PRESSURE: 101 MMHG | DIASTOLIC BLOOD PRESSURE: 70 MMHG

## 2024-09-16 DIAGNOSIS — K08.109 COMPLETE EDENTULISM, UNSPECIFIED EDENTULISM CLASS: Primary | ICD-10-CM

## 2024-09-16 PROCEDURE — D5120 COMPLETE DENTURE - MANDIBULAR: HCPCS

## 2024-09-16 PROCEDURE — D5110 COMPLETE DENTURE - MAXILLARY: HCPCS

## 2024-09-16 NOTE — PROGRESS NOTES
Pt presents for a dental Denture treatment for Delivery and verbally consents for treatment:    Reviewed health history-  Pt is ASA type III  Treatment consents signed: Yes   Pain Scale: 0  Radiographs: Films are current    Denture tried intraorally, pt confirmed satisfaction with esthetics via pt mirror. Denture seated, guided patient through mastication and border molding movements. Adjusted areas of impingement with acrylic burs. Occlusion verified and adjusted. Confirmed patient satisfied with fit and no remaining areas of discomfort. Provided denture home care instructions and storage case. Pt understands and left satisfied and ambulatory.       Prognosis is Good.   Referrals Needed: No  Next visit:  denture follow up as needed

## 2024-09-18 ENCOUNTER — OFFICE VISIT (OUTPATIENT)
Dept: DENTISTRY | Facility: CLINIC | Age: 64
End: 2024-09-18

## 2024-09-18 VITALS — SYSTOLIC BLOOD PRESSURE: 105 MMHG | TEMPERATURE: 97.5 F | HEART RATE: 80 BPM | DIASTOLIC BLOOD PRESSURE: 70 MMHG

## 2024-09-18 DIAGNOSIS — Z01.20 ENCOUNTER FOR DENTAL EXAMINATION: Primary | ICD-10-CM

## 2024-09-18 PROCEDURE — WIS5009 POST-OP DENTURE ADJUSTMENT

## 2024-09-18 PROCEDURE — D5899 UNSPECIFIED REMOVABLE PROSTHODONTIC PROCEDURE, BY REPORT: HCPCS

## 2024-09-18 NOTE — PROGRESS NOTES
Denture Adjustment    Pt presents for denture adjustment.      Denture seated but patient noted discomfort buccal area adjacent to #2, 3, and 8 site buccal premolar area of lower. Marked areas of irritation with downey stick, Verified internal and removed any interferences that prevent complete and comfortable seating. Pt stated he liked the aesthetics, feel, and comfort. Advised patient to return if he feels any pain or discomfort when wearing dentures for adjustments. Pt understood and left in good spirits.     NV: Adjustment in two weeks

## 2024-11-01 ENCOUNTER — OFFICE VISIT (OUTPATIENT)
Dept: DENTISTRY | Facility: CLINIC | Age: 64
End: 2024-11-01

## 2024-11-01 VITALS — DIASTOLIC BLOOD PRESSURE: 65 MMHG | SYSTOLIC BLOOD PRESSURE: 104 MMHG | TEMPERATURE: 97.5 F | HEART RATE: 79 BPM

## 2024-11-01 DIAGNOSIS — Z01.20 ENCOUNTER FOR DENTAL EXAMINATION: Primary | ICD-10-CM

## 2024-11-01 PROCEDURE — D5899 UNSPECIFIED REMOVABLE PROSTHODONTIC PROCEDURE, BY REPORT: HCPCS

## 2024-11-01 PROCEDURE — WIS5009 POST-OP DENTURE ADJUSTMENT

## 2024-11-01 NOTE — PROGRESS NOTES
Denture Adjustment    Pt presents for denture adjustment.      Denture seated but patient noted discomfort on anterior buccal area. Verified internal and removed any interferences that prevent complete and comfortable seating. Lower partial pops up a lot, I reduced the lower anterior buccal and lingual, patient states that he noticed an improvement. Pt stated she liked the aesthetics, feel, and comfort. Pt understood and left in good spirits.     NV: Adjustment in 2-3 weeks

## 2024-11-25 ENCOUNTER — OFFICE VISIT (OUTPATIENT)
Dept: FAMILY MEDICINE CLINIC | Facility: CLINIC | Age: 64
End: 2024-11-25

## 2024-11-25 VITALS
WEIGHT: 194 LBS | HEART RATE: 105 BPM | RESPIRATION RATE: 16 BRPM | TEMPERATURE: 98 F | OXYGEN SATURATION: 99 % | DIASTOLIC BLOOD PRESSURE: 62 MMHG | BODY MASS INDEX: 33.12 KG/M2 | HEIGHT: 64 IN | SYSTOLIC BLOOD PRESSURE: 100 MMHG

## 2024-11-25 DIAGNOSIS — E78.2 MIXED HYPERLIPIDEMIA: ICD-10-CM

## 2024-11-25 DIAGNOSIS — R21 RASH: ICD-10-CM

## 2024-11-25 DIAGNOSIS — E11.9 TYPE 2 DIABETES MELLITUS WITHOUT COMPLICATION, WITHOUT LONG-TERM CURRENT USE OF INSULIN (HCC): ICD-10-CM

## 2024-11-25 LAB — SL AMB POCT HEMOGLOBIN AIC: 8 (ref ?–6.5)

## 2024-11-25 PROCEDURE — 99214 OFFICE O/P EST MOD 30 MIN: CPT | Performed by: FAMILY MEDICINE

## 2024-11-25 PROCEDURE — 83036 HEMOGLOBIN GLYCOSYLATED A1C: CPT | Performed by: FAMILY MEDICINE

## 2024-11-25 RX ORDER — ATORVASTATIN CALCIUM 40 MG/1
40 TABLET, FILM COATED ORAL DAILY
Qty: 90 TABLET | Refills: 1 | Status: SHIPPED | OUTPATIENT
Start: 2024-11-25

## 2024-11-25 RX ORDER — TRIAMCINOLONE ACETONIDE 1 MG/G
CREAM TOPICAL 2 TIMES DAILY
Qty: 30 G | Refills: 1 | Status: SHIPPED | OUTPATIENT
Start: 2024-11-25

## 2024-11-25 NOTE — ASSESSMENT & PLAN NOTE
Lab Results   Component Value Date    HGBA1C 8.0 (A) 11/25/2024     Increased from 7.0 to 8.0    - Current medications:  Metformin 1000 mg bid, Jardiance 25 mg daily  Glucose monitoring: glucometer, fingersticks- 89, 94, 136  Diet: eating 2 meals a day-breakfast and lunch, snacking often    Plan:  Patient does not want to increase medications at this time  Advised low-carb diet, less sweets  3. Ophthalmology: Referred for DM eye exam  4.  DM foot exam completed 2024    Orders:    POCT hemoglobin A1c    IRIS Diabetic eye exam

## 2024-11-25 NOTE — PROGRESS NOTES
Name: Sena Adhikari      : 1960      MRN: [id number]  Encounter Provider: Bianka Caldera MD  Encounter Date: 2024   Encounter department: [facility]  :  Assessment & Plan  Type 2 diabetes mellitus without complication, without long-term current use of insulin (HCC)    Lab Results   Component Value Date    HGBA1C 8.0 (A) 2024     Increased from 7.0 to 8.0    - Current medications:  Metformin 1000 mg bid, Jardiance 25 mg daily  Glucose monitoring: glucometer, fingersticks- 89, 94, 136  Diet: eating 2 meals a day-breakfast and lunch, snacking often    Plan:  Patient does not want to increase medications at this time  Advised low-carb diet, less sweets  3. Ophthalmology: Referred for DM eye exam  4.  DM foot exam completed     Orders:    POCT hemoglobin A1c    IRIS Diabetic eye exam    Rash    Orders:    triamcinolone (KENALOG) 0.1 % cream; Apply topically 2 (two) times a day    Mixed hyperlipidemia  Continue Atorvastatin 40 mg daily  Low Fat Diet, regular Exercise    Orders:    atorvastatin (LIPITOR) 40 mg tablet; Take 1 tablet (40 mg total) by mouth daily           History of Present Illness     HPI    eSna Adhikari is a 64 y.o. male  has a past medical history of Diabetes mellitus (HCC). who presented to the office today accompanied by his son.  Patient states that they bought a new cupping machine and they have been using it on his left shoulder area.  This has helped him with his chronic left shoulder pain and he has been able to sleep through the night.    As far as his blood sugars, he brings in a record of fingersticks ranging from 94-1 36 fasting.  He states he has been eating rice and noodles with soup, and sweets as well.  He goes to the sauna twice a week    The following portions of the patient's history were reviewed and updated as appropriate: allergies, current medications, past family history, past medical history, past social history, past  "surgical history and problem list.    Review of Systems   Constitutional:  Negative for chills and fever.   HENT:  Negative for congestion, rhinorrhea and sore throat.    Respiratory:  Negative for cough and shortness of breath.    Cardiovascular:  Negative for chest pain.   Gastrointestinal:  Negative for diarrhea, nausea and vomiting.   Skin:  Negative for rash.   Allergic/Immunologic: Positive for environmental allergies.   Neurological:  Negative for dizziness and headaches.          Objective   /62 (BP Location: Right arm, Patient Position: Sitting, Cuff Size: Standard)   Pulse 105   Temp 98 °F (36.7 °C) (Temporal)   Resp 16   Ht 5' 4\" (1.626 m)   Wt 88 kg (194 lb)   SpO2 99%   BMI 33.30 kg/m²      Physical Exam  Vitals and nursing note reviewed.   Constitutional:       General: He is not in acute distress.     Appearance: He is well-developed.   HENT:      Head: Normocephalic and atraumatic.      Right Ear: External ear normal.      Left Ear: External ear normal.      Mouth/Throat:      Mouth: Mucous membranes are moist.   Eyes:      Conjunctiva/sclera: Conjunctivae normal.   Cardiovascular:      Rate and Rhythm: Normal rate.      Heart sounds: No murmur heard.  Pulmonary:      Effort: Pulmonary effort is normal. No respiratory distress.   Abdominal:      Palpations: Abdomen is soft.      Tenderness: There is no abdominal tenderness.   Musculoskeletal:         General: No swelling.      Cervical back: Neck supple.      Right lower leg: No edema.      Left lower leg: No edema.   Skin:     General: Skin is warm and dry.      Capillary Refill: Capillary refill takes less than 2 seconds.   Neurological:      General: No focal deficit present.      Mental Status: He is alert and oriented to person, place, and time.   Psychiatric:         Mood and Affect: Mood normal.         Behavior: Behavior normal.         "

## 2024-11-25 NOTE — ASSESSMENT & PLAN NOTE
Continue Atorvastatin 40 mg daily  Low Fat Diet, regular Exercise    Orders:    atorvastatin (LIPITOR) 40 mg tablet; Take 1 tablet (40 mg total) by mouth daily

## 2024-11-25 NOTE — PATIENT INSTRUCTIONS
"Patient Education     Carb counting for adults with diabetes   The Basics   Written by the doctors and editors at [facility]   What is carb counting? -- This is a type of meal planning that many people with diabetes use. It is a way to figure out how many carbohydrates, or \"carbs,\" you eat.  The body breaks down the food we eat into 3 main types of nutrients: carbs, proteins, and fats. Carbs are sugars and starches that come from food. The body uses carbs for energy.  Why do I need to count carbs? -- People with diabetes need to pay attention to how many carbs they eat. This is because carbs raise your blood sugar level.  Carb counting helps you:   Choose the right amount of insulin to take before meals and snacks - If you take insulin before meals, the dose depends on several things, including how many carbs you plan to eat. (It also depends on how much you plan to exercise and your blood sugar level.)   Plan your meals and snacks for the day - You can use carb counting to figure out how many carbs to eat at each meal and snack. This helps you make sure that you eat the right amount over the entire day.   Keep your blood sugar levels well managed - Spreading out the carbs you eat over a whole day can help keep your blood sugar from getting too high. If you take insulin or another diabetes medicine that can cause low blood sugar, eating about the same amount of carbs at each meal every day also helps keep your blood sugar from getting too low. Reducing the amount of carbs you eat can help you manage your diabetes better and prevent medical problems that diabetes can cause.  Your doctor, nurse, or dietitian (food expert) can help you figure out how many carbs to try to eat each day. This will depend on your eating habits, weight, activity level, and which diabetes medicines you take.  People who take insulin before meals might need to be very careful when they count the carbs in every meal and snack. This is so they " "can give themselves the right amount of insulin. If the insulin dose doesn't match the amount of carbs, their blood sugar might get too low or too high. Other people might be able to be a little more flexible as long as they get about the same amount of carbs at each meal or throughout the day.  Which foods have carbs? -- Foods with a lot of carbs include:   Grains - These include bread, pasta, rice, and cereal.   Fruits and starchy vegetables - Starchy vegetables include potatoes, corn, and squash.   Milk and other dairy products - Dairy products include cheese and yogurt.   Foods with added sugar - These include sweets and baked goods likes cookies and cakes, as well as sugary drinks like juice and soda.  It is best to get most of your carbs from fruits, vegetables, whole grains (like whole-wheat bread, whole-grain cereals, and brown rice), and low-fat milk and dairy products.  How do I count carbs? -- To count carbs in packaged foods, check the food's nutrition label (if it has one).  On the label (figure 1), check for:   \"Total Carbohydrate\" number - This tells you how many carbs are in 1 serving size of the food. If you eat 1 serving, then the number of carbs you eat is the same as the number of total carbohydrates.   \"Serving size\" - This tells you how much food is in 1 serving. If you have 2 servings, the number of carbs will be 2 times the number of carbohydrates listed.   \"Dietary Fiber\" - Fiber is a carb that is not digested, which means that it does not raise blood sugar. Foods with a lot of fiber can help manage your blood sugar. If a food has more than 5 grams (g) of fiber, you need less insulin to cover the total carbs in that food. So, if you are calculating an insulin dose, only count the carbs that are not from fiber (figure 1).  What is exchange planning? -- Exchange planning, or the \"exchange system,\" is a way for people to plan their meals without reading labels. This can be helpful since many " "foods don't come with a nutrition label.  The exchange system involves knowing how much of different foods have about 15 grams of carbs (table 1 and table 2 and table 3). Your doctor, nurse, or dietitian gives you a certain number of \"carb choices\" to eat with each meal and snack (table 4). Each \"choice\" is a portion of food that has about 15 grams of carbs. Knowing your options makes it easier to \"exchange\" 1 carb choice for another as you plan your meals and snacks. For example, 1 small apple could be exchanged for 1/3 cup of pasta.  How can I plan my meals? -- First, make sure that you know how many carbs you should be eating each day. Ask your doctor, nurse, or dietitian if you are not sure.  Here are some tips that might help:   Spread out your carbs over 4 to 6 small meals each day instead of 3 big ones.   Eat a similar number of carbs at each meal, for example, at each dinner.   Eat your meals at a similar time each day.   Plan your meals ahead of time.   Use the \"plate method.\" This is a simpler way to make sure that you get a good balance of carbs and other nutrients with each meal. It is not as exact as counting all of your carbs, but it can be helpful for people who prefer a simpler approach. If you take insulin before meals, it is generally better to adjust your insulin dose by counting how many carbs you plan to eat or using the exchange planning strategy.  For the plate method, you start with a plate about 9 inches (23 cm) across. Fill it with (figure 2):   1/2 non-starchy vegetables   1/4 protein   1/4 carbs   Follow your doctor's instructions for how and when to check your blood sugar. This can help you learn how certain foods affect your blood sugar.   Keep track of your meals and blood sugar levels. Show this to your doctor or nurse so they can adjust your treatment if needed. If you take insulin, you will also need to keep track of your exercise patterns and how much insulin you give yourself with " "each dose.   If you take insulin, make sure that you understand how to use it. This includes knowing how to adjust the dose based on your blood sugar level and what you plan to eat. Foods that have a lot of protein or fat also can affect your blood sugar level. Some people need to adjust their insulin doses when they eat these foods.   Remember that other things besides carbs can raise or lower your blood sugar level. These things can include exercise, getting sick, drinking alcohol, traveling, and stress. If you take insulin, make sure that you know how and when to adjust your dose in these situations.  If you are having trouble counting carbs or managing your blood sugar, talk to your doctor or nurse. They can help. A dietitian can also help you plan specific menus that will give you the right amount of carbs each day.  For more information, you can also get a book on counting carbs or check the American Diabetes Association website (www.diabetes.org).  All topics are updated as new evidence becomes available and our peer review process is complete.  This topic retrieved from Recommend on: Mar 27, 2024.  Topic 47004 Version 11.0  Release: 32.2.4 - C32.85  © 2024 UpToDate, Inc. and/or its affiliates. All rights reserved.  figure 1: Counting carbohydrates     To figure out the \"carb count\" in 1 serving, start with the number of grams of total carbohydrates (46 grams), then subtract the number of grams of dietary fiber (7 grams). It's also important to look at the serving size. In this example, the carb count is 39 grams. You can use this number when counting carbs for your insulin dose.  Graphic 37932 Version 8.0  table 1: Bread and grains with 15 grams of carbs*  Bread    Food  Serving size    Bagel 1/4 large bagel (1 oz)   Biscuit 1 biscuit (2.5 inches across)   Bread, reduced calorie, light 2 slices (1.5 oz)   Cornbread 1.75 inch cube (1.5 oz)   English muffin 1/2 muffin   Hot dog or hamburger bun 1/2 bun (3/4 oz) " "  Naan, chapati, or roti 1 oz   Pancake 1 pancake (4 inches across, 1/4 inch thick)   Maryann (6 inches across) 1/2 maryann   Tortilla, corn 1 small tortilla (6 inches across)   Tortilla, flour (white or whole wheat) 1 small tortilla (6 inches across) or 1/3 large tortilla (10 inches across)   Waffle 1 waffle (4-inch square or 4 inches across)   Cereals and grains (including pasta and rice)    Food  Serving size (cooked)    Barley, couscous, millet, pasta (white or whole wheat, all shapes and sizes), polenta, quinoa (all colors), or rice (white, brown, and other colors and types) 1/3 cup   Bran cereal (twigs, buds, or flakes), shredded wheat (plain), or sugar-coated cereal 1/2 cup   Bulgur, kasha, tabbouleh (tabouli), or wild rice 1/2 cup   Granola cereal 1/4 cup   Hot cereal (oats, oatmeal, grits) 1/2 cup   Unsweetened, ready-to-eat cereal 3/4 cup   * For bread and grains, 15 grams of carbs is considered 1 serving or \"choice\" for people who need to count carbs.  Graphic [id number] Version 1.0  table 2: Fruits with 15 grams of carbs*  Food  Serving size    Applesauce, unsweetened 1/2 cup   Banana 1 extra small banana, about 4 inches long (4 oz)   Blueberries 3/4 cup   Dried fruits (blueberries, cherries, cranberries, mixed fruit, raisins) 2 tbsp   Fruit, canned 1/2 cup   Fruit, whole, small (apple) 1 small fruit (4 oz)   Fruit, whole, medium (nectarine, orange, pear, tangerine) 1 medium fruit (6 oz)   Fruit juice, unsweetened 1/2 cup   Grapes 17 small grapes (3 oz)   Melon, diced 1 cup   Strawberries, whole 1 and 1/4 cups   When listed, weight (oz) includes skin and seeds. If you are not sure if your fruit is the right size for 1 serving, you can use a food scale to check the weight.  * For fruits, 15 grams of carbs is considered 1 serving or \"choice\" for people who need to count carbs.  Graphic [id number] Version 1.0  table 3: Starchy vegetables with 15 grams of carbs*  Food  Serving size (cooked)    Cassava, dasheen, or " "plantain 1/3 cup   Corn, green peas, mixed vegetables, or parsnips 1/2 cup   Marinara, pasta, or spaghetti sauce 1/2 cup   Mixed vegetables (with corn or peas) 1 cup   Potato, baked with skin 1/4 large (3 oz)   Potato, Maori-fried (oven-baked) 1 cup (2 oz)   Potato, mashed with milk and fat 1/2 cup   Squash, winter (acorn, butternut) 1 cup   Yam or sweet potato, plain 1/2 cup (3 and 1/2 oz)   If you are not sure if your vegetable is the right size for 1 serving, you can use a food scale to check the weight.  * For starchy vegetables, 15 grams of carbs is considered 1 serving or \"choice\" for people who need to count carbs.  Graphic [id number] Version 1.0  table 4: Sample exchange system meal plan  Time  Exchange pattern  Sample menu  Carbohydrate count (g)    8 am 3 carbohydrate group    2 starch 1 English muffin 30    1 fruit 1 1/4 c strawberries 15    1 protein group 1/4 c cottage cheese -    1 fat group 1 tsp margarine -      Total: 45    12 noon 4 carbohydrate group    2 starch 2 slices of bread 30    1 fruit 1 orange 15    1 vegetable 1 c salad -    1 milk 8 oz skim milk 12    3 protein group 3 oz chicken -    1 fat group 1 tbsp low fat kent -      Total: 57    3 pm 1 carbohydrate group    1 fruit or 1 starch 1 apple or 6 crackers 15      Total: 15    6 pm 4 carbohydrate group    2 starch 1 c potato 30    1 fruit 1/2 c fruit salad 15    1 vegetable 1 c salad -    1 milk 8 oz skim milk 12    6 protein group 6 oz fish -    1 fat group 2 tbsp low fat salad dressing -      Total: 57    9 pm 1 carbohydrate group    1 starch 6 crackers 15    1 protein 2 tbsp peanut butter -      Total: 15    Graphic 17576 Version 3.0  figure 2: The \"plate method\"     For the plate method, you start with a plate about 9 inches (23 cm) across. Then fill it with 1/2 non-starchy vegetables, 1/4 protein, and 1/4 carbs.  Graphic 460054 Version 2.0  Consumer Information Use and Disclaimer   Disclaimer: This generalized information is a limited " summary of diagnosis, treatment, and/or medication information. It is not meant to be comprehensive and should be used as a tool to help the user understand and/or assess potential diagnostic and treatment options. It does NOT include all information about conditions, treatments, medications, side effects, or risks that may apply to a specific patient. It is not intended to be medical advice or a substitute for the medical advice, diagnosis, or treatment of a health care provider based on the health care provider's examination and assessment of a patient's specific and unique circumstances. Patients must speak with a health care provider for complete information about their health, medical questions, and treatment options, including any risks or benefits regarding use of medications. This information does not endorse any treatments or medications as safe, effective, or approved for treating a specific patient. UpToDate, Inc. and its affiliates disclaim any warranty or liability relating to this information or the use thereof.The use of this information is governed by the Terms of Use, available at https://www.wolterskluwer.com/en/know/clinical-effectiveness-terms. 2024© UpToDate, Inc. and its affiliates and/or licensors. All rights reserved.  Copyright   © 2024 UpToDate, Inc. and/or its affiliates. All rights reserved.

## 2024-12-04 ENCOUNTER — OFFICE VISIT (OUTPATIENT)
Dept: DENTISTRY | Facility: CLINIC | Age: 64
End: 2024-12-04

## 2024-12-04 VITALS — TEMPERATURE: 99.1 F | HEART RATE: 87 BPM | SYSTOLIC BLOOD PRESSURE: 103 MMHG | DIASTOLIC BLOOD PRESSURE: 65 MMHG

## 2024-12-04 DIAGNOSIS — Z01.20 ENCOUNTER FOR DENTAL EXAMINATION: Primary | ICD-10-CM

## 2024-12-04 PROCEDURE — D5899 UNSPECIFIED REMOVABLE PROSTHODONTIC PROCEDURE, BY REPORT: HCPCS

## 2024-12-04 PROCEDURE — WIS5009 POST-OP DENTURE ADJUSTMENT

## 2024-12-05 NOTE — PROGRESS NOTES
Procedure Details  [id number] - POST-OP DENTURE ADJUSTMENT  Denture Adjustment    Pt presents for denture adjustment.      Denture seated but patient noted discomfort on upper buccal area and lower #29 site. areas marked with downey stick and and removed any interferences that prevent complete and comfortable seating. Vibrating line marked transferred to denture, area of over extension removed, patient noted difference in retention. Pt stated she liked the aesthetics, feel, and comfort Pt understood and left in good spirits.     NV: Adjustment in about 4 weeks

## 2024-12-05 NOTE — DENTAL PROCEDURE DETAILS
Denture Adjustment    Pt presents for denture adjustment.      Denture seated but patient noted discomfort on upper buccal area and lower #29 site. areas marked with downey stick and and removed any interferences that prevent complete and comfortable seating. Vibrating line marked transferred to denture, area of over extension removed, patient noted difference in retention. Pt stated she liked the aesthetics, feel, and comfort Pt understood and left in good spirits.     NV: Adjustment in about 4 weeks

## 2025-01-08 ENCOUNTER — VBI (OUTPATIENT)
Dept: ADMINISTRATIVE | Facility: OTHER | Age: 65
End: 2025-01-08

## 2025-01-08 NOTE — TELEPHONE ENCOUNTER
01/08/25 3:50 PM     Chart reviewed for Hemoglobin A1c was/were submitted to the patient's insurance.     Anthony Baum MA   [facility]

## 2025-01-10 ENCOUNTER — TELEPHONE (OUTPATIENT)
Dept: FAMILY MEDICINE CLINIC | Facility: CLINIC | Age: 65
End: 2025-01-10

## 2025-01-10 ENCOUNTER — OFFICE VISIT (OUTPATIENT)
Dept: DENTISTRY | Facility: CLINIC | Age: 65
End: 2025-01-10

## 2025-01-10 VITALS — HEART RATE: 76 BPM | SYSTOLIC BLOOD PRESSURE: 99 MMHG | TEMPERATURE: 98 F | DIASTOLIC BLOOD PRESSURE: 62 MMHG

## 2025-01-10 DIAGNOSIS — E11.9 TYPE 2 DIABETES MELLITUS WITHOUT COMPLICATION, WITHOUT LONG-TERM CURRENT USE OF INSULIN (HCC): Primary | ICD-10-CM

## 2025-01-10 DIAGNOSIS — Z01.20 ENCOUNTER FOR DENTAL EXAMINATION: Primary | ICD-10-CM

## 2025-01-10 PROCEDURE — D5899 UNSPECIFIED REMOVABLE PROSTHODONTIC PROCEDURE, BY REPORT: HCPCS

## 2025-01-10 PROCEDURE — WIS5009 POST-OP DENTURE ADJUSTMENT

## 2025-01-19 NOTE — PROGRESS NOTES
Procedure Details  [id number] - POST-OP DENTURE ADJUSTMENT  Denture Adjustment    Sena Adhikari 64 y.o. male presents with Son for Denture Adjustment.  PMH reviewed, no changes, ASA ASA 2 - Patient with mild systemic disease with no functional limitations.  Pain level 0.    Diagnosis:  History of denture delivery 4 months ago.    Subjective report:  Patient notes lower denture loss ness.    Procedure details:  Evaluated edentulous areas for redness or sore spots.  Used PIP paste to identify pressure points and adjusted with acrylic bur.   Checked occlusion and adjusted with acrylic bur.    Reviewed expectations regarding removable prosthesis and made patient aware of implant retained options.    Patient dismissed ambulatory and alert.    NV: denture follow up 1 month.

## 2025-01-19 NOTE — DENTAL PROCEDURE DETAILS
Denture Adjustment    Sena Adhikari 64 y.o. male presents with Son for Denture Adjustment.  PMH reviewed, no changes, ASA ASA 2 - Patient with mild systemic disease with no functional limitations.  Pain level 0.    Diagnosis:  History of denture delivery 4 months ago.    Subjective report:  Patient notes lower denture loss ness.    Procedure details:  Evaluated edentulous areas for redness or sore spots.  Used PIP paste to identify pressure points and adjusted with acrylic bur.   Checked occlusion and adjusted with acrylic bur.    Reviewed expectations regarding removable prosthesis and made patient aware of implant retained options.    Patient dismissed ambulatory and alert.    NV: denture follow up 1 month.

## 2025-02-12 ENCOUNTER — OFFICE VISIT (OUTPATIENT)
Dept: DENTISTRY | Facility: CLINIC | Age: 65
End: 2025-02-12

## 2025-02-12 VITALS — DIASTOLIC BLOOD PRESSURE: 72 MMHG | TEMPERATURE: 97.5 F | HEART RATE: 79 BPM | SYSTOLIC BLOOD PRESSURE: 111 MMHG

## 2025-02-12 DIAGNOSIS — Z01.20 ENCOUNTER FOR DENTAL EXAMINATION: Primary | ICD-10-CM

## 2025-02-12 PROCEDURE — D0120 PERIODIC ORAL EVALUATION - ESTABLISHED PATIENT: HCPCS

## 2025-02-12 PROCEDURE — WIS5009 POST-OP DENTURE ADJUSTMENT

## 2025-02-17 DIAGNOSIS — E11.9 TYPE 2 DIABETES MELLITUS WITHOUT COMPLICATION, WITHOUT LONG-TERM CURRENT USE OF INSULIN (HCC): ICD-10-CM

## 2025-02-18 RX ORDER — MULTIVIT-MIN/IRON FUM/FOLIC AC 7.5 MG-4
1 TABLET ORAL DAILY
Qty: 90 TABLET | Refills: 1 | OUTPATIENT
Start: 2025-02-18

## 2025-02-21 NOTE — PROGRESS NOTES
Procedure Details   - PERIODIC ORAL EVALUATION - ESTABLISHED PATIENT       PERIODIC EXAM (edentulous)   REVIEWED MED HX: meds, allergies, health changes reviewed in [facility]. All consents signed.  CHIEF CONCERN: my dentures are loose  PAIN SCALE:  0  ASA CLASS:  ASA 2 - Patient with mild systemic disease with no functional limitations    Visual and Tactile Intraoral/ Extraoral evaluation: Oral and Oropharyngeal cancer evaluation. No findings     Dr. Delgado Reviewed with patient clinical and radiographic findings and patient verbalized understanding. All questions and concerns addressed.     REFERRALS: None    FINDINGS:     Upper and lower dentures warrant reline due to looseness       TREATMENT  PLAN :   NV: denture impressions for reline    Next Recall: annual recall     Last Panorex/ FMX : 03/07/22  [id number] - POST-OP DENTURE ADJUSTMENT  Denture Adjustment    Sena Adhikari 64 y.o. male presents with Son  for Denture Adjustment.  PMH reviewed, no changes, ASA ASA 2 - Patient with mild systemic disease with no functional limitations.  Pain level 0.    Diagnosis:  History of denture delivery about 5 months ago.    Subjective report:  Patient notes looseness.    Procedure details:  Evaluated edentulous areas for redness or sore spots.    Generalized looseness, reline warranted.    Patient dismissed ambulatory and alert.    NV: upper and lower border molding / final impression for reline

## 2025-02-21 NOTE — DENTAL PROCEDURE DETAILS
Denture Adjustment    Sena Adhikari 64 y.o. male presents with Son  for Denture Adjustment.  PMH reviewed, no changes, ASA ASA 2 - Patient with mild systemic disease with no functional limitations.  Pain level 0.    Diagnosis:  History of denture delivery about 5 months ago.    Subjective report:  Patient notes looseness.    Procedure details:  Evaluated edentulous areas for redness or sore spots.    Generalized looseness, reline warranted.    Patient dismissed ambulatory and alert.    NV: upper and lower border molding / final impression for reline     No

## 2025-02-21 NOTE — DENTAL PROCEDURE DETAILS
PERIODIC EXAM (edentulous)   REVIEWED MED HX: meds, allergies, health changes reviewed in [facility]. All consents signed.  CHIEF CONCERN: my dentures are loose  PAIN SCALE:  0  ASA CLASS:  ASA 2 - Patient with mild systemic disease with no functional limitations    Visual and Tactile Intraoral/ Extraoral evaluation: Oral and Oropharyngeal cancer evaluation. No findings     Dr. Delgado Reviewed with patient clinical and radiographic findings and patient verbalized understanding. All questions and concerns addressed.     REFERRALS: None    FINDINGS:     Upper and lower dentures warrant reline due to looseness       TREATMENT  PLAN :   NV: denture impressions for reline    Next Recall: annual recall     Last Panorex/ FMX : 03/07/22

## 2025-02-25 ENCOUNTER — OFFICE VISIT (OUTPATIENT)
Dept: FAMILY MEDICINE CLINIC | Facility: CLINIC | Age: 65
End: 2025-02-25

## 2025-02-25 VITALS
OXYGEN SATURATION: 98 % | HEIGHT: 65 IN | TEMPERATURE: 97.5 F | WEIGHT: 201.4 LBS | DIASTOLIC BLOOD PRESSURE: 64 MMHG | BODY MASS INDEX: 33.55 KG/M2 | RESPIRATION RATE: 18 BRPM | SYSTOLIC BLOOD PRESSURE: 116 MMHG | HEART RATE: 88 BPM

## 2025-02-25 DIAGNOSIS — Z12.11 SCREEN FOR COLON CANCER: ICD-10-CM

## 2025-02-25 DIAGNOSIS — R80.9 TYPE 2 DIABETES MELLITUS WITH MICROALBUMINURIA, WITHOUT LONG-TERM CURRENT USE OF INSULIN (HCC): Primary | ICD-10-CM

## 2025-02-25 DIAGNOSIS — E11.29 TYPE 2 DIABETES MELLITUS WITH MICROALBUMINURIA, WITHOUT LONG-TERM CURRENT USE OF INSULIN (HCC): Primary | ICD-10-CM

## 2025-02-25 DIAGNOSIS — L73.9 FOLLICULITIS: ICD-10-CM

## 2025-02-25 DIAGNOSIS — E78.2 MIXED HYPERLIPIDEMIA: ICD-10-CM

## 2025-02-25 DIAGNOSIS — Z23 ENCOUNTER FOR IMMUNIZATION: ICD-10-CM

## 2025-02-25 LAB — SL AMB POCT HEMOGLOBIN AIC: 7.7 (ref ?–6.5)

## 2025-02-25 PROCEDURE — 83036 HEMOGLOBIN GLYCOSYLATED A1C: CPT | Performed by: FAMILY MEDICINE

## 2025-02-25 PROCEDURE — 90673 RIV3 VACCINE NO PRESERV IM: CPT | Performed by: FAMILY MEDICINE

## 2025-02-25 PROCEDURE — 99214 OFFICE O/P EST MOD 30 MIN: CPT | Performed by: FAMILY MEDICINE

## 2025-02-25 PROCEDURE — 90471 IMMUNIZATION ADMIN: CPT | Performed by: FAMILY MEDICINE

## 2025-02-25 RX ORDER — ATORVASTATIN CALCIUM 40 MG/1
40 TABLET, FILM COATED ORAL DAILY
Qty: 90 TABLET | Refills: 1 | Status: SHIPPED | OUTPATIENT
Start: 2025-02-25

## 2025-02-25 NOTE — PROGRESS NOTES
Name: Sena Adhikari      : 1960      MRN: [id number]  Encounter Provider: Bianka Caldera MD  Encounter Date: 2025   Encounter department: [facility]  :  Assessment & Plan  Type 2 diabetes mellitus with microalbuminuria, without long-term current use of insulin (HCC)    Lab Results   Component Value Date    HGBA1C 7.7 (A) 2025    HGBA1C 8.0 (A) 2024    HGBA1C 7.0 (H) 2024     Slight improvement    - Current medications:  Metformin 1000 mg bid, Jardiance 25 mg daily  Glucose monitoring: glucometer, fingersticks- 93, 136  Diet: eating 2 meals a day-breakfast and lunch, stopped eating cakes    Plan:  Continue current medications  Advised low-carb diet, less sweets  3. Ophthalmology: Referred for DM eye exam  4.  DM foot exam next visit    Orders:  •  POCT hemoglobin A1c  •  Empagliflozin (Jardiance) 25 MG TABS; Take 1 tablet (25 mg total) by mouth daily  •  Ambulatory Referral to Ophthalmology; Future    Mixed hyperlipidemia  Continue Atorvastatin 40 mg daily  Low Fat Diet, regular Exercise    Orders:  •  atorvastatin (LIPITOR) 40 mg tablet; Take 1 tablet (40 mg total) by mouth daily    Folliculitis    Orders:  •  salicyclic acid-sulfur (SEBULEX) 2-2 % shampoo; Apply topically daily as needed for dandruff    Encounter for immunization    Orders:  •  influenza vaccine, recombinant, PF, 0.5 mL IM (Flublok)    Screen for colon cancer    Orders:  •  Cologuard           History of Present Illness   HPI  Sena Adhikari is a 64 y.o. male  has a past medical history of Chronic left shoulder pain, CKD (chronic kidney disease) stage 2, GFR 60-89 ml/min, Class 1 obesity due to excess calories with serious comorbidity and body mass index (BMI) of 33.0 to 33.9 in adult, Diabetes mellitus (HCC), and Mixed hyperlipidemia. who presented to the office today accompanied by his son.    Patient states that he has stopped eating the cakes, he does not check his fingersticks as  "often anymore because the tips of his fingers are sensitive.  He is taking all medications as prescribed.  He is concerned today about small bumps on the back of his head for the past few weeks.        The following portions of the patient's history were reviewed and updated as appropriate: allergies, current medications, past family history, past medical history, past social history, past surgical history and problem list.  Accompanied by his son  Review of Systems   Constitutional:  Negative for chills and fever.   HENT:  Negative for congestion, rhinorrhea and sore throat.    Respiratory:  Negative for cough and shortness of breath.    Cardiovascular:  Negative for chest pain.   Gastrointestinal:  Negative for diarrhea, nausea and vomiting.   Skin:  Negative for rash.   Allergic/Immunologic: Positive for environmental allergies.   Neurological:  Negative for dizziness and headaches.       Objective   /64 (BP Location: Right arm, Patient Position: Sitting, Cuff Size: Standard)   Pulse 88   Temp 97.5 °F (36.4 °C) (Temporal)   Resp 18   Ht 5' 5\" (1.651 m)   Wt 91.4 kg (201 lb 6.4 oz)   SpO2 98%   BMI 33.51 kg/m²      Physical Exam  Vitals and nursing note reviewed.   Constitutional:       General: He is not in acute distress.     Appearance: He is well-developed.   HENT:      Head: Normocephalic and atraumatic.      Right Ear: External ear normal.      Left Ear: External ear normal.      Mouth/Throat:      Mouth: Mucous membranes are moist.   Eyes:      Conjunctiva/sclera: Conjunctivae normal.   Cardiovascular:      Rate and Rhythm: Normal rate.      Heart sounds: No murmur heard.  Pulmonary:      Effort: Pulmonary effort is normal. No respiratory distress.   Abdominal:      Palpations: Abdomen is soft.      Tenderness: There is no abdominal tenderness.   Musculoskeletal:         General: No swelling.      Cervical back: Neck supple.      Right lower leg: No edema.      Left lower leg: No edema. "   Skin:     General: Skin is warm and dry.      Capillary Refill: Capillary refill takes less than 2 seconds.   Neurological:      General: No focal deficit present.      Mental Status: He is alert and oriented to person, place, and time.   Psychiatric:         Mood and Affect: Mood normal.         Behavior: Behavior normal.

## 2025-02-25 NOTE — ASSESSMENT & PLAN NOTE
Lab Results   Component Value Date    HGBA1C 7.7 (A) 02/25/2025    HGBA1C 8.0 (A) 11/25/2024    HGBA1C 7.0 (H) 08/17/2024       Orders:  •  POCT hemoglobin A1c  •  Empagliflozin (Jardiance) 25 MG TABS; Take 1 tablet (25 mg total) by mouth daily

## 2025-02-25 NOTE — ASSESSMENT & PLAN NOTE
Lab Results   Component Value Date    HGBA1C 7.7 (A) 02/25/2025    HGBA1C 8.0 (A) 11/25/2024    HGBA1C 7.0 (H) 08/17/2024     Slight improvement    - Current medications:  Metformin 1000 mg bid, Jardiance 25 mg daily  Glucose monitoring: glucometer, fingersticks- 93, 136  Diet: eating 2 meals a day-breakfast and lunch, stopped eating cakes    Plan:  Continue current medications  Advised low-carb diet, less sweets  3. Ophthalmology: Referred for DM eye exam  4.  DM foot exam next visit    Orders:  •  POCT hemoglobin A1c  •  Empagliflozin (Jardiance) 25 MG TABS; Take 1 tablet (25 mg total) by mouth daily  •  Ambulatory Referral to Ophthalmology; Future

## 2025-02-25 NOTE — ASSESSMENT & PLAN NOTE
Continue Atorvastatin 40 mg daily  Low Fat Diet, regular Exercise    Orders:  •  atorvastatin (LIPITOR) 40 mg tablet; Take 1 tablet (40 mg total) by mouth daily

## 2025-02-25 NOTE — ASSESSMENT & PLAN NOTE
Lab Results   Component Value Date    HGBA1C 7.7 (A) 02/25/2025     Increased from 7.0 to 8.0    - Current medications:  Metformin 1000 mg bid, Jardiance 25 mg daily  Glucose monitoring: glucometer, fingersticks- 89, 94, 136  Diet: eating 2 meals a day-breakfast and lunch, snacking often    Plan:  Patient does not want to increase medications at this time  Advised low-carb diet, less sweets  3. Ophthalmology: Referred for DM eye exam  4.  DM foot exam completed 2024    SmartLink not supported outside of the Encounter Diagnoses SmartSection.

## 2025-04-07 ENCOUNTER — TELEPHONE (OUTPATIENT)
Dept: FAMILY MEDICINE CLINIC | Facility: CLINIC | Age: 65
End: 2025-04-07

## 2025-04-07 NOTE — TELEPHONE ENCOUNTER
first attempt to contact patient. left message to return my call on answering machine, appointment was rescheduled fom 5/27 to friday 5/30 same time at 120

## 2025-04-15 ENCOUNTER — OFFICE VISIT (OUTPATIENT)
Dept: DENTISTRY | Facility: CLINIC | Age: 65
End: 2025-04-15

## 2025-04-15 VITALS — HEART RATE: 93 BPM | TEMPERATURE: 97.7 F | SYSTOLIC BLOOD PRESSURE: 108 MMHG | DIASTOLIC BLOOD PRESSURE: 72 MMHG

## 2025-04-15 DIAGNOSIS — Z01.20 ENCOUNTER FOR DENTAL EXAMINATION: Primary | ICD-10-CM

## 2025-04-15 PROCEDURE — D5899 UNSPECIFIED REMOVABLE PROSTHODONTIC PROCEDURE, BY REPORT: HCPCS

## 2025-04-15 PROCEDURE — WIS5001 FINAL IMPRESSIONS DENTURE

## 2025-04-15 NOTE — DENTAL PROCEDURE DETAILS
Denture reline Impression    Sena Adhikari 64 y.o. male presents with Son for denture denture reline impression.  PMH reviewed, no changes, ASA 2 - Patient with mild systemic disease with no functional limitations.    Procedure details:  Upper and lower denture border trimmed to accommodate border molding. Confirmed proper seating of the denture, adequate extension over significant anatomy, and short of vestibules.  .  Tray adhesive applied and dried.  Border molded with Greenstick compound.  Final impression taken with Light body PVS in border molded custom tray.  Impression free of voids and captures significant anatomy and vestibules completely.    Patient dismissed ambulatory and alert.    NV: upper and lower denture reline delivery

## 2025-04-15 NOTE — PROGRESS NOTES
Procedure Details  [id number] - FINAL IMPRESSIONS DENTURE  Denture reline Impression    Sena Adhikari 64 y.o. male presents with Son for denture denture reline impression.  PMH reviewed, no changes, ASA 2 - Patient with mild systemic disease with no functional limitations.    Procedure details:  Upper and lower denture border trimmed to accommodate border molding. Confirmed proper seating of the denture, adequate extension over significant anatomy, and short of vestibules.  .  Tray adhesive applied and dried.  Border molded with Greenstick compound.  Final impression taken with Light body PVS in border molded custom tray.  Impression free of voids and captures significant anatomy and vestibules completely.    Patient dismissed ambulatory and alert.    NV: upper and lower denture reline delivery

## 2025-04-21 DIAGNOSIS — R35.1 BENIGN PROSTATIC HYPERPLASIA WITH NOCTURIA: ICD-10-CM

## 2025-04-21 DIAGNOSIS — N40.1 BENIGN PROSTATIC HYPERPLASIA WITH NOCTURIA: ICD-10-CM

## 2025-04-22 RX ORDER — TAMSULOSIN HYDROCHLORIDE 0.4 MG/1
0.4 CAPSULE ORAL
Qty: 30 CAPSULE | Refills: 11 | Status: SHIPPED | OUTPATIENT
Start: 2025-04-22

## 2025-05-07 ENCOUNTER — OFFICE VISIT (OUTPATIENT)
Dept: DENTISTRY | Facility: CLINIC | Age: 65
End: 2025-05-07

## 2025-05-07 VITALS — HEART RATE: 78 BPM | SYSTOLIC BLOOD PRESSURE: 107 MMHG | DIASTOLIC BLOOD PRESSURE: 70 MMHG

## 2025-05-07 DIAGNOSIS — Z01.20 ENCOUNTER FOR DENTAL EXAMINATION: Primary | ICD-10-CM

## 2025-05-07 PROCEDURE — WIS5005 REMAKE

## 2025-05-13 ENCOUNTER — OFFICE VISIT (OUTPATIENT)
Dept: DENTISTRY | Facility: CLINIC | Age: 65
End: 2025-05-13

## 2025-05-13 VITALS — HEART RATE: 96 BPM | DIASTOLIC BLOOD PRESSURE: 78 MMHG | SYSTOLIC BLOOD PRESSURE: 117 MMHG

## 2025-05-13 DIAGNOSIS — Z01.20 ENCOUNTER FOR DENTAL EXAMINATION: Primary | ICD-10-CM

## 2025-05-13 PROCEDURE — WIS5009 POST-OP DENTURE ADJUSTMENT

## 2025-05-13 PROCEDURE — D5899 UNSPECIFIED REMOVABLE PROSTHODONTIC PROCEDURE, BY REPORT: HCPCS

## 2025-05-13 NOTE — PROGRESS NOTES
Procedure Details  [id number] - POST-OP DENTURE ADJUSTMENT  Denture Adjustment    Sena Adhikari 64 y.o. male presents  for Denture Adjustment.  PMH reviewed, no changes, ASA ASA 2 - Patient with mild systemic disease with no functional limitations.  Pain level 1.    Diagnosis:  History of denture reline delivery about 1 week ago.    Subjective report:  Patient notes pain lower left, upper right.    Procedure details:  Evaluated edentulous areas for redness or sore spots.  Used PIP paste to identify pressure points and adjusted with acrylic bur.     Informed patient that discomfort may persist while the soft tissue in the adjusted areas heal.      Patient dismissed ambulatory and alert.    NV: 2-3 week denture adjustment

## 2025-05-13 NOTE — DENTAL PROCEDURE DETAILS
Denture Adjustment    Sena Adhikari 64 y.o. male presents  for Denture Adjustment.  PMH reviewed, no changes, ASA ASA 2 - Patient with mild systemic disease with no functional limitations.  Pain level 1.    Diagnosis:  History of denture reline delivery about 1 week ago.    Subjective report:  Patient notes pain lower left, upper right.    Procedure details:  Evaluated edentulous areas for redness or sore spots.  Used PIP paste to identify pressure points and adjusted with acrylic bur.     Informed patient that discomfort may persist while the soft tissue in the adjusted areas heal.      Patient dismissed ambulatory and alert.    NV: 2-3 week denture adjustment

## 2025-05-17 ENCOUNTER — OFFICE VISIT (OUTPATIENT)
Dept: URGENT CARE | Age: 65
End: 2025-05-17
Payer: MEDICARE

## 2025-05-17 VITALS
DIASTOLIC BLOOD PRESSURE: 58 MMHG | OXYGEN SATURATION: 96 % | WEIGHT: 204 LBS | RESPIRATION RATE: 18 BRPM | BODY MASS INDEX: 32.78 KG/M2 | HEART RATE: 86 BPM | SYSTOLIC BLOOD PRESSURE: 114 MMHG | HEIGHT: 66 IN | TEMPERATURE: 97.3 F

## 2025-05-17 DIAGNOSIS — L03.90 CELLULITIS, UNSPECIFIED CELLULITIS SITE: ICD-10-CM

## 2025-05-17 DIAGNOSIS — L23.7 POISON IVY: Primary | ICD-10-CM

## 2025-05-17 PROCEDURE — 99213 OFFICE O/P EST LOW 20 MIN: CPT | Performed by: PHYSICIAN ASSISTANT

## 2025-05-17 RX ORDER — CEPHALEXIN 500 MG/1
500 CAPSULE ORAL EVERY 8 HOURS SCHEDULED
Qty: 30 CAPSULE | Refills: 0 | Status: SHIPPED | OUTPATIENT
Start: 2025-05-17 | End: 2025-05-27

## 2025-05-17 RX ORDER — DESOXIMETASONE 2.5 MG/G
CREAM TOPICAL 2 TIMES DAILY
Qty: 60 G | Refills: 0 | Status: SHIPPED | OUTPATIENT
Start: 2025-05-17

## 2025-05-17 NOTE — PROGRESS NOTES
"[facility]        NAME: Sena Adhikari is a 64 y.o. male  : 1960    MRN: [id number]  DATE: May 17, 2025  TIME: 1:04 PM    /58   Pulse 86   Temp (!) 97.3 °F (36.3 °C)   Resp 18   Ht 5' 6\" (1.676 m)   Wt 92.5 kg (204 lb)   SpO2 96%   BMI 32.93 kg/m²     Assessment and Plan   Poison ivy [L23.7]  1. Poison ivy  cephalexin (KEFLEX) 500 mg capsule    desoximetasone (TOPICORT) 0.25 % cream      2. Cellulitis, unspecified cellulitis site  cephalexin (KEFLEX) 500 mg capsule    desoximetasone (TOPICORT) 0.25 % cream            Patient Instructions       Follow up with PCP in 3-5 days.  Proceed to  ER if symptoms worsen.    Chief Complaint     Chief Complaint   Patient presents with    Rash     Patient has rash on bilateral legs. Son reports rash present a few weeks ago, went away and returned on 25. Patient reports itching, and tenderness to the area.         History of Present Illness       Pt with bilat itching rash to lower legs for over a week  slight tender to left rash also     Rash        Review of Systems   Review of Systems   Constitutional: Negative.    HENT: Negative.     Eyes: Negative.    Respiratory: Negative.     Cardiovascular: Negative.    Gastrointestinal: Negative.    Endocrine: Negative.    Genitourinary: Negative.    Musculoskeletal: Negative.    Skin:  Positive for rash.   Allergic/Immunologic: Negative.    Neurological: Negative.    Hematological: Negative.    Psychiatric/Behavioral: Negative.     All other systems reviewed and are negative.        Current Medications     Current Medications[1]    Current Allergies     Allergies as of 2025    (No Known Allergies)            The following portions of the patient's history were reviewed and updated as appropriate: allergies, current medications, past family history, past medical history, past social history, past surgical history and problem list.     Past Medical History:   Diagnosis Date    Chronic left shoulder pain " "12/09/2022    CKD (chronic kidney disease) stage 2, GFR 60-89 ml/min 02/03/2023    Class 1 obesity due to excess calories with serious comorbidity and body mass index (BMI) of 33.0 to 33.9 in adult 06/24/2021    Diabetes mellitus (HCC)     Mixed hyperlipidemia 05/05/2023       No past surgical history on file.    No family history on file.      Medications have been verified.        Objective   /58   Pulse 86   Temp (!) 97.3 °F (36.3 °C)   Resp 18   Ht 5' 6\" (1.676 m)   Wt 92.5 kg (204 lb)   SpO2 96%   BMI 32.93 kg/m²        Physical Exam     Physical Exam  Vitals and nursing note reviewed.   Constitutional:       Appearance: Normal appearance. He is normal weight.   HENT:      Head: Normocephalic and atraumatic.     Cardiovascular:      Rate and Rhythm: Normal rate and regular rhythm.      Pulses: Normal pulses.      Heart sounds: Normal heart sounds.   Pulmonary:      Effort: Pulmonary effort is normal.      Breath sounds: Normal breath sounds.     Musculoskeletal:         General: Normal range of motion.      Cervical back: Normal range of motion and neck supple.      Comments: Bilat medial lower legs   contact dermatitis/poison I've  with left more than right     left with slight tenderness  No calf pain or swelling      Skin:     General: Skin is warm.      Capillary Refill: Capillary refill takes less than 2 seconds.     Neurological:      Mental Status: He is alert.                          [1]   Current Outpatient Medications:     atorvastatin (LIPITOR) 40 mg tablet, Take 1 tablet (40 mg total) by mouth daily, Disp: 90 tablet, Rfl: 1    Blood Glucose Monitoring Suppl (OneTouch Verio) w/Device KIT, Use 2 (two) times a day, Disp: 1 kit, Rfl: 0    cephalexin (KEFLEX) 500 mg capsule, Take 1 capsule (500 mg total) by mouth every 8 (eight) hours for 10 days, Disp: 30 capsule, Rfl: 0    cetirizine (ZyrTEC) 10 mg tablet, Take 1 tablet (10 mg total) by mouth daily, Disp: 30 tablet, Rfl: 5    " desoximetasone (TOPICORT) 0.25 % cream, Apply topically 2 (two) times a day, Disp: 60 g, Rfl: 0    Diclofenac Sodium (VOLTAREN) 1 %, Apply 2 g topically 4 (four) times a day, Disp: 150 g, Rfl: 1    Empagliflozin (Jardiance) 25 MG TABS, Take 1 tablet (25 mg total) by mouth daily, Disp: 90 tablet, Rfl: 1    glucose blood (OneTouch Verio) test strip, Use as instructed, Disp: 100 strip, Rfl: 2    Lancets (onetouch ultrasoft) lancets, Use as instructed, Disp: 100 each, Rfl: 2    lidocaine (LMX) 4 % cream, Apply topically daily, Disp: 28 g, Rfl: 0    metFORMIN (GLUCOPHAGE) 1000 MG tablet, Take 1 tablet (1,000 mg total) by mouth 2 (two) times a day, Disp: 180 tablet, Rfl: 5    Multiple Vitamins-Minerals (multivitamin with minerals) tablet, TAKE ONE TABLET BY MOUTH EVERY DAY, Disp: 90 tablet, Rfl: 1    Multiple Vitamins-Minerals (Ocuvite-Lutein) CAPS, Take 1 capsule by mouth daily, Disp: 90 capsule, Rfl: 1    salicyclic acid-sulfur (SEBULEX) 2-2 % shampoo, Apply topically daily as needed for dandruff, Disp: 118 mL, Rfl: 0    tamsulosin (FLOMAX) 0.4 mg, TAKE ONE CAPSULE BY MOUTH EVERY DAY WITH DINNER, Disp: 30 capsule, Rfl: 11    triamcinolone (KENALOG) 0.1 % cream, Apply topically 2 (two) times a day (Patient not taking: Reported on 5/17/2025), Disp: 30 g, Rfl: 1

## 2025-05-21 DIAGNOSIS — E11.9 TYPE 2 DIABETES MELLITUS WITHOUT COMPLICATION, WITHOUT LONG-TERM CURRENT USE OF INSULIN (HCC): ICD-10-CM

## 2025-05-21 RX ORDER — MULTIVIT-MIN/IRON FUM/FOLIC AC 7.5 MG-4
1 TABLET ORAL DAILY
Qty: 90 TABLET | Refills: 1 | Status: SHIPPED | OUTPATIENT
Start: 2025-05-21

## 2025-05-21 NOTE — DENTAL PROCEDURE DETAILS
Denture Delivery    Sena Adhikari 64 y.o. male presents with Son  for upper and lower reline denture delivery.  PMH reviewed, no changes, ASA 2 - Patient with mild systemic disease with no functional limitations.    Procedure details:  Dentures tried intraorally  Confirmed adequate retention, phonetics, occlusion, and aesthetics with patient mirror.  Identified areas of impingement by seating denture with PIP.  Adjusted denture base with acrylic bur until patient comfortable.  Occlusion verified and adjusted.  Demonstrated appropriate use of denture adhesive paste for if needed.  Provided denture home care instructions and storage case.     Patient dismissed ambulatory and alert.    NV: follow up in a few days

## 2025-05-21 NOTE — PROGRESS NOTES
Procedure Details  [id number] - REMAKE  Denture Delivery    Sena Adhikari 64 y.o. male presents with Son  for upper and lower reline denture delivery.  PMH reviewed, no changes, ASA 2 - Patient with mild systemic disease with no functional limitations.    Procedure details:  Dentures tried intraorally  Confirmed adequate retention, phonetics, occlusion, and aesthetics with patient mirror.  Identified areas of impingement by seating denture with PIP.  Adjusted denture base with acrylic bur until patient comfortable.  Occlusion verified and adjusted.  Demonstrated appropriate use of denture adhesive paste for if needed.  Provided denture home care instructions and storage case.     Patient dismissed ambulatory and alert.    NV: follow up in a few days

## 2025-06-04 ENCOUNTER — OFFICE VISIT (OUTPATIENT)
Dept: FAMILY MEDICINE CLINIC | Facility: CLINIC | Age: 65
End: 2025-06-04

## 2025-06-04 VITALS
WEIGHT: 199.7 LBS | RESPIRATION RATE: 18 BRPM | HEART RATE: 95 BPM | OXYGEN SATURATION: 98 % | HEIGHT: 66 IN | TEMPERATURE: 97.9 F | SYSTOLIC BLOOD PRESSURE: 138 MMHG | DIASTOLIC BLOOD PRESSURE: 74 MMHG | BODY MASS INDEX: 32.09 KG/M2

## 2025-06-04 DIAGNOSIS — E11.9 TYPE 2 DIABETES MELLITUS WITHOUT COMPLICATION, WITHOUT LONG-TERM CURRENT USE OF INSULIN (HCC): Primary | ICD-10-CM

## 2025-06-04 DIAGNOSIS — M19.012 ARTHRITIS OF LEFT ACROMIOCLAVICULAR JOINT: ICD-10-CM

## 2025-06-04 DIAGNOSIS — J30.2 SEASONAL ALLERGIES: ICD-10-CM

## 2025-06-04 DIAGNOSIS — R80.9 TYPE 2 DIABETES MELLITUS WITH MICROALBUMINURIA, WITHOUT LONG-TERM CURRENT USE OF INSULIN (HCC): ICD-10-CM

## 2025-06-04 DIAGNOSIS — R21 RASH: ICD-10-CM

## 2025-06-04 DIAGNOSIS — E11.29 TYPE 2 DIABETES MELLITUS WITH MICROALBUMINURIA, WITHOUT LONG-TERM CURRENT USE OF INSULIN (HCC): ICD-10-CM

## 2025-06-04 DIAGNOSIS — L50.8 CHRONIC URTICARIA: ICD-10-CM

## 2025-06-04 DIAGNOSIS — E78.2 MIXED HYPERLIPIDEMIA: ICD-10-CM

## 2025-06-04 DIAGNOSIS — M25.512 CHRONIC LEFT SHOULDER PAIN: ICD-10-CM

## 2025-06-04 DIAGNOSIS — E04.1 THYROID NODULE: ICD-10-CM

## 2025-06-04 DIAGNOSIS — G89.29 CHRONIC LEFT SHOULDER PAIN: ICD-10-CM

## 2025-06-04 LAB — SL AMB POCT HEMOGLOBIN AIC: 7.7 (ref ?–6.5)

## 2025-06-04 PROCEDURE — 99214 OFFICE O/P EST MOD 30 MIN: CPT | Performed by: FAMILY MEDICINE

## 2025-06-04 PROCEDURE — 83036 HEMOGLOBIN GLYCOSYLATED A1C: CPT | Performed by: FAMILY MEDICINE

## 2025-06-04 RX ORDER — MULTIVIT-MIN/IRON FUM/FOLIC AC 7.5 MG-4
1 TABLET ORAL DAILY
Qty: 90 TABLET | Refills: 1 | Status: SHIPPED | OUTPATIENT
Start: 2025-06-04

## 2025-06-04 RX ORDER — LIDOCAINE 40 MG/G
CREAM TOPICAL DAILY
Qty: 28 G | Refills: 0 | Status: SHIPPED | OUTPATIENT
Start: 2025-06-04

## 2025-06-04 RX ORDER — TRIAMCINOLONE ACETONIDE 1 MG/G
CREAM TOPICAL 2 TIMES DAILY
Qty: 30 G | Refills: 1 | Status: SHIPPED | OUTPATIENT
Start: 2025-06-04 | End: 2025-06-04

## 2025-06-04 RX ORDER — ATORVASTATIN CALCIUM 40 MG/1
40 TABLET, FILM COATED ORAL DAILY
Qty: 90 TABLET | Refills: 1 | Status: SHIPPED | OUTPATIENT
Start: 2025-06-04

## 2025-06-04 RX ORDER — TRIAMCINOLONE ACETONIDE 1 MG/G
CREAM TOPICAL 2 TIMES DAILY
Qty: 45 G | Refills: 1 | Status: SHIPPED | OUTPATIENT
Start: 2025-06-04

## 2025-06-04 RX ORDER — CETIRIZINE HYDROCHLORIDE 10 MG/1
10 TABLET ORAL DAILY
Qty: 30 TABLET | Refills: 5 | Status: SHIPPED | OUTPATIENT
Start: 2025-06-04

## 2025-06-04 NOTE — ASSESSMENT & PLAN NOTE
Lab Results   Component Value Date/Time    CHOLESTEROL 135 08/17/2024 10:00 AM    TRIG 162 (H) 08/17/2024 10:00 AM    TRIG 217 01/14/2014 10:00 AM    HDL 43 08/17/2024 10:00 AM    HDL 33 01/14/2014 10:00 AM    LDLCALC 60 08/17/2024 10:00 AM    LDLCALC 131 01/14/2014 10:00 AM     The 10-year ASCVD risk score is: 20.1%   Continue atorvastatin 40 mg daily  Low fat diet, regular exercise    Orders:  •  atorvastatin (LIPITOR) 40 mg tablet; Take 1 tablet (40 mg total) by mouth daily  •  Comprehensive metabolic panel; Future  •  Lipid panel; Future

## 2025-06-04 NOTE — PROGRESS NOTES
Name: Sena Adhikari      : 1960      MRN: [id number]  Encounter Provider: Bianka Caldera MD  Encounter Date: 2025   Encounter department: [facility]  :  Assessment & Plan  Type 2 diabetes mellitus without complication, without long-term current use of insulin (HCC)    Lab Results   Component Value Date    HGBA1C 7.7 (A) 2025     Stable  Current medications:  Metformin 1000 mg bid, Jardiance 25 mg daily  Glucose monitoring: glucometer  Diet: eating 2 meals a day-breakfast and lunch     Plan:  Continue current medications  Advised low-carb diet, less sweets  3.  Ophthalmology: Pending DM eye exam  4.  DM foot exam completed today  Orders:  •  POCT hemoglobin A1c  •  Multiple Vitamins-Minerals (multivitamin with minerals) tablet; Take 1 tablet by mouth daily  •  Albumin / creatinine urine ratio; Future  •  Comprehensive metabolic panel; Future    Arthritis of left acromioclavicular joint    Orders:  •  lidocaine (LMX) 4 % cream; Apply topically daily  •  Diclofenac Sodium (VOLTAREN) 1 %; Apply 2 g topically 4 (four) times a day    Chronic left shoulder pain    Orders:  •  Diclofenac Sodium (VOLTAREN) 1 %; Apply 2 g topically 4 (four) times a day    Chronic urticaria    Orders:  •  cetirizine (ZyrTEC) 10 mg tablet; Take 1 tablet (10 mg total) by mouth daily    Seasonal allergies    Orders:  •  cetirizine (ZyrTEC) 10 mg tablet; Take 1 tablet (10 mg total) by mouth daily    Mixed hyperlipidemia  Lab Results   Component Value Date/Time    CHOLESTEROL 135 2024 10:00 AM    TRIG 162 (H) 2024 10:00 AM    TRIG 217 2014 10:00 AM    HDL 43 2024 10:00 AM    HDL 33 2014 10:00 AM    LDLCALC 60 2024 10:00 AM    LDLCALC 131 2014 10:00 AM     The 10-year ASCVD risk score is: 20.1%   Continue atorvastatin 40 mg daily  Low fat diet, regular exercise    Orders:  •  atorvastatin (LIPITOR) 40 mg tablet; Take 1 tablet (40 mg total) by mouth  daily  •  Comprehensive metabolic panel; Future  •  Lipid panel; Future    Rash  Resolving poison ivy dermatitis left lower extremity  Persistent itching  Start triamcinolone cream topically twice daily x 1 week  Orders:  •  triamcinolone (KENALOG) 0.1 % cream; Apply topically 2 (two) times a day    Type 2 diabetes mellitus with microalbuminuria, without long-term current use of insulin (HCC)    Orders:  •  POCT hemoglobin A1c  •  Empagliflozin (Jardiance) 25 MG TABS; Take 1 tablet (25 mg total) by mouth daily  •  Multiple Vitamins-Minerals (multivitamin with minerals) tablet; Take 1 tablet by mouth daily  •  Albumin / creatinine urine ratio; Future  •  Comprehensive metabolic panel; Future    Thyroid nodule  H/o suspicious thyroid nodule  Repeat thyroid ultrasound  Orders:  •  US thyroid; Future           History of Present Illness   ANDREA Adhikari is a 64 y.o. male  has a past medical history of Chronic left shoulder pain, CKD (chronic kidney disease) stage 2, GFR 60-89 ml/min, Class 1 obesity due to excess calories with serious comorbidity and body mass index (BMI) of 33.0 to 33.9 in adult, Diabetes mellitus (HCC), and Mixed hyperlipidemia. who presented to the office today accompanied by his son, who helps in translating.  Patient states that he is doing well and taking all medications as prescribed.  He visited the urgent care due to left lower extremity poison ivy dermatitis and cellulitis.  He completed his course of Keflex but continues to have itching.  He does not start the Topicort cream that was prescribed, as he did not obtain it from the pharmacy.    Patient also reports right heel pain, intermittent, posteriorly.  No history of injury, trauma or fall.    Review of Systems   Constitutional:  Negative for chills and fever.   HENT:  Negative for congestion, rhinorrhea and sore throat.    Respiratory:  Negative for cough and shortness of breath.    Cardiovascular:  Negative for chest pain.  "  Gastrointestinal:  Negative for diarrhea, nausea and vomiting.   Skin:  Positive for rash.   Allergic/Immunologic: Positive for environmental allergies.   Neurological:  Negative for dizziness and headaches.       Objective   /74 (BP Location: Right arm, Patient Position: Sitting, Cuff Size: Standard)   Pulse 95   Temp 97.9 °F (36.6 °C) (Temporal)   Resp 18   Ht 5' 6\" (1.676 m)   Wt 90.6 kg (199 lb 11.2 oz)   SpO2 98%   BMI 32.23 kg/m²      Physical Exam  Vitals and nursing note reviewed.   Constitutional:       General: He is not in acute distress.     Appearance: He is well-developed.   HENT:      Head: Normocephalic and atraumatic.      Right Ear: External ear normal.      Left Ear: External ear normal.      Mouth/Throat:      Mouth: Mucous membranes are moist.     Eyes:      Conjunctiva/sclera: Conjunctivae normal.       Cardiovascular:      Rate and Rhythm: Normal rate.      Heart sounds: No murmur heard.  Pulmonary:      Effort: Pulmonary effort is normal. No respiratory distress.   Abdominal:      Palpations: Abdomen is soft.      Tenderness: There is no abdominal tenderness.     Musculoskeletal:         General: No swelling.      Cervical back: Neck supple.      Right lower leg: No edema.      Left lower leg: No edema.     Skin:     General: Skin is warm and dry.      Capillary Refill: Capillary refill takes less than 2 seconds.     Neurological:      General: No focal deficit present.      Mental Status: He is alert and oriented to person, place, and time.     Psychiatric:         Mood and Affect: Mood normal.         Behavior: Behavior normal.         "

## 2025-06-04 NOTE — ASSESSMENT & PLAN NOTE
Orders:  •  lidocaine (LMX) 4 % cream; Apply topically daily  •  Diclofenac Sodium (VOLTAREN) 1 %; Apply 2 g topically 4 (four) times a day

## 2025-06-04 NOTE — ASSESSMENT & PLAN NOTE
Orders:  •  POCT hemoglobin A1c  •  Empagliflozin (Jardiance) 25 MG TABS; Take 1 tablet (25 mg total) by mouth daily  •  Multiple Vitamins-Minerals (multivitamin with minerals) tablet; Take 1 tablet by mouth daily  •  Albumin / creatinine urine ratio; Future  •  Comprehensive metabolic panel; Future

## 2025-06-04 NOTE — ASSESSMENT & PLAN NOTE
Lab Results   Component Value Date    HGBA1C 7.7 (A) 06/04/2025    HGBA1C 7.7 (A) 02/25/2025    HGBA1C 8.0 (A) 11/25/2024     Slight improvement    - Current medications:  Metformin 1000 mg bid, Jardiance 25 mg daily  Glucose monitoring: glucometer, fingersticks- 93, 136  Diet: eating 2 meals a day-breakfast and lunch, stopped eating cakes    Plan:  Continue current medications  Advised low-carb diet, less sweets  3. Ophthalmology: Referred for DM eye exam  4.  DM foot exam next visit    SmartLink not supported outside of the Encounter Diagnoses SmartSection.

## 2025-06-06 ENCOUNTER — TELEPHONE (OUTPATIENT)
Dept: FAMILY MEDICINE CLINIC | Facility: CLINIC | Age: 65
End: 2025-06-06

## 2025-06-06 NOTE — TELEPHONE ENCOUNTER
RN call to patient regarding thyroid ultrasound ordered. No answer, left voicemail. Will forward to referral team to help patient schedule ultrasound appointment.

## 2025-06-23 ENCOUNTER — OFFICE VISIT (OUTPATIENT)
Dept: DENTISTRY | Facility: CLINIC | Age: 65
End: 2025-06-23

## 2025-06-23 VITALS — TEMPERATURE: 97.8 F | SYSTOLIC BLOOD PRESSURE: 104 MMHG | HEART RATE: 81 BPM | DIASTOLIC BLOOD PRESSURE: 69 MMHG

## 2025-06-23 DIAGNOSIS — Z01.20 ENCOUNTER FOR DENTAL EXAMINATION: Primary | ICD-10-CM

## 2025-06-23 PROCEDURE — WIS5009 POST-OP DENTURE ADJUSTMENT

## 2025-06-23 PROCEDURE — D5899 UNSPECIFIED REMOVABLE PROSTHODONTIC PROCEDURE, BY REPORT: HCPCS

## 2025-06-23 NOTE — PROGRESS NOTES
Procedure Details  [id number] - POST-OP DENTURE ADJUSTMENT  Denture Adjustment    Sena Adhikari 64 y.o. male presents with Son for Denture Adjustment.  PMH reviewed, no changes, ASA ASA 2 - Patient with mild systemic disease with no functional limitations.  Pain level 0.    Diagnosis:  History of denture reline delivery about 6 week ago.    Subjective report:  Patient notes upper right pain, lower right and anterior pain.    Procedure details:  Evaluated edentulous areas for redness or sore spots.  Used PIP paste to identify pressure points and adjusted with acrylic bur.     Informed patient that discomfort may persist while the soft tissue in the adjusted areas heal.  Recommended full-time denture wear for 2 weeks and call and schedule for another adjustment if discomfort persists.  Reviewed expectations regarding removable prosthesis and made patient aware of implant retained options.    Patient dismissed ambulatory and alert.    NV: Sandy, February, edentulous exam.

## 2025-06-23 NOTE — DENTAL PROCEDURE DETAILS
Denture Adjustment    Sena Adhikari 64 y.o. male presents with Son for Denture Adjustment.  PMH reviewed, no changes, ASA ASA 2 - Patient with mild systemic disease with no functional limitations.  Pain level 0.    Diagnosis:  History of denture reline delivery about 6 week ago.    Subjective report:  Patient notes upper right pain, lower right and anterior pain.    Procedure details:  Evaluated edentulous areas for redness or sore spots.  Used PIP paste to identify pressure points and adjusted with acrylic bur.     Informed patient that discomfort may persist while the soft tissue in the adjusted areas heal.  Recommended full-time denture wear for 2 weeks and call and schedule for another adjustment if discomfort persists.  Reviewed expectations regarding removable prosthesis and made patient aware of implant retained options.    Patient dismissed ambulatory and alert.    NV: Sandy, February, edentulous exam.